# Patient Record
Sex: MALE
[De-identification: names, ages, dates, MRNs, and addresses within clinical notes are randomized per-mention and may not be internally consistent; named-entity substitution may affect disease eponyms.]

---

## 2020-07-16 ENCOUNTER — TELEPHONE (OUTPATIENT)
Dept: SCHEDULING | Age: 60
End: 2020-07-16

## 2020-07-20 ENCOUNTER — OFFICE VISIT (OUTPATIENT)
Dept: INTERNAL MEDICINE | Age: 60
End: 2020-07-20

## 2020-07-20 VITALS
DIASTOLIC BLOOD PRESSURE: 82 MMHG | OXYGEN SATURATION: 97 % | HEART RATE: 100 BPM | WEIGHT: 150 LBS | RESPIRATION RATE: 16 BRPM | SYSTOLIC BLOOD PRESSURE: 140 MMHG | TEMPERATURE: 98.7 F | HEIGHT: 66 IN | BODY MASS INDEX: 24.11 KG/M2

## 2020-07-20 DIAGNOSIS — Z79.4 TYPE 2 DIABETES MELLITUS WITH HYPERGLYCEMIA, WITH LONG-TERM CURRENT USE OF INSULIN (CMD): ICD-10-CM

## 2020-07-20 DIAGNOSIS — E11.65 TYPE 2 DIABETES MELLITUS WITH HYPERGLYCEMIA, WITH LONG-TERM CURRENT USE OF INSULIN (CMD): ICD-10-CM

## 2020-07-20 DIAGNOSIS — E78.5 HYPERLIPIDEMIA WITH TARGET LOW DENSITY LIPOPROTEIN (LDL) CHOLESTEROL LESS THAN 70 MG/DL: ICD-10-CM

## 2020-07-20 DIAGNOSIS — K21.9 GASTROESOPHAGEAL REFLUX DISEASE WITHOUT ESOPHAGITIS: ICD-10-CM

## 2020-07-20 DIAGNOSIS — R10.12 ABDOMINAL PAIN, CHRONIC, LEFT UPPER QUADRANT: Primary | ICD-10-CM

## 2020-07-20 DIAGNOSIS — I25.810 CORONARY ARTERY DISEASE INVOLVING AUTOLOGOUS VEIN CORONARY BYPASS GRAFT WITHOUT ANGINA PECTORIS: ICD-10-CM

## 2020-07-20 DIAGNOSIS — G89.29 ABDOMINAL PAIN, CHRONIC, LEFT UPPER QUADRANT: Primary | ICD-10-CM

## 2020-07-20 DIAGNOSIS — I10 ESSENTIAL HYPERTENSION: ICD-10-CM

## 2020-07-20 PROBLEM — I25.10 CORONARY ARTERY DISEASE INVOLVING NATIVE CORONARY ARTERY OF NATIVE HEART WITHOUT ANGINA PECTORIS: Status: ACTIVE | Noted: 2018-02-14

## 2020-07-20 PROBLEM — E78.00 HIGH BLOOD CHOLESTEROL: Status: RESOLVED | Noted: 2020-07-20 | Resolved: 2020-07-20

## 2020-07-20 PROBLEM — I25.10 CORONARY ARTERY DISEASE INVOLVING NATIVE CORONARY ARTERY OF NATIVE HEART WITHOUT ANGINA PECTORIS: Status: RESOLVED | Noted: 2018-02-14 | Resolved: 2020-07-20

## 2020-07-20 PROBLEM — E78.00 HIGH BLOOD CHOLESTEROL: Status: ACTIVE | Noted: 2020-07-20

## 2020-07-20 PROCEDURE — 99204 OFFICE O/P NEW MOD 45 MIN: CPT | Performed by: INTERNAL MEDICINE

## 2020-07-20 PROCEDURE — 3077F SYST BP >= 140 MM HG: CPT | Performed by: INTERNAL MEDICINE

## 2020-07-20 PROCEDURE — 3079F DIAST BP 80-89 MM HG: CPT | Performed by: INTERNAL MEDICINE

## 2020-07-20 RX ORDER — OMEPRAZOLE 20 MG/1
20 CAPSULE, DELAYED RELEASE ORAL
Qty: 30 CAPSULE | Refills: 0 | Status: SHIPPED | OUTPATIENT
Start: 2020-07-20

## 2020-07-20 RX ORDER — ATORVASTATIN CALCIUM 40 MG/1
TABLET, FILM COATED ORAL
COMMUNITY
Start: 2011-03-10

## 2020-07-20 RX ORDER — CLOPIDOGREL BISULFATE 75 MG/1
TABLET ORAL
COMMUNITY
Start: 2011-03-10

## 2020-07-20 RX ORDER — LANCETS
EACH MISCELLANEOUS
COMMUNITY
Start: 2016-09-08

## 2020-07-20 RX ORDER — EZETIMIBE 10 MG/1
TABLET ORAL
COMMUNITY
Start: 2011-03-21

## 2020-07-20 RX ORDER — INSULIN ASPART 100 [IU]/ML
15 INJECTION, SOLUTION INTRAVENOUS; SUBCUTANEOUS
COMMUNITY
Start: 2010-05-17

## 2020-07-20 RX ORDER — NITROGLYCERIN 0.4 MG/1
TABLET SUBLINGUAL
COMMUNITY
Start: 2010-01-28

## 2020-07-20 RX ORDER — MULTIVIT-MIN/IRON/FOLIC ACID/K 18-600-40
1000 CAPSULE ORAL DAILY
COMMUNITY

## 2020-07-20 RX ORDER — CARVEDILOL 12.5 MG/1
TABLET ORAL
COMMUNITY
Start: 2011-08-12

## 2020-07-20 RX ORDER — LOSARTAN POTASSIUM 100 MG/1
100 TABLET ORAL DAILY
COMMUNITY
Start: 2017-09-18

## 2020-07-20 RX ORDER — ASPIRIN 325 MG
TABLET ORAL
COMMUNITY
Start: 2011-01-07

## 2020-07-20 SDOH — HEALTH STABILITY: MENTAL HEALTH
STRESS IS WHEN SOMEONE FEELS TENSE, NERVOUS, ANXIOUS, OR CAN'T SLEEP AT NIGHT BECAUSE THEIR MIND IS TROUBLED. HOW STRESSED ARE YOU?: ONLY A LITTLE

## 2020-07-20 SDOH — HEALTH STABILITY: PHYSICAL HEALTH: ON AVERAGE, HOW MANY DAYS PER WEEK DO YOU ENGAGE IN MODERATE TO STRENUOUS EXERCISE (LIKE A BRISK WALK)?: 0 DAYS

## 2020-07-20 SDOH — HEALTH STABILITY: PHYSICAL HEALTH: ON AVERAGE, HOW MANY MINUTES DO YOU ENGAGE IN EXERCISE AT THIS LEVEL?: 0 MIN

## 2020-07-20 ASSESSMENT — PATIENT HEALTH QUESTIONNAIRE - PHQ9
CLINICAL INTERPRETATION OF PHQ9 SCORE: NO FURTHER SCREENING NEEDED
CLINICAL INTERPRETATION OF PHQ2 SCORE: NO FURTHER SCREENING NEEDED
2. FEELING DOWN, DEPRESSED OR HOPELESS: NOT AT ALL
1. LITTLE INTEREST OR PLEASURE IN DOING THINGS: NOT AT ALL
SUM OF ALL RESPONSES TO PHQ9 QUESTIONS 1 AND 2: 0
SUM OF ALL RESPONSES TO PHQ9 QUESTIONS 1 AND 2: 0

## 2020-07-22 ENCOUNTER — IMAGING SERVICES (OUTPATIENT)
Dept: ULTRASOUND IMAGING | Age: 60
End: 2020-07-22
Attending: INTERNAL MEDICINE

## 2020-07-22 DIAGNOSIS — G89.29 ABDOMINAL PAIN, CHRONIC, LEFT UPPER QUADRANT: ICD-10-CM

## 2020-07-22 DIAGNOSIS — R10.12 ABDOMINAL PAIN, CHRONIC, LEFT UPPER QUADRANT: ICD-10-CM

## 2020-07-22 PROCEDURE — 76705 ECHO EXAM OF ABDOMEN: CPT | Performed by: RADIOLOGY

## 2021-09-13 ENCOUNTER — TELEPHONE (OUTPATIENT)
Dept: FAMILY MEDICINE CLINIC | Facility: CLINIC | Age: 61
End: 2021-09-13

## 2021-09-13 ENCOUNTER — OFFICE VISIT (OUTPATIENT)
Dept: FAMILY MEDICINE CLINIC | Facility: CLINIC | Age: 61
End: 2021-09-13
Payer: COMMERCIAL

## 2021-09-13 VITALS
SYSTOLIC BLOOD PRESSURE: 122 MMHG | HEART RATE: 98 BPM | WEIGHT: 147 LBS | OXYGEN SATURATION: 99 % | BODY MASS INDEX: 24.2 KG/M2 | HEIGHT: 65.55 IN | DIASTOLIC BLOOD PRESSURE: 80 MMHG | TEMPERATURE: 98 F

## 2021-09-13 DIAGNOSIS — E11.65 DM TYPE 2, UNCONTROLLED, WITH RENAL COMPLICATIONS (HCC): ICD-10-CM

## 2021-09-13 DIAGNOSIS — I50.22 CHRONIC SYSTOLIC CONGESTIVE HEART FAILURE (HCC): ICD-10-CM

## 2021-09-13 DIAGNOSIS — E11.29 DM TYPE 2, UNCONTROLLED, WITH RENAL COMPLICATIONS (HCC): ICD-10-CM

## 2021-09-13 DIAGNOSIS — E78.5 HYPERLIPIDEMIA WITH TARGET LOW DENSITY LIPOPROTEIN (LDL) CHOLESTEROL LESS THAN 70 MG/DL: ICD-10-CM

## 2021-09-13 DIAGNOSIS — Z95.5 S/P CORONARY ARTERY STENT PLACEMENT: ICD-10-CM

## 2021-09-13 DIAGNOSIS — Z95.1 S/P CABG X 5: ICD-10-CM

## 2021-09-13 DIAGNOSIS — Z00.00 ROUTINE GENERAL MEDICAL EXAMINATION AT A HEALTH CARE FACILITY: Primary | ICD-10-CM

## 2021-09-13 DIAGNOSIS — I25.810 CORONARY ARTERY DISEASE INVOLVING AUTOLOGOUS VEIN CORONARY BYPASS GRAFT WITHOUT ANGINA PECTORIS: ICD-10-CM

## 2021-09-13 DIAGNOSIS — E11.319 DIABETIC RETINOPATHY OF BOTH EYES ASSOCIATED WITH TYPE 2 DIABETES MELLITUS, MACULAR EDEMA PRESENCE UNSPECIFIED, UNSPECIFIED RETINOPATHY SEVERITY (HCC): ICD-10-CM

## 2021-09-13 DIAGNOSIS — I12.9 HYPERTENSION, RENAL DISEASE: ICD-10-CM

## 2021-09-13 LAB
CARTRIDGE LOT#: 815 NUMERIC
HEMOGLOBIN A1C: 12.7 % (ref 4.3–5.6)

## 2021-09-13 PROCEDURE — 83036 HEMOGLOBIN GLYCOSYLATED A1C: CPT | Performed by: FAMILY MEDICINE

## 2021-09-13 PROCEDURE — 99203 OFFICE O/P NEW LOW 30 MIN: CPT | Performed by: FAMILY MEDICINE

## 2021-09-13 PROCEDURE — 99386 PREV VISIT NEW AGE 40-64: CPT | Performed by: FAMILY MEDICINE

## 2021-09-13 PROCEDURE — 3046F HEMOGLOBIN A1C LEVEL >9.0%: CPT | Performed by: FAMILY MEDICINE

## 2021-09-13 PROCEDURE — 3008F BODY MASS INDEX DOCD: CPT | Performed by: FAMILY MEDICINE

## 2021-09-13 PROCEDURE — 3074F SYST BP LT 130 MM HG: CPT | Performed by: FAMILY MEDICINE

## 2021-09-13 PROCEDURE — 3079F DIAST BP 80-89 MM HG: CPT | Performed by: FAMILY MEDICINE

## 2021-09-13 RX ORDER — INSULIN ASPART 100 [IU]/ML
INJECTION, SOLUTION INTRAVENOUS; SUBCUTANEOUS
Qty: 60 ML | Refills: 1 | Status: SHIPPED | OUTPATIENT
Start: 2021-09-13

## 2021-09-13 RX ORDER — CARVEDILOL 12.5 MG/1
12.5 TABLET ORAL DAILY
COMMUNITY
End: 2021-09-13

## 2021-09-13 RX ORDER — CLOPIDOGREL BISULFATE 75 MG/1
75 TABLET ORAL DAILY
Qty: 90 TABLET | Refills: 1 | Status: SHIPPED | OUTPATIENT
Start: 2021-09-13

## 2021-09-13 RX ORDER — LOSARTAN POTASSIUM 100 MG/1
100 TABLET ORAL DAILY
Qty: 90 TABLET | Refills: 1 | Status: SHIPPED | OUTPATIENT
Start: 2021-09-13

## 2021-09-13 RX ORDER — EZETIMIBE 10 MG/1
10 TABLET ORAL DAILY
Qty: 90 TABLET | Refills: 1 | Status: SHIPPED | OUTPATIENT
Start: 2021-09-13

## 2021-09-13 RX ORDER — CARVEDILOL 12.5 MG/1
12.5 TABLET ORAL DAILY
Qty: 180 TABLET | Refills: 1 | Status: SHIPPED | OUTPATIENT
Start: 2021-09-13

## 2021-09-13 RX ORDER — INSULIN DETEMIR 100 [IU]/ML
30 INJECTION, SOLUTION SUBCUTANEOUS NIGHTLY
Qty: 27 ML | Refills: 1 | Status: SHIPPED | OUTPATIENT
Start: 2021-09-13 | End: 2021-12-12

## 2021-09-13 RX ORDER — ATORVASTATIN CALCIUM 40 MG/1
40 TABLET, FILM COATED ORAL DAILY
Qty: 90 TABLET | Refills: 1 | Status: SHIPPED | OUTPATIENT
Start: 2021-09-13

## 2021-09-13 RX ORDER — INSULIN ASPART 100 [IU]/ML
INJECTION, SOLUTION INTRAVENOUS; SUBCUTANEOUS
COMMUNITY
End: 2021-09-13

## 2021-09-14 NOTE — PATIENT INSTRUCTIONS
Call to schedule with cardiologist, eye doctor, and diabetic clinic    Continue all meds as prescribed    Go to [facility] for fasting bloodwork and urine tests    Followup in 2 months, sooner if needed

## 2021-09-14 NOTE — TELEPHONE ENCOUNTER
Patient signed HIPAA medical records authorization form for the Facility identified below to disclose patient health information to [address] [place] [address] [address], [facility], [address]  Phone: [phone]  Fax# [phone]

## 2021-09-25 PROCEDURE — 3060F POS MICROALBUMINURIA REV: CPT | Performed by: FAMILY MEDICINE

## 2021-09-27 LAB
ABSOLUTE BASOPHILS: 28 CELLS/UL (ref 0–200)
ABSOLUTE EOSINOPHILS: 99 CELLS/UL (ref 15–500)
ABSOLUTE LYMPHOCYTES: 2415 CELLS/UL (ref 850–3900)
ABSOLUTE MONOCYTES: 616 CELLS/UL (ref 200–950)
ABSOLUTE NEUTROPHILS: 2343 CELLS/UL (ref 1500–7800)
ALBUMIN/GLOBULIN RATIO: 1.3 (CALC) (ref 1–2.5)
ALBUMIN: 3.5 G/DL (ref 3.6–5.1)
ALKALINE PHOSPHATASE: 96 U/L (ref 35–144)
ALT: 28 U/L (ref 9–46)
AST: 20 U/L (ref 10–35)
BASOPHILS: 0.5 %
BILIRUBIN, TOTAL: 0.6 MG/DL (ref 0.2–1.2)
BUN: 19 MG/DL (ref 7–25)
CALCIUM: 9.2 MG/DL (ref 8.6–10.3)
CARBON DIOXIDE: 28 MMOL/L (ref 20–32)
CHLORIDE: 105 MMOL/L (ref 98–110)
CHOL/HDLC RATIO: 2.5 (CALC)
CHOLESTEROL, TOTAL: 123 MG/DL
CREATININE, RANDOM URINE: 120 MG/DL (ref 20–320)
CREATININE: 1.27 MG/DL (ref 0.7–1.33)
EGFR IF AFRICN AM: 71 ML/MIN/1.73M2
EGFR IF NONAFRICN AM: 61 ML/MIN/1.73M2
EOSINOPHILS: 1.8 %
GLOBULIN: 2.6 G/DL (CALC) (ref 1.9–3.7)
GLUCOSE: 139 MG/DL (ref 65–99)
HDL CHOLESTEROL: 49 MG/DL
HEMATOCRIT: 40 % (ref 38.5–50)
HEMOGLOBIN: 13.4 G/DL (ref 13.2–17.1)
LDL-CHOLESTEROL: 59 MG/DL (CALC)
LYMPHOCYTES: 43.9 %
MCH: 27.8 PG (ref 27–33)
MCHC: 33.5 G/DL (ref 32–36)
MCV: 83 FL (ref 80–100)
MICROALBUMIN/CREATININE RATIO, RANDOM URINE: 1567 MCG/MG CREAT
MICROALBUMIN: 188 MG/DL
MONOCYTES: 11.2 %
MPV: 11.2 FL (ref 7.5–12.5)
NEUTROPHILS: 42.6 %
NON-HDL CHOLESTEROL: 74 MG/DL (CALC)
PLATELET COUNT: 265 THOUSAND/UL (ref 140–400)
POTASSIUM: 4.9 MMOL/L (ref 3.5–5.3)
PROTEIN, TOTAL: 6.1 G/DL (ref 6.1–8.1)
RDW: 13.8 % (ref 11–15)
RED BLOOD CELL COUNT: 4.82 MILLION/UL (ref 4.2–5.8)
SODIUM: 141 MMOL/L (ref 135–146)
T4, FREE: 1 NG/DL (ref 0.8–1.8)
TOTAL PSA: 1.4 NG/ML
TRIGLYCERIDES: 70 MG/DL
TSH: 2.17 MIU/L (ref 0.4–4.5)
WHITE BLOOD CELL COUNT: 5.5 THOUSAND/UL (ref 3.8–10.8)

## 2021-09-28 ENCOUNTER — TELEPHONE (OUTPATIENT)
Dept: FAMILY MEDICINE CLINIC | Facility: CLINIC | Age: 61
End: 2021-09-28

## 2021-09-28 NOTE — PROGRESS NOTES
Colten BaldwinThrockmorton is a 63year old male who is here for Patient presents with:  Wellness Visit  Medication Request      HPI:     1. Routine general medical examination at a health care facility  -due for wellness    2.  Coronary artery disease involving au Thyroid Disorder Daughter         Thyroid history - unable to elaborate   • Other (Other) Daughter         See thyroid tab   • Diabetes Brother         Type 2 DM on oral agents   • Eye Problems Neg        Social History    Socioeconomic History      Lindsay SYSTEMS:     See HPI for relevant ROS  GENERAL HEALTH: no other complaints  NEURO: no other complaints  VISION: no other complaints  RESPIRATORY: no other complaints  CARDIOVASCULAR: no other complaints  GI: no other complaints  : no other complaints  SK disease) In 2006 and 2010    Stents x 2 - once in 2006 after MI and prior to CABG and again in 2010.    • CAD (coronary artery disease), autologous vein bypass graft 5/13/2014   • DM type 2, uncontrolled, with renal complications ([facility] [address] 2/23/0509   • Hyperli reasons:     The patient has a prior MI or stroke diagnosis    ASSESSMENT AND PLAN:     Health Maintenance  -We discussed the following:  Healthy diet and exercise, immunizations, and cancer screening    -Fasting labs ordered    Stress Management: counseled & 12-14 units w/dinner + SSI (max 100 units/day)   • insulin detemir (LEVEMIR FLEXTOUCH) 100 UNIT/ML Subcutaneous Solution Pen-injector 27 mL 1     Sig: Inject 30 Units into the skin nightly.    • atorvastatin 40 MG Oral Tab 90 tablet 1     Sig: Take 1 tabl

## 2021-09-28 NOTE — TELEPHONE ENCOUNTER
Patient is due for Colonoscopy. Called patient- I was explaining to patient that we received records from the A and we did not receive a colonoscopy report. I went on to explain to patient that he is due for a Colonoscopy.  Patient very rudely interru

## 2021-09-30 ENCOUNTER — TELEPHONE (OUTPATIENT)
Dept: FAMILY MEDICINE CLINIC | Facility: CLINIC | Age: 61
End: 2021-09-30

## 2021-09-30 DIAGNOSIS — E11.65 DM TYPE 2, UNCONTROLLED, WITH RENAL COMPLICATIONS (HCC): Primary | ICD-10-CM

## 2021-09-30 DIAGNOSIS — E11.29 DM TYPE 2, UNCONTROLLED, WITH RENAL COMPLICATIONS (HCC): Primary | ICD-10-CM

## 2021-09-30 NOTE — TELEPHONE ENCOUNTER
I see a referral placed for diabetes education. Unsure if you want the clinic with [facility]? I pended this referral.  Please advise.

## 2021-09-30 NOTE — TELEPHONE ENCOUNTER
Elisabeth Daughter is calling regarding Flor Robertson, he is seeing specialist that Dr Varsha Roberts referred to him, he is a diabetic but there was no Endo Dr referred, and Flor Robertson would like to have a diabetic reader the one that is on his arm so he does not have to stick

## 2021-10-04 NOTE — PROGRESS NOTES
Marquise Pritchett is a 63year old male who presents today to establish for IDDM diabetes management. Primary care physician: Joy George MD  In the past 3m DM control has been poorly controlled.   Today's A1C 10.7% ( last A1C 12.7% )     Due to wife c 09/25/2021    TRIG 70 09/25/2021    HDL 49 09/25/2021    LDL 59 09/25/2021    MICROALBCREA 1,002.7 (H) 08/31/2016    CREATSERUM 1.27 09/25/2021    GFRNAA 61 09/25/2021    GFRAA 71 09/25/2021    AST 20 09/25/2021    ALT 28 09/25/2021       Component      La Diabetes Mother         Type 2 DM on oral agents   • Thyroid Disorder Mother         Thyroid history - not able to elaborate   • Other (Other) Mother         See thyroid tab   • Heart Disorder Maternal Grandmother         CAD   • Diabetes Paternal Grandmot polyphagia, polydipsia: no  Neurological: Paresthesias: yes, LE   HEENT: Blurred vision: no  Skin: no rash or wounds  Hematological: Hypoglycemia: yes       Review of Systems     LUNGS: denies shortness of breath   CARDIOVASCULAR: denies chest pain  GI: de look into coverage kelli FRANCIS       Reviewed w patient pathophysiology of diabetes, clinical significance of A1c, adverse effects of suboptimal glucose control, and goals of therapy   Reviewed the A1C test, what the value reflects and the goal for the eye exam: No data recorded Exam shows retinopathy?  No data recorded  Last diabetic foot exam: No data recorded- defer to next appt   Date of last PHQ-2 depression screen: PHQ-2 - Date of last depression screening: 10/5/2021  Dentist : recommended every 6m

## 2021-10-05 ENCOUNTER — OFFICE VISIT (OUTPATIENT)
Dept: ENDOCRINOLOGY CLINIC | Facility: CLINIC | Age: 61
End: 2021-10-05
Payer: COMMERCIAL

## 2021-10-05 ENCOUNTER — TELEPHONE (OUTPATIENT)
Dept: ENDOCRINOLOGY CLINIC | Facility: CLINIC | Age: 61
End: 2021-10-05

## 2021-10-05 VITALS
HEART RATE: 90 BPM | OXYGEN SATURATION: 98 % | WEIGHT: 150.81 LBS | SYSTOLIC BLOOD PRESSURE: 142 MMHG | BODY MASS INDEX: 25 KG/M2 | DIASTOLIC BLOOD PRESSURE: 86 MMHG

## 2021-10-05 DIAGNOSIS — E10.65 TYPE 1 DIABETES MELLITUS WITH HYPERGLYCEMIA (HCC): Primary | ICD-10-CM

## 2021-10-05 DIAGNOSIS — I10 PRIMARY HYPERTENSION: ICD-10-CM

## 2021-10-05 DIAGNOSIS — E78.5 HYPERLIPIDEMIA WITH TARGET LOW DENSITY LIPOPROTEIN (LDL) CHOLESTEROL LESS THAN 70 MG/DL: ICD-10-CM

## 2021-10-05 PROCEDURE — 3079F DIAST BP 80-89 MM HG: CPT | Performed by: NURSE PRACTITIONER

## 2021-10-05 PROCEDURE — 3046F HEMOGLOBIN A1C LEVEL >9.0%: CPT | Performed by: FAMILY MEDICINE

## 2021-10-05 PROCEDURE — 99215 OFFICE O/P EST HI 40 MIN: CPT | Performed by: NURSE PRACTITIONER

## 2021-10-05 PROCEDURE — 82947 ASSAY GLUCOSE BLOOD QUANT: CPT | Performed by: NURSE PRACTITIONER

## 2021-10-05 PROCEDURE — 3077F SYST BP >= 140 MM HG: CPT | Performed by: NURSE PRACTITIONER

## 2021-10-05 PROCEDURE — 83036 HEMOGLOBIN GLYCOSYLATED A1C: CPT | Performed by: NURSE PRACTITIONER

## 2021-10-05 RX ORDER — IBUPROFEN 600 MG/1
TABLET ORAL
Qty: 1 KIT | Refills: 0 | Status: SHIPPED | OUTPATIENT
Start: 2021-10-05 | End: 2021-10-05

## 2021-10-05 RX ORDER — IBUPROFEN 600 MG/1
TABLET ORAL
Qty: 1 KIT | Refills: 0 | Status: SHIPPED | OUTPATIENT
Start: 2021-10-05

## 2021-10-05 NOTE — PROGRESS NOTES
A continuous glucose sensor for 24 hour blood sugar monitoring was placed on patient today per APN order.    Serial NUMBER: [id number]  Exp date: (5/31/21)  - After cleansing skin with alcohol prep, Sensor (libre2)was inserted on  Right Posterior upper arm 96

## 2021-10-05 NOTE — ASSESSMENT & PLAN NOTE
Per ADA guidelines, in patients with type 2 diabetes and established ASCVD, incorporating agent proven to reduce major adverse CV events and CV mortality (which currently is  GLP agents: Victoza, Ozempic , Trulicity  and SGLT2, jardiance and [place])   How

## 2021-10-05 NOTE — ASSESSMENT & PLAN NOTE
A1C 10.7% (last A1C 12.7%)   Weight: 150 lb   Had lengthy discussion regarding poor glycemic control. Patient was reminded the negative impact these persistent high glucose trends have on his health.    Discussed importance of better glucose control to prev

## 2021-10-05 NOTE — TELEPHONE ENCOUNTER
Filled pt form.  Faxed pt form, progress notes and face sheet to. [phone]    CAB signed and faxed out

## 2021-10-05 NOTE — PATIENT INSTRUCTIONS
Mcmillan A1C: 10.7% (last A1C 12.7%)     Continuaremos trabajando juntos para mantener rosario niveles de azúcar en sakshi en un objetivo saludable. El [place] principal del tratamiento de la diabetes es evitar que mcmillan nivel de azúcar suba demasiado.  Medimos mcmillan so we can track your glucose trends  We can see if your insurance will cover this as well.  It may take a few weeks - we have to use [employer] ( a medical supplier)     The personal Cynthia continous glucose sensor will be ready after 60 minutes  Please scan A intersticial (sensor) y la sakshi capilar (punción digital) danny momentos de cambios rápidos en el azúcar en sakshi, macey después de comer, administrarse insulina o hacer ejercicio.  Puede oren prieto demora de aproximadamente 5 minutos para que el sensor alcohol. Actualmente, el Feliz 2 está siendo aprobado para el uso del sensor con prieto aplicación de teléfono inteligente, por lo que, por ahora, debe usar el dispositivo lector para rastrear rosario tendencias de azúcar en sakshi.  El día feliz 14 se integr alternate method to  measure your glucose levels and inform your treatment decisions. Remember to always have your next Sensor available before your current one ends so you can  keep getting your glucose readings.     If the sensor falls off before  th dosificación o problemas con las tendencias de azúcar en [address]. Prueba de azúcar en sakshi:  Recuerde llevar mcmillan medidor de glucosa o registro de azúcar en sakshi a cada leon en el centro de diabetes.   Es repita el tratamiento (paso 2). 4. Si falta más de prieto hora para mcmillan próxima comida, coma un bocadillo pequeño. 5. Si no está seguro de la causa de mcmillan nivel bajo de glucosa en sakshi, llame a mcmillan proveedor de [employer].   6. Siempre controle mcmillan glucos vomitar después de responder al glucagón. Aunque inicialmente el azúcar en sakshi puede subir a un nivel alto, es importante que el paciente ingiera algún alimento que contenga carbohidratos tan pronto macey pueda, ya que la glucosa inyectable desaparece. is for emergencies and should be used for such. What to expect after dosing: In about 5 to 15 minutes, the person should respond and begin yang ome out of hypoglycemia.  Sometimes a person may experience nausea or may vomit after responding to glucagon

## 2021-10-11 ENCOUNTER — NURSE ONLY (OUTPATIENT)
Dept: ENDOCRINOLOGY CLINIC | Facility: CLINIC | Age: 61
End: 2021-10-11
Payer: COMMERCIAL

## 2021-10-11 DIAGNOSIS — Z02.9 ADMINISTRATIVE ENCOUNTER: ICD-10-CM

## 2021-10-18 NOTE — TELEPHONE ENCOUNTER
Called pt LMTCB if had any question let pt know in message to give [phone] opt 8 to confirm his order.

## 2021-10-25 ENCOUNTER — TELEPHONE (OUTPATIENT)
Dept: FAMILY MEDICINE CLINIC | Facility: CLINIC | Age: 61
End: 2021-10-25

## 2021-10-25 NOTE — TELEPHONE ENCOUNTER
Pt's daughter called and said her dad is having the same symptoms he was having in July when he had pneumonia. She said she doesn't want to take him to the ER unless she really has to. She said he is having a hard time breathing.

## 2021-10-25 NOTE — TELEPHONE ENCOUNTER
Altagracia Hu is returning a call to the nurse regarding her dad, please call Altagracia Hu at [phone]

## 2021-10-25 NOTE — TELEPHONE ENCOUNTER
Spoke to Judd. States patient was admitted to the hospital in July with pneumonia. She thinks he is acting very similar. Has shortness of breath, bilat edema and a dry cough. Started 2 days ago. COVID test yesterday negative. O2 sat is 96%.   Has been

## 2021-10-25 NOTE — TELEPHONE ENCOUNTER
Dr. Elena Lima recommendation is for patient to go to ER for immediate evaluation      Spoke with Daughter Chico Guadalupe, she verbalized understanding of Dr. Rabia Lima instruction, she will take patient ER.

## 2021-11-05 NOTE — TELEPHONE ENCOUNTER
Contacted Chris Rodrigues with [facility] for status update. Pt was contacted 10/18 and instructed to call [facility].

## 2021-11-08 NOTE — TELEPHONE ENCOUNTER
Contacted daughter Kylee Dao to let her know they need to call Jhon Lucia at [phone] opt 8 to confirm order . Daughter agreed to do so today.

## 2021-11-26 ENCOUNTER — TELEPHONE (OUTPATIENT)
Dept: ENDOCRINOLOGY CLINIC | Facility: CLINIC | Age: 61
End: 2021-11-26

## 2021-11-26 NOTE — TELEPHONE ENCOUNTER
Pt daughter Zoya Alarcon called stating through [employer] they are paying 1,000 for 7 boxes of the [employer] sensors.  Zoya Alarcon states when she called the insurance to figure out how much it would be through there she was told to contacted the office for us to start a PA th

## 2021-11-26 NOTE — TELEPHONE ENCOUNTER
Was able to start Pa but pt daughter wanted to know if we were able to send Rx of feliz sensors to local pharmacy [facility] in [place] or if we could provided with samples for the meantime while pt gets approved hopefully?

## 2021-11-26 NOTE — TELEPHONE ENCOUNTER
Called becki to see how much it would coat and why. Pt has to pay 1,000 for 7 boxes due to pt not meeting their deductible.  Called pt insurance to start a PA for spoke with Priscilla Ty to get this PA was transferred to the PA department to start the authori

## 2021-11-29 RX ORDER — FLASH GLUCOSE SENSOR
KIT MISCELLANEOUS
Qty: 2 EACH | Refills: 5 | Status: SHIPPED | OUTPATIENT
Start: 2021-11-29

## 2021-11-29 RX ORDER — FLASH GLUCOSE SENSOR
1 KIT MISCELLANEOUS
Qty: 2 EACH | Refills: 5 | Status: SHIPPED | OUTPATIENT
Start: 2021-11-29 | End: 2021-11-29

## 2021-11-29 NOTE — TELEPHONE ENCOUNTER
Pt's daughter called on Friday 11/26/2021, left message requesting Rainey 2 sensors be sent to pharmacy.

## 2021-11-29 NOTE — TELEPHONE ENCOUNTER
Call with Judd who reports her dad uses the Freestyle 14 day [employer] sensors. Will send to pharmacy.

## 2021-11-30 NOTE — TELEPHONE ENCOUNTER
PA was not approved for Cynthia 14 day due to unmet criteria, let CAB know about the denial and CAB wrote a letter for a peer to peer review, sent CAB letter via fax    Fax: [phone]  Fax confirmed

## 2021-12-01 ENCOUNTER — TELEPHONE (OUTPATIENT)
Dept: ENDOCRINOLOGY CLINIC | Facility: CLINIC | Age: 61
End: 2021-12-01

## 2021-12-01 ENCOUNTER — NURSE ONLY (OUTPATIENT)
Dept: ENDOCRINOLOGY CLINIC | Facility: CLINIC | Age: 61
End: 2021-12-01
Payer: COMMERCIAL

## 2021-12-01 NOTE — TELEPHONE ENCOUNTER
Patient's daughter called in stating he uses the RENETTA [facility] 2 sensor. She told me 11/29/2021 that he was using Freestyle 14 day sensor.

## 2021-12-01 NOTE — TELEPHONE ENCOUNTER
Pt.'s wife answered phone;told her TATE Fernandez has referred her  for diabetes education. Provided her with return phone to schedule.

## 2021-12-01 NOTE — TELEPHONE ENCOUNTER
Pt daughter called stating she requested the wrong [place] she thought the pt was wearing the [place] 14 day but pt actually has the [place] 2, pt daughter already applied the [place] 14 day but the reader is not compatibly and pt is asking if we can give her a li

## 2021-12-15 ENCOUNTER — OFFICE VISIT (OUTPATIENT)
Dept: FAMILY MEDICINE CLINIC | Facility: CLINIC | Age: 61
End: 2021-12-15
Payer: COMMERCIAL

## 2021-12-15 VITALS
WEIGHT: 157 LBS | DIASTOLIC BLOOD PRESSURE: 70 MMHG | BODY MASS INDEX: 25.84 KG/M2 | HEART RATE: 92 BPM | OXYGEN SATURATION: 98 % | SYSTOLIC BLOOD PRESSURE: 132 MMHG | HEIGHT: 65.55 IN | TEMPERATURE: 97 F

## 2021-12-15 DIAGNOSIS — E11.29 DM TYPE 2, UNCONTROLLED, WITH RENAL COMPLICATIONS (HCC): ICD-10-CM

## 2021-12-15 DIAGNOSIS — Z23 NEED FOR VACCINATION: ICD-10-CM

## 2021-12-15 DIAGNOSIS — E11.319 DIABETIC RETINOPATHY OF BOTH EYES ASSOCIATED WITH TYPE 2 DIABETES MELLITUS, MACULAR EDEMA PRESENCE UNSPECIFIED, UNSPECIFIED RETINOPATHY SEVERITY (HCC): ICD-10-CM

## 2021-12-15 DIAGNOSIS — I50.22 CHRONIC SYSTOLIC CONGESTIVE HEART FAILURE (HCC): ICD-10-CM

## 2021-12-15 DIAGNOSIS — I25.810 CORONARY ARTERY DISEASE INVOLVING AUTOLOGOUS VEIN CORONARY BYPASS GRAFT WITHOUT ANGINA PECTORIS: Primary | ICD-10-CM

## 2021-12-15 DIAGNOSIS — Z95.1 S/P CABG X 5: ICD-10-CM

## 2021-12-15 DIAGNOSIS — E11.65 DM TYPE 2, UNCONTROLLED, WITH RENAL COMPLICATIONS (HCC): ICD-10-CM

## 2021-12-15 DIAGNOSIS — Z95.5 S/P CORONARY ARTERY STENT PLACEMENT: ICD-10-CM

## 2021-12-15 PROCEDURE — 3075F SYST BP GE 130 - 139MM HG: CPT | Performed by: FAMILY MEDICINE

## 2021-12-15 PROCEDURE — 90471 IMMUNIZATION ADMIN: CPT | Performed by: FAMILY MEDICINE

## 2021-12-15 PROCEDURE — 99214 OFFICE O/P EST MOD 30 MIN: CPT | Performed by: FAMILY MEDICINE

## 2021-12-15 PROCEDURE — 3078F DIAST BP <80 MM HG: CPT | Performed by: FAMILY MEDICINE

## 2021-12-15 PROCEDURE — 90686 IIV4 VACC NO PRSV 0.5 ML IM: CPT | Performed by: FAMILY MEDICINE

## 2021-12-15 PROCEDURE — 3008F BODY MASS INDEX DOCD: CPT | Performed by: FAMILY MEDICINE

## 2021-12-15 NOTE — PROGRESS NOTES
Rafaela Zuñiga is a 63year old male here for Patient presents with:  Diabetes: Sugars are ranging from   Hypertension: Follow up      HPI:       1. Coronary artery disease involving autologous vein coronary bypass graft without angina pectoris  2. Problem Relation Age of Onset   • Diabetes Mother         Type 2 DM on oral agents   • Thyroid Disorder Mother         Thyroid history - not able to elaborate   • Other (Other) Mother         See thyroid tab   • Heart Disorder Maternal Grandmother 90 tablet 1   • losartan 100 MG Oral Tab Take 1 tablet (100 mg total) by mouth daily. 90 tablet 1   • carvedilol 12.5 MG Oral Tab Take 1 tablet (12.5 mg total) by mouth daily.  180 tablet 1   • Cholecalciferol (VITAMIN D) 1000 UNITS Oral Tab Take 1 tablet b in this encounter       Imaging & Referrals:  FLULAVAL INFLUENZA VACCINE QUAD PRESERVATIVE FREE 0.5 Shawn Upton MD    I spent a total of 30 minutes, more than half of which was spent counseling/coordinating care regarding dm, cad

## 2021-12-15 NOTE — PATIENT INSTRUCTIONS
Continue all medication as prescribed    Followup with Diabetes clinic    Call to schedule appt with cardiologist and eye doctor    Followup in 3 months, sooner if needed

## 2021-12-28 NOTE — TELEPHONE ENCOUNTER
Contacted patient's daughter today after calling [facility] pharmacy solutions) for decision on appeal of denial on Rainey sensors.     They decided NOT to move forward with [employer] due to out of pocket cost.  They are purchasing directly from [facility]

## 2021-12-29 NOTE — TELEPHONE ENCOUNTER
Spent 40 minutes on hold yesterday before speaking with agent who showed no updates to this patient's chart. She assumed that meant, the appeal was never received.   Will refax to both the \"reconsideration\" and standard appeal.  Faxes confirmed to [phone]

## 2022-01-03 NOTE — TELEPHONE ENCOUNTER
Received notification from insurance ([facility]) that [facility] is not longer a member with [facility]. [facility] will not be able to process the appeal.  Thank you. \"    Will send to scan and send message to patient regarding insu

## 2022-03-01 RX ORDER — FLASH GLUCOSE SENSOR
KIT MISCELLANEOUS
Qty: 2 EACH | Refills: 5 | Status: SHIPPED | OUTPATIENT
Start: 2022-03-01 | End: 2022-03-29

## 2022-03-15 ENCOUNTER — OFFICE VISIT (OUTPATIENT)
Dept: FAMILY MEDICINE CLINIC | Facility: CLINIC | Age: 62
End: 2022-03-15
Payer: COMMERCIAL

## 2022-03-15 VITALS
BODY MASS INDEX: 24.85 KG/M2 | DIASTOLIC BLOOD PRESSURE: 70 MMHG | OXYGEN SATURATION: 98 % | WEIGHT: 151 LBS | HEART RATE: 92 BPM | SYSTOLIC BLOOD PRESSURE: 128 MMHG | HEIGHT: 65.5 IN | TEMPERATURE: 98 F

## 2022-03-15 DIAGNOSIS — IMO0002 DM TYPE 2, UNCONTROLLED, WITH RENAL COMPLICATIONS: Primary | ICD-10-CM

## 2022-03-15 DIAGNOSIS — R40.0 DAYTIME SOMNOLENCE: ICD-10-CM

## 2022-03-15 DIAGNOSIS — R06.83 SNORING: ICD-10-CM

## 2022-03-15 DIAGNOSIS — R06.81 APNEA: ICD-10-CM

## 2022-03-15 DIAGNOSIS — I12.9 HYPERTENSION, RENAL DISEASE: ICD-10-CM

## 2022-03-15 LAB
CARTRIDGE LOT#: 881 NUMERIC
HEMOGLOBIN A1C: 8.2 % (ref 4.3–5.6)

## 2022-03-15 PROCEDURE — 3052F HG A1C>EQUAL 8.0%<EQUAL 9.0%: CPT | Performed by: FAMILY MEDICINE

## 2022-03-15 PROCEDURE — 83036 HEMOGLOBIN GLYCOSYLATED A1C: CPT | Performed by: FAMILY MEDICINE

## 2022-03-15 PROCEDURE — 3074F SYST BP LT 130 MM HG: CPT | Performed by: FAMILY MEDICINE

## 2022-03-15 PROCEDURE — 99214 OFFICE O/P EST MOD 30 MIN: CPT | Performed by: FAMILY MEDICINE

## 2022-03-15 PROCEDURE — 3078F DIAST BP <80 MM HG: CPT | Performed by: FAMILY MEDICINE

## 2022-03-15 PROCEDURE — 3008F BODY MASS INDEX DOCD: CPT | Performed by: FAMILY MEDICINE

## 2022-03-15 NOTE — PROGRESS NOTES
Patient has appointment scheduled with the Ophthalmologist for diabetic eye exam next month. Patient had Podiatrist evaluation 2 weeks ago.   Podiatrist is Dr. Tom Bolton in [place]

## 2022-03-15 NOTE — PATIENT INSTRUCTIONS
Trata benadryl o unisom en la noche para ayudar mcmillan dormiendo    [place] para hacer prieto leon con sleep study    Si mcmillan machina para presion esta vieja, puede buscar por prieto neuva de [employer]    Sigue con especialista de ojos y cardiologo    Regresa en 3 meses para revisar todo

## 2022-03-30 RX ORDER — FLASH GLUCOSE SENSOR
KIT MISCELLANEOUS
Qty: 2 EACH | Refills: 2 | Status: SHIPPED | OUTPATIENT
Start: 2022-03-30 | End: 2022-04-25

## 2022-04-26 ENCOUNTER — TELEPHONE (OUTPATIENT)
Dept: ENDOCRINOLOGY CLINIC | Facility: CLINIC | Age: 62
End: 2022-04-26

## 2022-04-26 ENCOUNTER — OFFICE VISIT (OUTPATIENT)
Dept: ENDOCRINOLOGY CLINIC | Facility: CLINIC | Age: 62
End: 2022-04-26
Payer: COMMERCIAL

## 2022-04-26 VITALS
SYSTOLIC BLOOD PRESSURE: 166 MMHG | OXYGEN SATURATION: 100 % | WEIGHT: 158.81 LBS | DIASTOLIC BLOOD PRESSURE: 88 MMHG | RESPIRATION RATE: 16 BRPM | BODY MASS INDEX: 26 KG/M2

## 2022-04-26 DIAGNOSIS — Z79.4 TYPE 2 DIABETES MELLITUS WITH HYPERGLYCEMIA, WITH LONG-TERM CURRENT USE OF INSULIN (HCC): Primary | ICD-10-CM

## 2022-04-26 DIAGNOSIS — E78.5 HYPERLIPIDEMIA WITH TARGET LOW DENSITY LIPOPROTEIN (LDL) CHOLESTEROL LESS THAN 70 MG/DL: ICD-10-CM

## 2022-04-26 DIAGNOSIS — N28.9 NEPHROPATHY: ICD-10-CM

## 2022-04-26 DIAGNOSIS — E11.65 TYPE 2 DIABETES MELLITUS WITH HYPERGLYCEMIA, WITH LONG-TERM CURRENT USE OF INSULIN (HCC): Primary | ICD-10-CM

## 2022-04-26 DIAGNOSIS — I25.810 CORONARY ARTERY DISEASE INVOLVING AUTOLOGOUS VEIN CORONARY BYPASS GRAFT WITHOUT ANGINA PECTORIS: ICD-10-CM

## 2022-04-26 DIAGNOSIS — I10 PRIMARY HYPERTENSION: ICD-10-CM

## 2022-04-26 LAB
CARTRIDGE LOT#: 918 NUMERIC
HEMOGLOBIN A1C: 7.6 % (ref 4.3–5.6)

## 2022-04-26 PROCEDURE — 3077F SYST BP >= 140 MM HG: CPT | Performed by: NURSE PRACTITIONER

## 2022-04-26 PROCEDURE — 99215 OFFICE O/P EST HI 40 MIN: CPT | Performed by: NURSE PRACTITIONER

## 2022-04-26 PROCEDURE — 3051F HG A1C>EQUAL 7.0%<8.0%: CPT | Performed by: INTERNAL MEDICINE

## 2022-04-26 PROCEDURE — 3060F POS MICROALBUMINURIA REV: CPT | Performed by: INTERNAL MEDICINE

## 2022-04-26 PROCEDURE — 3079F DIAST BP 80-89 MM HG: CPT | Performed by: NURSE PRACTITIONER

## 2022-04-26 PROCEDURE — 95251 CONT GLUC MNTR ANALYSIS I&R: CPT | Performed by: NURSE PRACTITIONER

## 2022-04-26 PROCEDURE — 83036 HEMOGLOBIN GLYCOSYLATED A1C: CPT | Performed by: NURSE PRACTITIONER

## 2022-04-26 RX ORDER — FLASH GLUCOSE SENSOR
KIT MISCELLANEOUS
Qty: 2 EACH | Refills: 2 | Status: SHIPPED | OUTPATIENT
Start: 2022-04-26

## 2022-04-26 RX ORDER — FLASH GLUCOSE SENSOR
1 KIT MISCELLANEOUS
Qty: 1 EACH | Refills: 11 | Status: SHIPPED | OUTPATIENT
Start: 2022-04-26

## 2022-04-26 RX ORDER — AMLODIPINE BESYLATE 5 MG/1
5 TABLET ORAL DAILY
Qty: 90 TABLET | Refills: 1 | Status: SHIPPED | OUTPATIENT
Start: 2022-04-26

## 2022-04-26 RX ORDER — INSULIN LISPRO 100 [IU]/ML
INJECTION, SOLUTION INTRAVENOUS; SUBCUTANEOUS
Qty: 15 ML | Refills: 2 | Status: SHIPPED | OUTPATIENT
Start: 2022-04-26

## 2022-04-26 NOTE — TELEPHONE ENCOUNTER
Pt signed PT info sheet and faxing to [facility] with OV notes and face sheet [phone]     Sent to scan

## 2022-04-26 NOTE — TELEPHONE ENCOUNTER
Contacted insurance for PA on [facility] 2 sensors. We can use [employer]. com but prefer paper. Went to [address] for PA form. Completed and faxed to [phone] with today's chart note.

## 2022-04-26 NOTE — TELEPHONE ENCOUNTER
Pt states insurance told him that he needs prior auth for feliz coverage    [address]   Grp: [facility]   [phone]

## 2022-04-27 LAB
CREATININE, RANDOM URINE: 107 MG/DL (ref 20–320)
MICROALBUMIN/CREATININE RATIO, RANDOM URINE: 2624 MCG/MG CREAT
MICROALBUMIN: 280.8 MG/DL

## 2022-04-29 NOTE — TELEPHONE ENCOUNTER
Pt daughter called father did not understand what this was about thought it was a bill .  Daughters svetlana phone is [phone] Implemented All Fall Risk Interventions:  [place] to call system. Call bell, personal items and telephone within reach. Instruct patient to call for assistance. Room bathroom lighting operational. Non-slip footwear when patient is off stretcher. Physically safe environment: no spills, clutter or unnecessary equipment. Stretcher in lowest position, wheels locked, appropriate side rails in place. Provide visual cue, wrist band, yellow gown, etc. Monitor gait and stability. Monitor for mental status changes and reorient to person, place, and time. Review medications for side effects contributing to fall risk. Reinforce activity limits and safety measures with patient and family.

## 2022-05-05 ENCOUNTER — TELEPHONE (OUTPATIENT)
Dept: ENDOCRINOLOGY CLINIC | Facility: CLINIC | Age: 62
End: 2022-05-05

## 2022-05-05 NOTE — TELEPHONE ENCOUNTER
Sent order, chart notes and facesheet for Rainey 2 order to [facility].  Was orginally sent to pharmacy but needs to go to DME being Medicare Advantage Plan

## 2022-05-05 NOTE — TELEPHONE ENCOUNTER
YOUNG Roger 11 regarding denial [phone]. Patient has T1 diabetes, CVD, CKD, Retinopathy, Neuropathy and is on MDI. The response we received on 4/27/22 states the request was denied. Called to request reason for denial and was told we are not in network. Transferred 4 times and on hold for 45 minutes. Called back to request provider services for additional help with this issue. We did determine the denial was incorrect and [facility] as well as the providers are in network and they denial was incorrect. Waiting on hold for the  on this claim to help. [employer] needs to use [employer] for pharmacy benefits. [facility] is not in network for this plan. This is a Medicare [address] - all Medicare Advantage CGM need to go to DME and [facility] or [employer] are in network. Sending to [facility].  Called patient to let him know to expect the call from [facility]

## 2022-05-06 ENCOUNTER — PATIENT MESSAGE (OUTPATIENT)
Dept: ENDOCRINOLOGY CLINIC | Facility: CLINIC | Age: 62
End: 2022-05-06

## 2022-05-06 NOTE — TELEPHONE ENCOUNTER
From: Susana Salomon  To: RUSS Otero  Sent: 5/6/2022 8:38 AM CDT  Subject: Medications     Did the doctor state i could no longer take losartan due to the protein found in my urine. I apologize I forgot what the doctor recommended regarding medications.  I can havey daughter call as well

## 2022-05-06 NOTE — TELEPHONE ENCOUNTER
Left message for pt to contact office regarding mychart message and labs. Will respond to [employer] also.

## 2022-05-06 NOTE — TELEPHONE ENCOUNTER
LOV: 4/26/2022  Future Appointments   Date Time Provider Pk [place]   5/24/2022  9:30 AM Fe Rubio RN, CDE [facility] [address] [address] [facility]   6/15/2022  8:00 AM Romain Singleton MD [facility] [address]   8/9/2022 10:15 AM RUSS Cordero [facility] [address] [address]     A1C: 7.6%

## 2022-05-11 ENCOUNTER — PATIENT MESSAGE (OUTPATIENT)
Dept: ENDOCRINOLOGY CLINIC | Facility: CLINIC | Age: 62
End: 2022-05-11

## 2022-05-11 RX ORDER — FLASH GLUCOSE SENSOR
1 KIT MISCELLANEOUS
Qty: 2 EACH | Refills: 11 | Status: SHIPPED | OUTPATIENT
Start: 2022-05-11

## 2022-05-11 NOTE — TELEPHONE ENCOUNTER
From: Zack Solis  To: RUSS Oscar  Sent: 5/11/2022 12:19 PM CDT  Subject: Sensor    My sensor randomly fell off. I submitted a request for a refill.

## 2022-05-31 ENCOUNTER — OFFICE VISIT (OUTPATIENT)
Dept: NEPHROLOGY | Facility: CLINIC | Age: 62
End: 2022-05-31
Payer: COMMERCIAL

## 2022-05-31 VITALS — SYSTOLIC BLOOD PRESSURE: 128 MMHG | DIASTOLIC BLOOD PRESSURE: 60 MMHG | BODY MASS INDEX: 25 KG/M2 | WEIGHT: 152 LBS

## 2022-05-31 DIAGNOSIS — I10 PRIMARY HYPERTENSION: ICD-10-CM

## 2022-05-31 DIAGNOSIS — E11.21 DIABETIC NEPHROPATHY ASSOCIATED WITH TYPE 2 DIABETES MELLITUS (HCC): ICD-10-CM

## 2022-05-31 DIAGNOSIS — N18.30 STAGE 3 CHRONIC KIDNEY DISEASE, UNSPECIFIED WHETHER STAGE 3A OR 3B CKD (HCC): Primary | ICD-10-CM

## 2022-05-31 DIAGNOSIS — R80.9 PROTEINURIA, UNSPECIFIED TYPE: ICD-10-CM

## 2022-05-31 PROCEDURE — 3074F SYST BP LT 130 MM HG: CPT | Performed by: INTERNAL MEDICINE

## 2022-05-31 PROCEDURE — 99204 OFFICE O/P NEW MOD 45 MIN: CPT | Performed by: INTERNAL MEDICINE

## 2022-05-31 PROCEDURE — 3078F DIAST BP <80 MM HG: CPT | Performed by: INTERNAL MEDICINE

## 2022-05-31 NOTE — TELEPHONE ENCOUNTER
[facility] said they did not receive this order. Can you please resend directly to [email or web address] to get processed ASAP

## 2022-05-31 NOTE — TELEPHONE ENCOUNTER
Can't find copy of order in brown folder or media. Orders prepped, signed and emailed to [email or web address]

## 2022-06-07 RX ORDER — ATORVASTATIN CALCIUM 40 MG/1
TABLET, FILM COATED ORAL
Qty: 90 TABLET | Refills: 0 | Status: SHIPPED | OUTPATIENT
Start: 2022-06-07

## 2022-06-07 RX ORDER — EZETIMIBE 10 MG/1
TABLET ORAL
Qty: 90 TABLET | Refills: 0 | Status: SHIPPED | OUTPATIENT
Start: 2022-06-07

## 2022-06-07 RX ORDER — CLOPIDOGREL BISULFATE 75 MG/1
TABLET ORAL
Qty: 90 TABLET | Refills: 0 | Status: SHIPPED | OUTPATIENT
Start: 2022-06-07

## 2022-06-07 RX ORDER — LOSARTAN POTASSIUM 100 MG/1
TABLET ORAL
Qty: 90 TABLET | Refills: 0 | Status: SHIPPED | OUTPATIENT
Start: 2022-06-07

## 2022-06-29 ENCOUNTER — APPOINTMENT (OUTPATIENT)
Dept: GENERAL RADIOLOGY | Age: 62
End: 2022-06-29
Attending: PHYSICIAN ASSISTANT
Payer: COMMERCIAL

## 2022-06-29 ENCOUNTER — TELEPHONE (OUTPATIENT)
Dept: CASE MANAGEMENT | Age: 62
End: 2022-06-29

## 2022-06-29 ENCOUNTER — HOSPITAL ENCOUNTER (OUTPATIENT)
Age: 62
Discharge: HOME | End: 2022-06-29
Payer: COMMERCIAL

## 2022-06-29 VITALS
HEIGHT: 65 IN | BODY MASS INDEX: 26.16 KG/M2 | TEMPERATURE: 98 F | SYSTOLIC BLOOD PRESSURE: 131 MMHG | WEIGHT: 157 LBS | RESPIRATION RATE: 14 BRPM | DIASTOLIC BLOOD PRESSURE: 77 MMHG | HEART RATE: 81 BPM | OXYGEN SATURATION: 99 %

## 2022-06-29 DIAGNOSIS — U07.1 COVID-19: Primary | ICD-10-CM

## 2022-06-29 DIAGNOSIS — N18.9 ACUTE RENAL FAILURE SUPERIMPOSED ON CHRONIC KIDNEY DISEASE, UNSPECIFIED CKD STAGE, UNSPECIFIED ACUTE RENAL FAILURE TYPE (HCC): ICD-10-CM

## 2022-06-29 DIAGNOSIS — N17.9 ACUTE RENAL FAILURE SUPERIMPOSED ON CHRONIC KIDNEY DISEASE, UNSPECIFIED CKD STAGE, UNSPECIFIED ACUTE RENAL FAILURE TYPE (HCC): ICD-10-CM

## 2022-06-29 LAB
ATRIAL RATE: 82 BPM
BUN BLD-MCNC: 37 MG/DL (ref 7–18)
CHLORIDE BLD-SCNC: 106 MMOL/L (ref 98–112)
CO2 BLD-SCNC: 19 MMOL/L (ref 21–32)
CREAT BLD-MCNC: 1.6 MG/DL
GLUCOSE BLD-MCNC: 170 MG/DL (ref 70–99)
HCT VFR BLD CALC: 42 %
ISTAT IONIZED CALCIUM FOR CHEM 8: 0.91 MMOL/L (ref 1.12–1.32)
P AXIS: 53 DEGREES
P-R INTERVAL: 140 MS
POTASSIUM BLD-SCNC: 4.1 MMOL/L (ref 3.6–5.1)
Q-T INTERVAL: 376 MS
QRS DURATION: 94 MS
QTC CALCULATION (BEZET): 439 MS
R AXIS: 88 DEGREES
SARS-COV-2 RNA RESP QL NAA+PROBE: DETECTED
SODIUM BLD-SCNC: 135 MMOL/L (ref 136–145)
T AXIS: 221 DEGREES
TROPONIN I BLD-MCNC: 0.02 NG/ML
TROPONIN I BLD-MCNC: 0.02 NG/ML
VENTRICULAR RATE: 82 BPM

## 2022-06-29 PROCEDURE — 99215 OFFICE O/P EST HI 40 MIN: CPT

## 2022-06-29 PROCEDURE — 80047 BASIC METABLC PNL IONIZED CA: CPT

## 2022-06-29 PROCEDURE — 71046 X-RAY EXAM CHEST 2 VIEWS: CPT | Performed by: PHYSICIAN ASSISTANT

## 2022-06-29 PROCEDURE — 99204 OFFICE O/P NEW MOD 45 MIN: CPT

## 2022-06-29 PROCEDURE — 93005 ELECTROCARDIOGRAM TRACING: CPT

## 2022-06-29 PROCEDURE — 84484 ASSAY OF TROPONIN QUANT: CPT

## 2022-06-29 PROCEDURE — 93010 ELECTROCARDIOGRAM REPORT: CPT

## 2022-06-29 RX ORDER — BEBTELOVIMAB 87.5 MG/ML
175 INJECTION, SOLUTION INTRAVENOUS ONCE
Status: COMPLETED | OUTPATIENT
Start: 2022-06-29 | End: 2022-06-29

## 2022-06-29 NOTE — ED QUICK NOTES
Pt felt nauseated/dizzy after phlebotomy right forearm. ice packs to forehead and back of neck, vitals recheck.  Pt reclined to low fowlers

## 2022-06-29 NOTE — ED QUICK NOTES
Pt rechecked he is feeling better. Denies any nausea or dizziness. Warm blanket provided, bottled water and snacks also provided.

## 2022-06-29 NOTE — TELEPHONE ENCOUNTER
Pt received MAB infusion at [address] [place] [address] on 6/29/22 for COVID-19. Please follow-up with pt for post-infusion assessment and home monitoring if needed. Thank you.

## 2022-06-29 NOTE — ED INITIAL ASSESSMENT (HPI)
Cough - x 2 days. Wife had pneumonia 3-4 weeks ago. Sore throat- 2 days  Left armpit pain - described as burning x 5 days.  Denies any rash

## 2022-10-18 RX ORDER — DAPAGLIFLOZIN 10 MG/1
TABLET, FILM COATED ORAL
Qty: 30 TABLET | Refills: 1 | Status: SHIPPED | OUTPATIENT
Start: 2022-10-18 | End: 2022-10-26

## 2022-10-26 DIAGNOSIS — Z79.4 TYPE 2 DIABETES MELLITUS WITH HYPERGLYCEMIA, WITH LONG-TERM CURRENT USE OF INSULIN (HCC): ICD-10-CM

## 2022-10-26 DIAGNOSIS — E11.65 TYPE 2 DIABETES MELLITUS WITH HYPERGLYCEMIA, WITH LONG-TERM CURRENT USE OF INSULIN (HCC): ICD-10-CM

## 2022-10-27 RX ORDER — CLOPIDOGREL BISULFATE 75 MG/1
75 TABLET ORAL DAILY
Qty: 90 TABLET | Refills: 0 | OUTPATIENT
Start: 2022-10-27

## 2022-10-27 RX ORDER — LOSARTAN POTASSIUM 100 MG/1
100 TABLET ORAL DAILY
Qty: 90 TABLET | Refills: 0 | OUTPATIENT
Start: 2022-10-27

## 2022-10-27 RX ORDER — EZETIMIBE 10 MG/1
10 TABLET ORAL DAILY
Qty: 90 TABLET | Refills: 0 | OUTPATIENT
Start: 2022-10-27

## 2022-10-27 RX ORDER — ATORVASTATIN CALCIUM 40 MG/1
40 TABLET, FILM COATED ORAL DAILY
Qty: 90 TABLET | Refills: 0 | OUTPATIENT
Start: 2022-10-27

## 2022-10-27 RX ORDER — CARVEDILOL 12.5 MG/1
12.5 TABLET ORAL DAILY
Qty: 180 TABLET | Refills: 1 | OUTPATIENT
Start: 2022-10-27

## 2022-10-27 RX ORDER — FLASH GLUCOSE SENSOR
1 KIT MISCELLANEOUS
Qty: 2 EACH | Refills: 11 | OUTPATIENT
Start: 2022-10-27

## 2022-10-28 DIAGNOSIS — Z79.4 TYPE 2 DIABETES MELLITUS WITH HYPERGLYCEMIA, WITH LONG-TERM CURRENT USE OF INSULIN (HCC): ICD-10-CM

## 2022-10-28 DIAGNOSIS — E11.65 TYPE 2 DIABETES MELLITUS WITH HYPERGLYCEMIA, WITH LONG-TERM CURRENT USE OF INSULIN (HCC): ICD-10-CM

## 2022-10-28 RX ORDER — LOSARTAN POTASSIUM 100 MG/1
TABLET ORAL
Qty: 90 TABLET | Refills: 0 | OUTPATIENT
Start: 2022-10-28

## 2022-10-28 RX ORDER — EZETIMIBE 10 MG/1
TABLET ORAL
Qty: 90 TABLET | Refills: 0 | OUTPATIENT
Start: 2022-10-28

## 2022-10-28 RX ORDER — CLOPIDOGREL BISULFATE 75 MG/1
TABLET ORAL
Qty: 90 TABLET | Refills: 0 | OUTPATIENT
Start: 2022-10-28

## 2022-10-28 RX ORDER — CARVEDILOL 12.5 MG/1
TABLET ORAL
Qty: 90 TABLET | Refills: 0 | OUTPATIENT
Start: 2022-10-28

## 2022-10-28 RX ORDER — INSULIN GLARGINE 100 [IU]/ML
INJECTION, SOLUTION SUBCUTANEOUS
Qty: 36 ML | Refills: 0 | OUTPATIENT
Start: 2022-10-28

## 2022-10-28 RX ORDER — ATORVASTATIN CALCIUM 40 MG/1
TABLET, FILM COATED ORAL
Qty: 90 TABLET | Refills: 0 | OUTPATIENT
Start: 2022-10-28

## 2022-10-30 ENCOUNTER — PATIENT MESSAGE (OUTPATIENT)
Dept: FAMILY MEDICINE CLINIC | Facility: CLINIC | Age: 62
End: 2022-10-30

## 2022-10-30 DIAGNOSIS — Z79.4 TYPE 2 DIABETES MELLITUS WITH HYPERGLYCEMIA, WITH LONG-TERM CURRENT USE OF INSULIN (HCC): ICD-10-CM

## 2022-10-30 DIAGNOSIS — E11.65 TYPE 2 DIABETES MELLITUS WITH HYPERGLYCEMIA, WITH LONG-TERM CURRENT USE OF INSULIN (HCC): ICD-10-CM

## 2022-10-31 RX ORDER — CARVEDILOL 12.5 MG/1
12.5 TABLET ORAL DAILY
Qty: 180 TABLET | Refills: 1 | Status: SHIPPED | OUTPATIENT
Start: 2022-10-31

## 2022-10-31 RX ORDER — EZETIMIBE 10 MG/1
10 TABLET ORAL DAILY
Qty: 90 TABLET | Refills: 0 | Status: SHIPPED | OUTPATIENT
Start: 2022-10-31

## 2022-10-31 RX ORDER — LOSARTAN POTASSIUM 100 MG/1
100 TABLET ORAL DAILY
Qty: 90 TABLET | Refills: 0 | Status: SHIPPED | OUTPATIENT
Start: 2022-10-31

## 2022-10-31 RX ORDER — ATORVASTATIN CALCIUM 40 MG/1
40 TABLET, FILM COATED ORAL DAILY
Qty: 90 TABLET | Refills: 0 | Status: SHIPPED | OUTPATIENT
Start: 2022-10-31

## 2022-10-31 RX ORDER — CLOPIDOGREL BISULFATE 75 MG/1
75 TABLET ORAL DAILY
Qty: 90 TABLET | Refills: 0 | Status: SHIPPED | OUTPATIENT
Start: 2022-10-31

## 2022-10-31 RX ORDER — INSULIN LISPRO 100 [IU]/ML
INJECTION, SOLUTION INTRAVENOUS; SUBCUTANEOUS
Qty: 15 ML | Refills: 2 | Status: SHIPPED | OUTPATIENT
Start: 2022-10-31

## 2022-10-31 NOTE — TELEPHONE ENCOUNTER
From: Tk Weiss  To: Carlton Cardoso MD  Sent: 10/30/2022 2:57 PM CDT  Subject: Medications    My daughter spoke with someone at the office and advised her in order to get a refill I would need to schedule an appointment with the doctor. I need a refill for my sensor to track my sugar. As well as a refill of my medication. Can you please refill my medications.  I have set up an appointment

## 2022-11-10 RX ORDER — INSULIN ASPART 100 [IU]/ML
INJECTION, SOLUTION INTRAVENOUS; SUBCUTANEOUS
Qty: 15 ML | Refills: 0 | Status: SHIPPED | OUTPATIENT
Start: 2022-11-10

## 2022-11-11 NOTE — TELEPHONE ENCOUNTER
Spoke with pt wife and she states that pt needs to see his PCP before seeing CAB. She stated they will schedule a follow up after the new year.

## 2022-11-15 ENCOUNTER — OFFICE VISIT (OUTPATIENT)
Dept: FAMILY MEDICINE CLINIC | Facility: CLINIC | Age: 62
End: 2022-11-15
Payer: COMMERCIAL

## 2022-11-15 VITALS
HEIGHT: 65 IN | SYSTOLIC BLOOD PRESSURE: 158 MMHG | TEMPERATURE: 97 F | HEART RATE: 102 BPM | WEIGHT: 162 LBS | BODY MASS INDEX: 26.99 KG/M2 | DIASTOLIC BLOOD PRESSURE: 90 MMHG

## 2022-11-15 DIAGNOSIS — E11.319 DIABETIC RETINOPATHY OF BOTH EYES ASSOCIATED WITH TYPE 2 DIABETES MELLITUS, MACULAR EDEMA PRESENCE UNSPECIFIED, UNSPECIFIED RETINOPATHY SEVERITY (HCC): ICD-10-CM

## 2022-11-15 DIAGNOSIS — Z95.5 S/P CORONARY ARTERY STENT PLACEMENT: ICD-10-CM

## 2022-11-15 DIAGNOSIS — Z95.1 S/P CABG X 5: ICD-10-CM

## 2022-11-15 DIAGNOSIS — Z00.00 ROUTINE GENERAL MEDICAL EXAMINATION AT A HEALTH CARE FACILITY: Primary | ICD-10-CM

## 2022-11-15 DIAGNOSIS — I50.22 CHRONIC SYSTOLIC CONGESTIVE HEART FAILURE (HCC): ICD-10-CM

## 2022-11-15 DIAGNOSIS — Z12.5 ENCOUNTER FOR SCREENING FOR MALIGNANT NEOPLASM OF PROSTATE: ICD-10-CM

## 2022-11-15 DIAGNOSIS — Z79.4 TYPE 2 DIABETES MELLITUS WITH HYPERGLYCEMIA, WITH LONG-TERM CURRENT USE OF INSULIN (HCC): ICD-10-CM

## 2022-11-15 DIAGNOSIS — Z12.11 ENCOUNTER FOR SCREENING FOR MALIGNANT NEOPLASM OF COLON: ICD-10-CM

## 2022-11-15 DIAGNOSIS — I25.810 CORONARY ARTERY DISEASE INVOLVING AUTOLOGOUS VEIN CORONARY BYPASS GRAFT WITHOUT ANGINA PECTORIS: ICD-10-CM

## 2022-11-15 DIAGNOSIS — E78.5 HYPERLIPIDEMIA WITH TARGET LOW DENSITY LIPOPROTEIN (LDL) CHOLESTEROL LESS THAN 70 MG/DL: ICD-10-CM

## 2022-11-15 DIAGNOSIS — I10 PRIMARY HYPERTENSION: ICD-10-CM

## 2022-11-15 DIAGNOSIS — I12.9 HYPERTENSION, RENAL DISEASE: ICD-10-CM

## 2022-11-15 DIAGNOSIS — E11.65 TYPE 2 DIABETES MELLITUS WITH HYPERGLYCEMIA, WITH LONG-TERM CURRENT USE OF INSULIN (HCC): ICD-10-CM

## 2022-11-15 LAB
CARTRIDGE LOT#: 507 NUMERIC
HEMOGLOBIN A1C: 9.5 % (ref 4.3–5.6)

## 2022-11-15 PROCEDURE — 3008F BODY MASS INDEX DOCD: CPT | Performed by: FAMILY MEDICINE

## 2022-11-15 PROCEDURE — 3046F HEMOGLOBIN A1C LEVEL >9.0%: CPT | Performed by: FAMILY MEDICINE

## 2022-11-15 PROCEDURE — 3080F DIAST BP >= 90 MM HG: CPT | Performed by: FAMILY MEDICINE

## 2022-11-15 PROCEDURE — 3077F SYST BP >= 140 MM HG: CPT | Performed by: FAMILY MEDICINE

## 2022-11-15 PROCEDURE — 83036 HEMOGLOBIN GLYCOSYLATED A1C: CPT | Performed by: FAMILY MEDICINE

## 2022-11-15 PROCEDURE — 99214 OFFICE O/P EST MOD 30 MIN: CPT | Performed by: FAMILY MEDICINE

## 2022-11-15 PROCEDURE — 99396 PREV VISIT EST AGE 40-64: CPT | Performed by: FAMILY MEDICINE

## 2022-11-15 RX ORDER — FLASH GLUCOSE SENSOR
1 KIT MISCELLANEOUS
Qty: 2 EACH | Refills: 11 | Status: SHIPPED | OUTPATIENT
Start: 2022-11-15

## 2022-11-15 RX ORDER — FLASH GLUCOSE SENSOR
KIT MISCELLANEOUS
Qty: 2 EACH | Refills: 2 | Status: CANCELLED | OUTPATIENT
Start: 2022-11-15

## 2022-11-15 RX ORDER — PEN NEEDLE, DIABETIC 32GX 5/32"
1 NEEDLE, DISPOSABLE MISCELLANEOUS
Qty: 200 EACH | Refills: 1 | Status: SHIPPED | OUTPATIENT
Start: 2022-11-15 | End: 2023-11-15

## 2022-11-15 NOTE — PATIENT INSTRUCTIONS
Increase lantus to 28 units in AM and 24 units in PM    Continue to track sugars    Call to schedule with [address] all other medication    Complete stool cards at home    Go to [employer] for fasting bloodwork in 5 weeks    See me in 6 weeks

## 2022-11-17 ENCOUNTER — TELEPHONE (OUTPATIENT)
Dept: FAMILY MEDICINE CLINIC | Facility: CLINIC | Age: 62
End: 2022-11-17

## 2022-11-17 RX ORDER — INSULIN GLARGINE 100 [IU]/ML
INJECTION, SOLUTION SUBCUTANEOUS
Qty: 48 ML | Refills: 0 | Status: SHIPPED | OUTPATIENT
Start: 2022-11-17

## 2022-12-13 NOTE — TELEPHONE ENCOUNTER
Pt does not have a follow up in Diabetes  Per PCP note 11- he is supposed to follow up here   Last A1c value was 9.5% done 11/15/2022.     Will not approve any refills for DM

## 2023-01-30 ENCOUNTER — PATIENT OUTREACH (OUTPATIENT)
Dept: FAMILY MEDICINE CLINIC | Facility: CLINIC | Age: 63
End: 2023-01-30

## 2023-01-30 NOTE — PROGRESS NOTES
Patient was asked to return in 6 weeks for follow up and to go to [facility] before appt.  LVm reminding patient to schedule appt and to get labs done before appt-

## 2023-02-13 PROBLEM — R10.12 ABDOMINAL PAIN, CHRONIC, LEFT UPPER QUADRANT: Status: ACTIVE | Noted: 2020-07-20

## 2023-02-13 PROBLEM — G89.29 ABDOMINAL PAIN, CHRONIC, LEFT UPPER QUADRANT: Status: ACTIVE | Noted: 2020-07-20

## 2023-02-13 PROCEDURE — 3046F HEMOGLOBIN A1C LEVEL >9.0%: CPT | Performed by: FAMILY MEDICINE

## 2023-02-13 PROCEDURE — 3060F POS MICROALBUMINURIA REV: CPT | Performed by: FAMILY MEDICINE

## 2023-03-09 ENCOUNTER — PATIENT OUTREACH (OUTPATIENT)
Dept: FAMILY MEDICINE CLINIC | Facility: CLINIC | Age: 63
End: 2023-03-09

## 2023-04-23 ENCOUNTER — APPOINTMENT (OUTPATIENT)
Dept: GENERAL RADIOLOGY | Age: 63
End: 2023-04-23
Attending: PHYSICIAN ASSISTANT
Payer: COMMERCIAL

## 2023-04-23 ENCOUNTER — HOSPITAL ENCOUNTER (OUTPATIENT)
Age: 63
Discharge: HOME | End: 2023-04-23
Payer: COMMERCIAL

## 2023-04-23 VITALS
HEART RATE: 96 BPM | SYSTOLIC BLOOD PRESSURE: 172 MMHG | BODY MASS INDEX: 28.85 KG/M2 | TEMPERATURE: 100 F | OXYGEN SATURATION: 97 % | DIASTOLIC BLOOD PRESSURE: 89 MMHG | WEIGHT: 169 LBS | RESPIRATION RATE: 20 BRPM | HEIGHT: 64 IN

## 2023-04-23 DIAGNOSIS — J18.9 PNEUMONIA OF LEFT LOWER LOBE DUE TO INFECTIOUS ORGANISM: Primary | ICD-10-CM

## 2023-04-23 LAB
POCT INFLUENZA A: NEGATIVE
POCT INFLUENZA B: NEGATIVE
SARS-COV-2 RNA RESP QL NAA+PROBE: NOT DETECTED

## 2023-04-23 PROCEDURE — 99213 OFFICE O/P EST LOW 20 MIN: CPT

## 2023-04-23 PROCEDURE — 87502 INFLUENZA DNA AMP PROBE: CPT | Performed by: PHYSICIAN ASSISTANT

## 2023-04-23 PROCEDURE — 71046 X-RAY EXAM CHEST 2 VIEWS: CPT | Performed by: PHYSICIAN ASSISTANT

## 2023-04-23 PROCEDURE — 99214 OFFICE O/P EST MOD 30 MIN: CPT

## 2023-04-23 RX ORDER — AMOXICILLIN 875 MG/1
875 TABLET, COATED ORAL 2 TIMES DAILY
Qty: 20 TABLET | Refills: 0 | Status: SHIPPED | OUTPATIENT
Start: 2023-04-23 | End: 2023-05-03

## 2023-04-23 RX ORDER — AZITHROMYCIN 250 MG/1
TABLET, FILM COATED ORAL
Qty: 6 TABLET | Refills: 0 | Status: SHIPPED | OUTPATIENT
Start: 2023-04-23 | End: 2023-04-28 | Stop reason: ALTCHOICE

## 2023-04-23 RX ORDER — ACETAMINOPHEN 500 MG
1000 TABLET ORAL ONCE
Status: COMPLETED | OUTPATIENT
Start: 2023-04-23 | End: 2023-04-23

## 2023-04-23 NOTE — DISCHARGE INSTRUCTIONS
Take both antibiotics as written. For any deterioration including chest pain, shortness of breath, weakness, report to the ER.   Recommend recheck by primary care physician in 1 week for repeat chest x-ray

## 2023-04-28 ENCOUNTER — OFFICE VISIT (OUTPATIENT)
Dept: INTERNAL MEDICINE CLINIC | Facility: CLINIC | Age: 63
End: 2023-04-28
Payer: COMMERCIAL

## 2023-04-28 VITALS
TEMPERATURE: 98 F | BODY MASS INDEX: 26.29 KG/M2 | WEIGHT: 154 LBS | OXYGEN SATURATION: 98 % | HEIGHT: 64 IN | SYSTOLIC BLOOD PRESSURE: 160 MMHG | HEART RATE: 80 BPM | DIASTOLIC BLOOD PRESSURE: 92 MMHG

## 2023-04-28 DIAGNOSIS — I10 PRIMARY HYPERTENSION: ICD-10-CM

## 2023-04-28 DIAGNOSIS — E11.65 TYPE 2 DIABETES MELLITUS WITH HYPERGLYCEMIA, WITH LONG-TERM CURRENT USE OF INSULIN (HCC): ICD-10-CM

## 2023-04-28 DIAGNOSIS — Z95.1 S/P CABG X 5: ICD-10-CM

## 2023-04-28 DIAGNOSIS — N18.30 CKD STAGE 3 SECONDARY TO DIABETES (HCC): ICD-10-CM

## 2023-04-28 DIAGNOSIS — Z79.4 TYPE 2 DIABETES MELLITUS WITH HYPERGLYCEMIA, WITH LONG-TERM CURRENT USE OF INSULIN (HCC): ICD-10-CM

## 2023-04-28 DIAGNOSIS — I25.810 CORONARY ARTERY DISEASE INVOLVING AUTOLOGOUS VEIN CORONARY BYPASS GRAFT WITHOUT ANGINA PECTORIS: Primary | ICD-10-CM

## 2023-04-28 DIAGNOSIS — J18.9 COMMUNITY ACQUIRED PNEUMONIA, UNSPECIFIED LATERALITY: ICD-10-CM

## 2023-04-28 DIAGNOSIS — R80.9 ALBUMINURIA: ICD-10-CM

## 2023-04-28 DIAGNOSIS — E11.22 CKD STAGE 3 SECONDARY TO DIABETES (HCC): ICD-10-CM

## 2023-04-28 PROCEDURE — 3077F SYST BP >= 140 MM HG: CPT | Performed by: FAMILY MEDICINE

## 2023-04-28 PROCEDURE — 3008F BODY MASS INDEX DOCD: CPT | Performed by: FAMILY MEDICINE

## 2023-04-28 PROCEDURE — 3080F DIAST BP >= 90 MM HG: CPT | Performed by: FAMILY MEDICINE

## 2023-04-28 PROCEDURE — 99214 OFFICE O/P EST MOD 30 MIN: CPT | Performed by: FAMILY MEDICINE

## 2023-04-28 RX ORDER — AMLODIPINE BESYLATE 5 MG/1
5 TABLET ORAL DAILY
Qty: 90 TABLET | Refills: 0 | Status: SHIPPED | OUTPATIENT
Start: 2023-04-28 | End: 2023-07-27

## 2023-07-31 ENCOUNTER — OFFICE VISIT (OUTPATIENT)
Dept: ENDOCRINOLOGY CLINIC | Facility: CLINIC | Age: 63
End: 2023-07-31
Payer: COMMERCIAL

## 2023-07-31 VITALS
OXYGEN SATURATION: 98 % | HEART RATE: 74 BPM | WEIGHT: 158.38 LBS | DIASTOLIC BLOOD PRESSURE: 68 MMHG | BODY MASS INDEX: 27 KG/M2 | RESPIRATION RATE: 16 BRPM | SYSTOLIC BLOOD PRESSURE: 140 MMHG

## 2023-07-31 DIAGNOSIS — Z79.4 TYPE 2 DIABETES MELLITUS WITH HYPERGLYCEMIA, WITH LONG-TERM CURRENT USE OF INSULIN (HCC): Primary | ICD-10-CM

## 2023-07-31 DIAGNOSIS — E11.65 TYPE 2 DIABETES MELLITUS WITH HYPERGLYCEMIA, WITH LONG-TERM CURRENT USE OF INSULIN (HCC): Primary | ICD-10-CM

## 2023-07-31 DIAGNOSIS — E78.5 HYPERLIPIDEMIA WITH TARGET LOW DENSITY LIPOPROTEIN (LDL) CHOLESTEROL LESS THAN 70 MG/DL: ICD-10-CM

## 2023-07-31 DIAGNOSIS — N28.9 NEPHROPATHY: ICD-10-CM

## 2023-07-31 DIAGNOSIS — I10 PRIMARY HYPERTENSION: ICD-10-CM

## 2023-07-31 DIAGNOSIS — I25.810 CORONARY ARTERY DISEASE INVOLVING AUTOLOGOUS VEIN CORONARY BYPASS GRAFT WITHOUT ANGINA PECTORIS: ICD-10-CM

## 2023-07-31 LAB
CARTRIDGE LOT#: 603 NUMERIC
HEMOGLOBIN A1C: 8.7 % (ref 4.3–5.6)

## 2023-07-31 PROCEDURE — 83036 HEMOGLOBIN GLYCOSYLATED A1C: CPT | Performed by: NURSE PRACTITIONER

## 2023-07-31 PROCEDURE — 3077F SYST BP >= 140 MM HG: CPT | Performed by: NURSE PRACTITIONER

## 2023-07-31 PROCEDURE — 99215 OFFICE O/P EST HI 40 MIN: CPT | Performed by: NURSE PRACTITIONER

## 2023-07-31 PROCEDURE — 3078F DIAST BP <80 MM HG: CPT | Performed by: NURSE PRACTITIONER

## 2023-07-31 PROCEDURE — 95251 CONT GLUC MNTR ANALYSIS I&R: CPT | Performed by: NURSE PRACTITIONER

## 2023-07-31 RX ORDER — INSULIN LISPRO 100 [IU]/ML
INJECTION, SOLUTION INTRAVENOUS; SUBCUTANEOUS
Qty: 15 ML | Refills: 0 | Status: SHIPPED | OUTPATIENT
Start: 2023-07-31

## 2023-10-07 ENCOUNTER — PATIENT MESSAGE (OUTPATIENT)
Dept: INTERNAL MEDICINE CLINIC | Facility: CLINIC | Age: 63
End: 2023-10-07

## 2023-10-07 DIAGNOSIS — E11.65 TYPE 2 DIABETES MELLITUS WITH HYPERGLYCEMIA, WITH LONG-TERM CURRENT USE OF INSULIN (HCC): ICD-10-CM

## 2023-10-07 DIAGNOSIS — Z79.4 TYPE 2 DIABETES MELLITUS WITH HYPERGLYCEMIA, WITH LONG-TERM CURRENT USE OF INSULIN (HCC): ICD-10-CM

## 2023-10-09 NOTE — TELEPHONE ENCOUNTER
SGLT2 recommended per Primo Rodriguez, but cost barrier was noted at last visit. Will message patient. Script was sent by PCP in April, again on 10/03/2023 by PCP to [facility]. LOV: 07/31/2023  Future Appointments   Date Time Provider Pk Jackson   11/14/2023  9:00 AM RUSS Pedroza [facility] [address] [address]     Last A1c value was 8.7% done 7/31/2023.

## 2023-10-09 NOTE — TELEPHONE ENCOUNTER
Medication(s) to Refill:   Requested Prescriptions     Pending Prescriptions Disp Refills    Continuous Blood Gluc Sensor (FREESTYLE CHARLEE 2 SENSOR) Does not apply Misc 2 each 11     Si each every 14 (fourteen) days.      Last Time Medication was Filled:  11/15/22    Recent Visits  Date Type Provider Dept   23 Office Visit Merna Can MD [facility] [address] [facility] Appointments  No visits were found

## 2023-10-09 NOTE — TELEPHONE ENCOUNTER
From: Susana Salomon  To: Sal Patiño  Sent: 10/7/2023 2:53 PM CDT  Subject: Medication    [facility] no longer refills dapagliflozin is there a way you can transfer this to jozef alfonso?      Jozef Escobar ''R''  [phone]

## 2023-11-14 ENCOUNTER — HOSPITAL ENCOUNTER (OUTPATIENT)
Age: 63
Discharge: HOME | End: 2023-11-14
Payer: COMMERCIAL

## 2023-11-14 ENCOUNTER — APPOINTMENT (OUTPATIENT)
Dept: GENERAL RADIOLOGY | Age: 63
End: 2023-11-14
Attending: NURSE PRACTITIONER
Payer: COMMERCIAL

## 2023-11-14 VITALS
HEART RATE: 90 BPM | TEMPERATURE: 98 F | DIASTOLIC BLOOD PRESSURE: 83 MMHG | RESPIRATION RATE: 16 BRPM | OXYGEN SATURATION: 99 % | SYSTOLIC BLOOD PRESSURE: 174 MMHG

## 2023-11-14 DIAGNOSIS — H65.91 RIGHT NON-SUPPURATIVE OTITIS MEDIA: ICD-10-CM

## 2023-11-14 DIAGNOSIS — J18.9 COMMUNITY ACQUIRED PNEUMONIA OF RIGHT LOWER LOBE OF LUNG: Primary | ICD-10-CM

## 2023-11-14 LAB — SARS-COV-2 RNA RESP QL NAA+PROBE: NOT DETECTED

## 2023-11-14 PROCEDURE — 99214 OFFICE O/P EST MOD 30 MIN: CPT

## 2023-11-14 PROCEDURE — 99213 OFFICE O/P EST LOW 20 MIN: CPT

## 2023-11-14 PROCEDURE — 71046 X-RAY EXAM CHEST 2 VIEWS: CPT | Performed by: NURSE PRACTITIONER

## 2023-11-14 RX ORDER — FLUTICASONE PROPIONATE 50 MCG
2 SPRAY, SUSPENSION (ML) NASAL DAILY
Qty: 16 G | Refills: 0 | Status: SHIPPED | OUTPATIENT
Start: 2023-11-14 | End: 2023-12-14

## 2023-11-14 RX ORDER — AZITHROMYCIN 250 MG/1
TABLET, FILM COATED ORAL
Qty: 6 TABLET | Refills: 0 | Status: SHIPPED | OUTPATIENT
Start: 2023-11-14

## 2023-11-14 RX ORDER — AMOXICILLIN AND CLAVULANATE POTASSIUM 875; 125 MG/1; MG/1
1 TABLET, FILM COATED ORAL 2 TIMES DAILY
Qty: 14 TABLET | Refills: 0 | Status: SHIPPED | OUTPATIENT
Start: 2023-11-14 | End: 2023-11-21

## 2023-11-14 RX ORDER — BENZONATATE 100 MG/1
100 CAPSULE ORAL 3 TIMES DAILY PRN
Qty: 30 CAPSULE | Refills: 0 | Status: SHIPPED | OUTPATIENT
Start: 2023-11-14 | End: 2023-12-14

## 2023-11-14 NOTE — ED INITIAL ASSESSMENT (HPI)
Patient here with complaints of productive cough with green mucus, chills, body aches, bilateral ear pain with right ear being worse x 2 weeks. States sore throat at night time. Denies any fevers or other symptoms. States he has been taking nyquil and mucinex PRN.

## 2023-11-14 NOTE — DISCHARGE INSTRUCTIONS
COVID test is negative today. Pneumonia    Antibiotics as directed. antibiotic is finished. Benzonatate cough pills as directed if needed. Drink plenty of fluids  Rest  Acetaminophen as directed on packaging if needed. Follow up with your primary doctor. You may need a repeat CXR in the future to assure resolution of pneumonia  Prescription cough medicine may cause drowsiness. Do not drive or operate machinery while taking this medicine. Seek immediate medical attention if you develop fever, increased coughing, shortness of breath, coughing up phlegm, chest pain, weakness. For your itchy eyes:  Flonase nasal spray as directed. You may take an over-the-counter antihistamine such as Claritin, Allegra or Zyrtec    Advise using a coolmist humidifier. Vicks vapor rub to your chest.  You may take over-the-counter Coricidin HBP as directed on packaging if needed. You may take over-the-counter plain Mucinex/guaifenesin to help thin your respiratory secretions. Salt water gargles for your sore throat; 1/2 to 1 teaspoon of table salt in 8 ounces of water. Gargle and spit throughout the day. You may also suck on cough drops or throat lozenges or use over-the-counter throat sprays for your sore throat.     Seek immediate medical attention if you develop chest pain, shortness of breath, oxygen levels below 90% or feeling weak or dehydrated

## 2023-12-04 ENCOUNTER — TELEPHONE (OUTPATIENT)
Dept: ENDOCRINOLOGY CLINIC | Facility: CLINIC | Age: 63
End: 2023-12-04

## 2023-12-04 NOTE — TELEPHONE ENCOUNTER
Pt is due for Diabetic exam. Lvm to schedule with Chani Draper. On either my chart or call the office.

## 2023-12-08 DIAGNOSIS — I10 PRIMARY HYPERTENSION: ICD-10-CM

## 2023-12-08 RX ORDER — AMLODIPINE BESYLATE 5 MG/1
5 TABLET ORAL DAILY
Qty: 30 TABLET | Refills: 0 | Status: SHIPPED | OUTPATIENT
Start: 2023-12-08 | End: 2024-01-17

## 2023-12-08 NOTE — TELEPHONE ENCOUNTER
Last VISIT:2023 MD SAMARA    Last CPE:11/15/2022 MD SAMARA    Last REFILL: 2023   Medication Quantity Refills Start End   amLODIPine 5 MG Oral Tab () 90 tablet 0         Last LABS:2023    No future appointments.      Per PROTOCOL? Non Protocol    Please Approve or Deny.

## 2023-12-19 ENCOUNTER — HOSPITAL ENCOUNTER (OUTPATIENT)
Dept: GENERAL RADIOLOGY | Age: 63
Discharge: HOME | End: 2023-12-19
Attending: FAMILY MEDICINE
Payer: COMMERCIAL

## 2023-12-19 ENCOUNTER — LABORATORY ENCOUNTER (OUTPATIENT)
Dept: LAB | Age: 63
End: 2023-12-19
Attending: NURSE PRACTITIONER
Payer: COMMERCIAL

## 2023-12-19 DIAGNOSIS — J18.9 COMMUNITY ACQUIRED PNEUMONIA, UNSPECIFIED LATERALITY: ICD-10-CM

## 2023-12-19 PROCEDURE — 83036 HEMOGLOBIN GLYCOSYLATED A1C: CPT | Performed by: NURSE PRACTITIONER

## 2023-12-19 PROCEDURE — 36415 COLL VENOUS BLD VENIPUNCTURE: CPT | Performed by: NURSE PRACTITIONER

## 2023-12-19 PROCEDURE — 71046 X-RAY EXAM CHEST 2 VIEWS: CPT | Performed by: FAMILY MEDICINE

## 2023-12-20 LAB
EST. AVERAGE GLUCOSE BLD GHB EST-MCNC: 186 MG/DL (ref 68–126)
HBA1C MFR BLD: 8.1 % (ref ?–5.7)

## 2023-12-26 ENCOUNTER — TELEPHONE (OUTPATIENT)
Dept: ENDOCRINOLOGY CLINIC | Facility: CLINIC | Age: 63
End: 2023-12-26

## 2023-12-27 NOTE — TELEPHONE ENCOUNTER
Printed letter twice sending through regular mail and certified letter as well     Certified letter:  [phone]

## 2024-01-02 ENCOUNTER — TELEPHONE (OUTPATIENT)
Dept: INTERNAL MEDICINE CLINIC | Facility: CLINIC | Age: 64
End: 2024-01-02

## 2024-01-02 DIAGNOSIS — E11.65 TYPE 2 DIABETES MELLITUS WITH HYPERGLYCEMIA, WITH LONG-TERM CURRENT USE OF INSULIN (HCC): ICD-10-CM

## 2024-01-02 DIAGNOSIS — Z00.00 ROUTINE GENERAL MEDICAL EXAMINATION AT A HEALTH CARE FACILITY: Primary | ICD-10-CM

## 2024-01-02 DIAGNOSIS — Z13.220 SCREENING FOR LIPID DISORDERS: ICD-10-CM

## 2024-01-02 DIAGNOSIS — Z12.5 SCREENING FOR MALIGNANT NEOPLASM OF PROSTATE: ICD-10-CM

## 2024-01-02 DIAGNOSIS — Z13.29 SCREENING FOR THYROID DISORDER: ICD-10-CM

## 2024-01-02 DIAGNOSIS — Z13.0 SCREENING FOR DISORDER OF BLOOD AND BLOOD-FORMING ORGANS: ICD-10-CM

## 2024-01-02 DIAGNOSIS — Z13.228 SCREENING FOR METABOLIC DISORDER: ICD-10-CM

## 2024-01-02 DIAGNOSIS — Z79.4 TYPE 2 DIABETES MELLITUS WITH HYPERGLYCEMIA, WITH LONG-TERM CURRENT USE OF INSULIN (HCC): ICD-10-CM

## 2024-01-16 ENCOUNTER — OFFICE VISIT (OUTPATIENT)
Dept: INTERNAL MEDICINE CLINIC | Facility: CLINIC | Age: 64
End: 2024-01-16
Payer: COMMERCIAL

## 2024-01-16 VITALS
HEART RATE: 93 BPM | DIASTOLIC BLOOD PRESSURE: 90 MMHG | OXYGEN SATURATION: 99 % | HEIGHT: 64 IN | WEIGHT: 159 LBS | BODY MASS INDEX: 27.14 KG/M2 | SYSTOLIC BLOOD PRESSURE: 142 MMHG | TEMPERATURE: 97 F

## 2024-01-16 DIAGNOSIS — Z12.11 SCREENING FOR COLON CANCER: ICD-10-CM

## 2024-01-16 DIAGNOSIS — E11.65 TYPE 2 DIABETES MELLITUS WITH HYPERGLYCEMIA, WITH LONG-TERM CURRENT USE OF INSULIN (HCC): Primary | ICD-10-CM

## 2024-01-16 DIAGNOSIS — I25.810 CORONARY ARTERY DISEASE INVOLVING AUTOLOGOUS VEIN CORONARY BYPASS GRAFT WITHOUT ANGINA PECTORIS: ICD-10-CM

## 2024-01-16 DIAGNOSIS — Z87.01 HISTORY OF PNEUMONIA: ICD-10-CM

## 2024-01-16 DIAGNOSIS — Z79.4 TYPE 2 DIABETES MELLITUS WITH HYPERGLYCEMIA, WITH LONG-TERM CURRENT USE OF INSULIN (HCC): Primary | ICD-10-CM

## 2024-01-16 DIAGNOSIS — E08.311 DIABETIC RETINOPATHY OF BOTH EYES WITH MACULAR EDEMA ASSOCIATED WITH DIABETES MELLITUS DUE TO UNDERLYING CONDITION, UNSPECIFIED RETINOPATHY SEVERITY (HCC): ICD-10-CM

## 2024-01-16 DIAGNOSIS — I10 PRIMARY HYPERTENSION: ICD-10-CM

## 2024-01-16 DIAGNOSIS — E11.22 CKD STAGE 3 SECONDARY TO DIABETES (HCC): ICD-10-CM

## 2024-01-16 DIAGNOSIS — Z95.1 S/P CABG X 5: ICD-10-CM

## 2024-01-16 DIAGNOSIS — N18.30 CKD STAGE 3 SECONDARY TO DIABETES (HCC): ICD-10-CM

## 2024-01-16 PROCEDURE — 90471 IMMUNIZATION ADMIN: CPT | Performed by: FAMILY MEDICINE

## 2024-01-16 PROCEDURE — 3077F SYST BP >= 140 MM HG: CPT | Performed by: FAMILY MEDICINE

## 2024-01-16 PROCEDURE — 3008F BODY MASS INDEX DOCD: CPT | Performed by: FAMILY MEDICINE

## 2024-01-16 PROCEDURE — 90677 PCV20 VACCINE IM: CPT | Performed by: FAMILY MEDICINE

## 2024-01-16 PROCEDURE — 99214 OFFICE O/P EST MOD 30 MIN: CPT | Performed by: FAMILY MEDICINE

## 2024-01-16 PROCEDURE — 3080F DIAST BP >= 90 MM HG: CPT | Performed by: FAMILY MEDICINE

## 2024-01-17 ENCOUNTER — TELEPHONE (OUTPATIENT)
Dept: INTERNAL MEDICINE CLINIC | Facility: CLINIC | Age: 64
End: 2024-01-17

## 2024-01-17 ENCOUNTER — TELEPHONE (OUTPATIENT)
Dept: ENDOCRINOLOGY CLINIC | Facility: CLINIC | Age: 64
End: 2024-01-17

## 2024-01-17 DIAGNOSIS — Z12.11 SCREENING FOR COLON CANCER: Primary | ICD-10-CM

## 2024-01-17 RX ORDER — AMLODIPINE BESYLATE 5 MG/1
5 TABLET ORAL DAILY
Qty: 90 TABLET | Refills: 0 | Status: SHIPPED | OUTPATIENT
Start: 2024-01-17

## 2024-01-17 RX ORDER — CARVEDILOL 25 MG/1
25 TABLET ORAL 2 TIMES DAILY
Qty: 180 TABLET | Refills: 0 | Status: SHIPPED | OUTPATIENT
Start: 2024-01-17

## 2024-01-17 RX ORDER — CLOPIDOGREL BISULFATE 75 MG/1
75 TABLET ORAL DAILY
Qty: 90 TABLET | Refills: 0 | Status: SHIPPED | OUTPATIENT
Start: 2024-01-17

## 2024-01-17 RX ORDER — ATORVASTATIN CALCIUM 40 MG/1
40 TABLET, FILM COATED ORAL DAILY
Qty: 90 TABLET | Refills: 0 | Status: SHIPPED | OUTPATIENT
Start: 2024-01-17

## 2024-01-17 RX ORDER — INSULIN LISPRO 100 [IU]/ML
INJECTION, SOLUTION INTRAVENOUS; SUBCUTANEOUS
Qty: 15 ML | Refills: 0 | Status: SHIPPED | OUTPATIENT
Start: 2024-01-17

## 2024-01-17 RX ORDER — EZETIMIBE 10 MG/1
10 TABLET ORAL DAILY
Qty: 90 TABLET | Refills: 0 | Status: SHIPPED | OUTPATIENT
Start: 2024-01-17

## 2024-01-17 RX ORDER — INSULIN GLARGINE 100 [IU]/ML
INJECTION, SOLUTION SUBCUTANEOUS
Qty: 18 EACH | Refills: 0 | Status: SHIPPED | OUTPATIENT
Start: 2024-01-17

## 2024-01-17 RX ORDER — LOSARTAN POTASSIUM 100 MG/1
100 TABLET ORAL DAILY
Qty: 90 TABLET | Refills: 0 | Status: SHIPPED | OUTPATIENT
Start: 2024-01-17

## 2024-01-17 RX ORDER — ASPIRIN 325 MG
TABLET ORAL
Qty: 90 TABLET | Refills: 0 | Status: SHIPPED | OUTPATIENT
Start: 2024-01-17

## 2024-01-17 NOTE — PROGRESS NOTES
Roldan Mast  6/11/1960    Chief Complaint   Patient presents with    Physical       HPI:   Roldan Mast is a 63 year old male who presents for follow-up. His recent A1c shows improvement from prior in Feb of last year. He has had inconsistent follow-up with [facility]. He has been consistent with his treatment but was unable obtain Farxiga due to cost. He has not followed up with cardiology yet. He completed his antibiotic course for his PNA and his follow-up XR shows resolution of his consolidation with persistent bilateral effusions. Cough and SOB has resolved.     Current Outpatient Medications   Medication Sig Dispense Refill    empagliflozin (JARDIANCE) 10 MG Oral Tab Take 1 tablet (10 mg total) by mouth daily. 90 tablet 0    amLODIPine 5 MG Oral Tab Take 1 tablet by mouth once daily 30 tablet 0    azithromycin (ZITHROMAX Z-KENAN) 250 MG Oral Tab 500 mg once followed by 250 mg daily x 4 days 6 tablet 0    Continuous Blood Gluc Sensor (FREESTYLE CHARLEE 2 SENSOR) Does not apply Misc 1 each every 14 (fourteen) days. 2 each 11    Insulin Lispro, 1 Unit Dial, (HUMALOG KWIKPEN) 100 UNIT/ML Subcutaneous Solution Pen-injector 10- 15 units  in divided doses twice daily 15 mL 0    Alcohol Swabs (BD SWAB SINGLE USE REGULAR) Does not apply Pads       carvedilol 25 MG Oral Tab Take 1 tablet (25 mg total) by mouth in the morning and 1 tablet (25 mg total) before bedtime. 180 tablet 0    insulin glargine (BASAGLAR KWIKPEN) 100 UNIT/ML Subcutaneous Solution Pen-injector 28 units BID 18 each 0    atorvastatin 40 MG Oral Tab Take 1 tablet (40 mg total) by mouth daily. 90 tablet 0    clopidogrel 75 MG Oral Tab Take 1 tablet (75 mg total) by mouth daily. 90 tablet 0    ezetimibe 10 MG Oral Tab Take 1 tablet (10 mg total) by mouth daily. 90 tablet 0    losartan 100 MG Oral Tab Take 1 tablet (100 mg total) by mouth daily. 90 tablet 0    Cholecalciferol (VITAMIN D) 1000 UNITS Oral Tab Take 1 tablet by mouth 2 (two) times daily. 180  tablet 2    aspirin 325 MG Oral Tab Take 1 tablet daily. 90 tabs 3 refills 90 tablet 3      Allergies   Allergen Reactions    Adenosine     Spironolactone       Past Medical History:   Diagnosis Date    CAD (coronary artery disease) In 2006 and 2010    Stents x 2 - once in 2006 after MI and prior to CABG and again in 2010.    CAD (coronary artery disease), autologous vein bypass graft 05/13/2014    DM type 2, uncontrolled, with renal complications 05/13/2014    Essential hypertension     Hyperlipidemia     Hyperlipidemia LDL goal < 70     Hypertension     Hypertension, renal disease 05/13/2014    Hypertensive heart/renal disease with failure 11/17/2014    S/P CABG x 5 in 2006    CABG x 5 in 2006    Systolic CHF (formerly [facility]) 11/17/2014    Type 1 diabetes mellitus (formerly [facility])       Patient Active Problem List   Diagnosis    Vitamin D deficiency    S/P coronary artery stent placement    Hyperlipidemia with target low density lipoprotein (LDL) cholesterol less than 70 mg/dL    S/P CABG x 5    CAD (coronary artery disease), autologous vein bypass graft    Diabetic retinopathy of both eyes (formerly [facility])    Systolic CHF (formerly [facility])    Type 2 diabetes mellitus with hyperglycemia, with long-term current use of insulin (formerly [facility])    Primary hypertension    Esophageal reflux    Abdominal pain, chronic, left upper quadrant    CKD stage 3 secondary to diabetes (formerly [facility])      Past Surgical History:   Procedure Laterality Date    ANGIOPLASTY (CORONARY)  2006    stent x2    ANGIOPLASTY (CORONARY)  2010    stent x1    CABG  2006    CABG x 5    OTHER SURGICAL HISTORY  2007    Bilateral lens replacement    OTHER SURGICAL HISTORY  2009    Lasik bilaterally    TONSILLECTOMY  At age 11      Family History   Problem Relation Age of Onset    Diabetes Mother         Type 2 DM on oral agents    Thyroid Disorder Mother         Thyroid history - not able to elaborate    Other (Other) Mother         See thyroid tab    Heart Disorder Maternal Grandmother         CAD    Diabetes  Paternal Grandmother         Type 2 DM    Diabetes Brother         Type 2 DM on oral agents    Diabetes Daughter         Type 2 DM on oral agents    Diabetes Daughter         Type 2 DM on oral agents    Thyroid Disorder Daughter         Thyroid history - unable to elaborate    Other (Other) Daughter         See thyroid tab    Diabetes Brother         Type 2 DM on oral agents    Eye Problems Neg       Social History     Socioeconomic History    Marital status:     Number of children: 4   Occupational History    Occupation: Auto repair   Tobacco Use    Smoking status: Never    Smokeless tobacco: Never   Vaping Use    Vaping Use: Never used   Substance and Sexual Activity    Alcohol use: Not Currently     Comment: occ    Drug use: No    Sexual activity: Not Currently   Other Topics Concern    Caffeine Concern Yes     Comment: 2-3 cups of coffee weekly    Exercise No    Seat Belt Yes         REVIEW OF SYSTEMS:   GENERAL: no recent illness, no new SOB or anginal symptoms.     EXAM:   /90 (BP Location: Right arm, Patient Position: Sitting, Cuff Size: adult)   Pulse 93   Temp 97.1 °F (36.2 °C) (Temporal)   Ht 5' 4\" (1.626 m)   Wt 159 lb (72.1 kg)   SpO2 99%   BMI 27.29 kg/m²   GENERAL: Well developed, well nourished,in no apparent distress  SKIN: No rash  EYES: PERRLA, EOMI, conjunctiva are clear  HEENT: atraumatic, normocephalic  NECK: supple  LUNGS: clear to auscultation  CARDIO: RRR without murmur    ASSESSMENT AND PLAN:   Roldan Mast is a 63 year old male who presents for follow-up    Uncontrolled IDDM  Diabetic retinopathy  CKD stage III  Encouraged follow-up with [facility] and will continue current insulin regimen for now. Will try to obtain coverage for Jardiance. Prevnar given today. Schedule with ophthalmologist and complete labs prior to our follow-up visit in 3 months.   - Prevnar 20 (PCV20) [14449]  - empagliflozin (JARDIANCE) 10 MG Oral Tab; Take 1 tablet (10 mg total) by mouth daily.   Dispense: 90 tablet; Refill: 0    Primary hypertension  Borderline control. Will continue current treatment for now and follow-up at next appt.   - amLODIPine 5 MG Oral Tab; Take 1 tablet (5 mg total) by mouth daily.  Dispense: 90 tablet; Refill: 0    CAD S/P CABG x 5  Encouraged follow-up with cardiology. Referral information provided.     History of pneumonia  Completed antibiotic course and     Screening for colon cancer  [facility] not in network for patient's insurance. Referral placed for colonoscopy with Dr. Rinaldi.     Thank you,  Say Ravi MD

## 2024-01-17 NOTE — TELEPHONE ENCOUNTER
Out of Network Provider  Received: Yesterday  Mansi Mariely  [facility] Clinical Staff  Good Morning,    The Rendering Provider, Dr Kulwant Lara, is out of network with the patient's [facility] EXCHANGE insurance plan. I am unable to process this request at this time. Please update referral with an in network Rendering Provider and I will be happy to reprocess this referral request.  Please advise the patient to contact [facility] to obtain the name of an In Network Provider.    Thank you,  Malu   Referral  Referral # [id number]            Referral Information    Referral # Creation Date Referral Status Status Update    [id number] 01/16/2024 Denied 01/16/2024: Status History        Status Reason Referral Type Referral Reasons Referral Class   Provider Not In Network OFFICE VISIT none Internal        To Specialty To Provider To Location/Place of Service To Department   GASTROENTEROLOGY Kulwant Lara MD none none        To Vendor Referred By By Location/Place of Service By Department   none Say Ravi MD [address]        Priority Start Date Expiration Date Referral Entered By   Routine 01/16/2024 01/15/2025 Say Ravi MD        Visits Requested Visits Authorized Visits Completed Visits Scheduled   1 0           Procedure Information    Service Details  Procedure Modifiers Revenue Code Provider Requested Approved   [id number] - GASTRO - INTERNAL none none Kulwant Lara MD 1 0       Diagnosis Information    Diagnosis   Z12.11 (ICD-10-CM) - V76.51 (ICD-9-CM) - Screening for colon cancer

## 2024-01-17 NOTE — TELEPHONE ENCOUNTER
MK,  do you want to refer elsewhere or have the patient call his insurance for a covered provider?

## 2024-01-18 NOTE — TELEPHONE ENCOUNTER
Pharmacy staff  Gretchen wanted to know how many he should be taking and how often. She stated the the instructions were not clear.          Medication Quantity Refills Start End   Insulin Lispro, 1 Unit Dial, (HUMALOG KWIKPEN) 100 UNIT/ML Subcutaneous Solution Pen-injector 15 mL 0 1/17/2024 --   Sig:   10- 15 units  in divided doses twice daily     Route:   (none)

## 2024-01-19 RX ORDER — INSULIN LISPRO 100 [IU]/ML
INJECTION, SOLUTION INTRAVENOUS; SUBCUTANEOUS
Qty: 15 ML | Refills: 0 | OUTPATIENT
Start: 2024-01-19

## 2024-02-27 ENCOUNTER — TELEPHONE (OUTPATIENT)
Dept: INTERNAL MEDICINE CLINIC | Facility: CLINIC | Age: 64
End: 2024-02-27

## 2024-02-27 NOTE — TELEPHONE ENCOUNTER
FYI to MK.    Not holding any labs or imaging for the appt.     LM for pt to call back to schedule.

## 2024-03-08 ENCOUNTER — TELEPHONE (OUTPATIENT)
Dept: INTERNAL MEDICINE CLINIC | Facility: CLINIC | Age: 64
End: 2024-03-08

## 2024-03-08 NOTE — TELEPHONE ENCOUNTER
Received fax from [facility] requesting medical clearance for tooth extractions today 3/8/24. Tried to get in contact with patient to see if he went through with it, no answer. Placed in MK bin and discussed with him. Per pcp, please try to get in contact with pt to schedule a pre-op visit.

## 2024-03-09 NOTE — TELEPHONE ENCOUNTER
Future Appointments   Date Time Provider Department Center   3/19/2024  9:00 AM Say Ravi MD [facility]

## 2024-03-19 ENCOUNTER — OFFICE VISIT (OUTPATIENT)
Dept: INTERNAL MEDICINE CLINIC | Facility: CLINIC | Age: 64
End: 2024-03-19
Payer: COMMERCIAL

## 2024-03-19 VITALS
OXYGEN SATURATION: 99 % | TEMPERATURE: 98 F | SYSTOLIC BLOOD PRESSURE: 160 MMHG | BODY MASS INDEX: 27 KG/M2 | DIASTOLIC BLOOD PRESSURE: 90 MMHG | HEART RATE: 72 BPM | WEIGHT: 154.81 LBS

## 2024-03-19 DIAGNOSIS — Z95.1 S/P CABG X 5: ICD-10-CM

## 2024-03-19 DIAGNOSIS — I50.22 CHRONIC SYSTOLIC CONGESTIVE HEART FAILURE (HCC): ICD-10-CM

## 2024-03-19 DIAGNOSIS — K08.9 DENTAL DISEASE: ICD-10-CM

## 2024-03-19 DIAGNOSIS — E11.22 CKD STAGE 3 SECONDARY TO DIABETES (HCC): ICD-10-CM

## 2024-03-19 DIAGNOSIS — Z95.5 S/P CORONARY ARTERY STENT PLACEMENT: ICD-10-CM

## 2024-03-19 DIAGNOSIS — Z01.818 PREOP EXAMINATION: Primary | ICD-10-CM

## 2024-03-19 DIAGNOSIS — E10.22 TYPE 1 DM WITH CKD STAGE 3 AND HYPERTENSION (HCC): ICD-10-CM

## 2024-03-19 DIAGNOSIS — N18.30 TYPE 1 DM WITH CKD STAGE 3 AND HYPERTENSION (HCC): ICD-10-CM

## 2024-03-19 DIAGNOSIS — N18.30 CKD STAGE 3 SECONDARY TO DIABETES (HCC): ICD-10-CM

## 2024-03-19 DIAGNOSIS — I12.9 TYPE 1 DM WITH CKD STAGE 3 AND HYPERTENSION (HCC): ICD-10-CM

## 2024-03-19 DIAGNOSIS — I25.810 CORONARY ARTERY DISEASE INVOLVING AUTOLOGOUS VEIN CORONARY BYPASS GRAFT WITHOUT ANGINA PECTORIS: ICD-10-CM

## 2024-03-19 PROCEDURE — 99214 OFFICE O/P EST MOD 30 MIN: CPT | Performed by: FAMILY MEDICINE

## 2024-03-20 NOTE — PROGRESS NOTES
Roldan Mast  6/11/1960    Chief Complaint   Patient presents with    Pre-Op Exam       HPI:   Roldan Mast is a 63 year old male who presents for preoperative examination prior to a dental surgery.  He has planned extraction and possible implant.  He has not followed up with cardiology since her last visit.  Has not completed labs.    Current Outpatient Medications   Medication Sig Dispense Refill    amLODIPine 5 MG Oral Tab Take 1 tablet (5 mg total) by mouth daily. 90 tablet 0    aspirin 325 MG Oral Tab Take 1 tablet daily. 90 tabs 3 refills 90 tablet 0    atorvastatin 40 MG Oral Tab Take 1 tablet (40 mg total) by mouth daily. 90 tablet 0    carvedilol 25 MG Oral Tab Take 1 tablet (25 mg total) by mouth in the morning and 1 tablet (25 mg total) before bedtime. 180 tablet 0    clopidogrel 75 MG Oral Tab Take 1 tablet (75 mg total) by mouth daily. 90 tablet 0    ezetimibe 10 MG Oral Tab Take 1 tablet (10 mg total) by mouth daily. 90 tablet 0    losartan 100 MG Oral Tab Take 1 tablet (100 mg total) by mouth daily. 90 tablet 0    Insulin Lispro, 1 Unit Dial, (HUMALOG KWIKPEN) 100 UNIT/ML Subcutaneous Solution Pen-injector 10- 15 units  in divided doses twice daily 15 mL 0    empagliflozin (JARDIANCE) 10 MG Oral Tab Take 1 tablet (10 mg total) by mouth daily. 90 tablet 0    azithromycin (ZITHROMAX Z-KENAN) 250 MG Oral Tab 500 mg once followed by 250 mg daily x 4 days 6 tablet 0    Continuous Blood Gluc Sensor (FREESTYLE CHARLEE 2 SENSOR) Does not apply Misc 1 each every 14 (fourteen) days. 2 each 11    Alcohol Swabs (BD SWAB SINGLE USE REGULAR) Does not apply Pads       Cholecalciferol (VITAMIN D) 1000 UNITS Oral Tab Take 1 tablet by mouth 2 (two) times daily. 180 tablet 2    insulin glargine (BASAGLAR KWIKPEN) 100 UNIT/ML Subcutaneous Solution Pen-injector 28 units BID (Patient not taking: Reported on 3/19/2024) 18 each 0      Allergies   Allergen Reactions    Adenosine     Spironolactone       Past Medical  History:   Diagnosis Date    CAD (coronary artery disease) In 2006 and 2010    Stents x 2 - once in 2006 after MI and prior to CABG and again in 2010.    CAD (coronary artery disease), autologous vein bypass graft 05/13/2014    DM type 2, uncontrolled, with renal complications 05/13/2014    Essential hypertension     Hyperlipidemia     Hyperlipidemia LDL goal < 70     Hypertension     Hypertension, renal disease 05/13/2014    Hypertensive heart/renal disease with failure 11/17/2014    S/P CABG x 5 in 2006    CABG x 5 in 2006    Systolic CHF ([facility]) 11/17/2014    Type 1 diabetes mellitus ([facility])       Patient Active Problem List   Diagnosis    Vitamin D deficiency    S/P coronary artery stent placement    Hyperlipidemia with target low density lipoprotein (LDL) cholesterol less than 70 mg/dL    S/P CABG x 5    CAD (coronary artery disease), autologous vein bypass graft    Diabetic retinopathy of both eyes ([facility])    Systolic CHF ([facility])    Type 2 diabetes mellitus with hyperglycemia, with long-term current use of insulin ([facility])    Primary hypertension    Esophageal reflux    Abdominal pain, chronic, left upper quadrant    CKD stage 3 secondary to diabetes ([facility])      Past Surgical History:   Procedure Laterality Date    ANGIOPLASTY (CORONARY)  2006    stent x2    ANGIOPLASTY (CORONARY)  2010    stent x1    CABG  2006    CABG x 5    OTHER SURGICAL HISTORY  2007    Bilateral lens replacement    OTHER SURGICAL HISTORY  2009    Lasik bilaterally    TONSILLECTOMY  At age 11      Family History   Problem Relation Age of Onset    Diabetes Mother         Type 2 DM on oral agents    Thyroid Disorder Mother         Thyroid history - not able to elaborate    Other (Other) Mother         See thyroid tab    Heart Disorder Maternal Grandmother         CAD    Diabetes Paternal Grandmother         Type 2 DM    Diabetes Brother         Type 2 DM on oral agents    Diabetes Daughter         Type 2 DM on oral agents    Diabetes Daughter          Type 2 DM on oral agents    Thyroid Disorder Daughter         Thyroid history - unable to elaborate    Other (Other) Daughter         See thyroid tab    Diabetes Brother         Type 2 DM on oral agents    Eye Problems Neg       Social History     Socioeconomic History    Marital status:     Number of children: 4   Occupational History    Occupation: Auto repair   Tobacco Use    Smoking status: Never    Smokeless tobacco: Never   Vaping Use    Vaping Use: Never used   Substance and Sexual Activity    Alcohol use: Not Currently     Comment: occ    Drug use: No    Sexual activity: Not Currently   Other Topics Concern    Caffeine Concern Yes     Comment: 2-3 cups of coffee weekly    Exercise No    Seat Belt Yes         REVIEW OF SYSTEMS:   GENERAL: no fever or chills  SKIN: no rash  EYES: no vision changes  LUNGS: intermittent cough since his previous pneumonia  CARDIOVASCULAR: no angina  GI: no new abdominal pain    EXAM:   /90 (BP Location: Left arm, Patient Position: Sitting, Cuff Size: adult)   Pulse 72   Temp 97.7 °F (36.5 °C) (Temporal)   Wt 154 lb 12.8 oz (70.2 kg)   SpO2 99%   BMI 26.57 kg/m²   GENERAL: Well developed, well nourished,in no apparent distress  SKIN: No rash  EYES: PERRLA, EOMI, conjunctiva are clear  HEENT: atraumatic, normocephalic  LUNGS: clear to auscultation  CARDIO: RRR without murmur    ASSESSMENT AND PLAN:   Roldan Mast is a 63 year old male who presents for preoperative exam prior to planned dental work.  Overall, his upcoming procedure is low risk.  However, given his significant cardiac history and lack of cardiology follow-up, I recommend delaying the surgery until he is able to have follow-up cardiology evaluation in addition to recommendations regarding his perioperative antiplatelet therapy.  He will follow-up after he reestablishes with his cardiologist for preoperative evaluation    Preoperative Exam  Dental disease  Type 1 DM with CKD stage 3 and  hypertension (HCC)  CAD S/P CABG x 5, S/P coronary artery stent placement  Chronic systolic congestive heart failure (HCC)  - Cardio Referral - Internal      All questions were answered and the patient agrees with the plan.     Thank you,  Say Ravi MD

## 2024-05-17 DIAGNOSIS — Z79.4 TYPE 2 DIABETES MELLITUS WITH HYPERGLYCEMIA, WITH LONG-TERM CURRENT USE OF INSULIN (HCC): ICD-10-CM

## 2024-05-17 DIAGNOSIS — E11.65 TYPE 2 DIABETES MELLITUS WITH HYPERGLYCEMIA, WITH LONG-TERM CURRENT USE OF INSULIN (HCC): ICD-10-CM

## 2024-05-17 DIAGNOSIS — I10 PRIMARY HYPERTENSION: ICD-10-CM

## 2024-05-21 RX ORDER — EZETIMIBE 10 MG/1
10 TABLET ORAL DAILY
Qty: 90 TABLET | Refills: 0 | Status: SHIPPED | OUTPATIENT
Start: 2024-05-21

## 2024-05-21 RX ORDER — LOSARTAN POTASSIUM 100 MG/1
100 TABLET ORAL DAILY
Qty: 90 TABLET | Refills: 0 | Status: SHIPPED | OUTPATIENT
Start: 2024-05-21

## 2024-05-21 RX ORDER — CLOPIDOGREL BISULFATE 75 MG/1
75 TABLET ORAL DAILY
Qty: 90 TABLET | Refills: 0 | Status: SHIPPED | OUTPATIENT
Start: 2024-05-21

## 2024-05-21 RX ORDER — ATORVASTATIN CALCIUM 40 MG/1
40 TABLET, FILM COATED ORAL DAILY
Qty: 90 TABLET | Refills: 0 | Status: SHIPPED | OUTPATIENT
Start: 2024-05-21

## 2024-05-21 RX ORDER — AMLODIPINE BESYLATE 5 MG/1
5 TABLET ORAL DAILY
Qty: 90 TABLET | Refills: 0 | Status: SHIPPED | OUTPATIENT
Start: 2024-05-21

## 2024-05-21 NOTE — TELEPHONE ENCOUNTER
Called Patient to schedule his follow-up appt per Dr. Garcia. Patient stated that he will call back once he is ready.

## 2024-05-21 NOTE — TELEPHONE ENCOUNTER
Please review. Protocol Failed; No Protocol    Requested Prescriptions   Pending Prescriptions Disp Refills    empagliflozin (JARDIANCE) 10 MG Oral Tab 90 tablet 0     Sig: Take 1 tablet (10 mg total) by mouth daily.       Diabetes Medication Protocol Failed - 5/17/2024  6:19 PM        Failed - Last A1C < 7.5 and within past 6 months     Lab Results   Component Value Date    A1C 8.1 (H) 12/19/2023             Failed - EGFRCR or GFRNAA > 50     GFR Evaluation            Failed - GFR in the past 12 months        Passed - In person appointment or virtual visit in the past 6 mos or appointment in next 3 mos     Recent Outpatient Visits              2 months ago Preop examination    [facility] [address] [place], [place] [address]Alma Rosa Michael, MD    Office Visit    4 months ago Type 2 diabetes mellitus with hyperglycemia, with long-term current use of insulin ([facility])    [facility] [address] [place], [place] [address]Alma Rosa Michael, MD    Office Visit    9 months ago Type 2 diabetes mellitus with hyperglycemia, with long-term current use of insulin ([facility])    [facility] [address] [place], [place] [address]Alma Rosa Cheryl Ann, APRN    Office Visit    1 year ago Coronary artery disease involving autologous vein coronary bypass graft without angina pectoris    [facility] [address] [place], [place] [address]Alma Rosa Michael, MD    Office Visit    1 year ago Primary hypertension    [facility] Fatmata Chatman MD    Office Visit                      Passed - Microalbumin procedure in past 12 months or taking ACE/ARB          amLODIPine 5 MG Oral Tab 90 tablet 0     Sig: Take 1 tablet (5 mg total) by mouth daily.       Hypertension Medications Protocol Failed - 5/17/2024  6:19 PM        Failed - CMP or BMP in past 12 months        Failed - Last BP reading less than 140/90     BP Readings from Last 1 Encounters:   03/19/24 160/90               Failed - EGFRCR or GFRNAA > 50     GFR Evaluation             Passed - In person appointment or virtual visit in the past 12 mos or appointment in next 3 mos     Recent Outpatient Visits              2 months ago Preop examination    [facility] [address] [place], [place] [address]Alma Rosa Michael, MD    Office Visit    4 months ago Type 2 diabetes mellitus with hyperglycemia, with long-term current use of insulin ([facility])    [facility] [address] [place], [place] [address]Alma Rosa Michael, MD    Office Visit    9 months ago Type 2 diabetes mellitus with hyperglycemia, with long-term current use of insulin ([facility])    [facility] [address] [place], [place] [address]Alma Rosa Cheryl Ann, APRN    Office Visit    1 year ago Coronary artery disease involving autologous vein coronary bypass graft without angina pectoris    [facility] [address] [place], [place] [address]Alma Rosa Michael, MD    Office Visit    1 year ago Primary hypertension    [facility] Fatmata Chatman MD    Office Visit                        atorvastatin 40 MG Oral Tab 90 tablet 0     Sig: Take 1 tablet (40 mg total) by mouth daily.       Cholesterol Medication Protocol Failed - 5/17/2024  6:19 PM        Failed - ALT < 80     Lab Results   Component Value Date    ALT 23 02/13/2023             Failed - ALT resulted within past year        Failed - Lipid panel within past 12 months     Lab Results   Component Value Date    CHOLEST 216 (H) 02/13/2023    TRIG 128 02/13/2023    HDL 69 02/13/2023     (H) 02/13/2023    VLDL 20 08/06/2014    TCHDLRATIO 3.1 02/13/2023    NONHDLC 147 (H) 02/13/2023             Passed - In person appointment or virtual visit in the past 12 mos or appointment in next 3 mos     Recent Outpatient Visits              2 months ago Preop examination    [facility] [address] [place], [place] [address]Alma Rosa Michael, MD    Office Visit    4 months ago Type 2 diabetes mellitus with hyperglycemia, with long-term current use of insulin ([facility])    [facility]  [address] MD    Office Visit    9 months ago Type 2 diabetes mellitus with hyperglycemia, with long-term current use of insulin ([facility])    [address] Libertad Guillen APRN    Office Visit    1 year ago Coronary artery disease involving autologous vein coronary bypass graft without angina pectoris    [address]    Office Visit    1 year ago Primary hypertension    [facility] Fatmata Chatman MD    Office Visit                        clopidogrel 75 MG Oral Tab 90 tablet 0     Sig: Take 1 tablet (75 mg total) by mouth daily.       There is no refill protocol information for this order       ezetimibe 10 MG Oral Tab 90 tablet 0     Sig: Take 1 tablet (10 mg total) by mouth daily.       Cholesterol Medication Protocol Failed - 5/17/2024  6:19 PM        Failed - ALT < 80     Lab Results   Component Value Date    ALT 23 02/13/2023             Failed - ALT resulted within past year        Failed - Lipid panel within past 12 months     Lab Results   Component Value Date    CHOLEST 216 (H) 02/13/2023    TRIG 128 02/13/2023    HDL 69 02/13/2023     (H) 02/13/2023    VLDL 20 08/06/2014    TCHDLRATIO 3.1 02/13/2023    NONHDLC 147 (H) 02/13/2023             Passed - In person appointment or virtual visit in the past 12 mos or appointment in next 3 mos     Recent Outpatient Visits              2 months ago Preop examination    [address]    Office Visit    4 months ago Type 2 diabetes mellitus with hyperglycemia, with long-term current use of insulin ([facility])    [address] Say Nayak MD    Office Visit    9 months ago Type 2 diabetes mellitus with hyperglycemia, with long-term current use of insulin ([facility])    [address]  Libert[address] Alfonso APRN    Office Visit    1 year ago Coronary artery disease involving autologous vein coronary bypass graft without angina pectoris    [facility] [address] [place], [place] [address]Alma Rosa Michael, MD    Office Visit    1 year ago Primary hypertension    [facility] Fatmata Chatman MD    Office Visit                        losartan 100 MG Oral Tab 90 tablet 0     Sig: Take 1 tablet (100 mg total) by mouth daily.       Hypertension Medications Protocol Failed - 5/17/2024  6:19 PM        Failed - CMP or BMP in past 12 months        Failed - Last BP reading less than 140/90     BP Readings from Last 1 Encounters:   03/19/24 160/90               Failed - EGFRCR or GFRNAA > 50     GFR Evaluation            Passed - In person appointment or virtual visit in the past 12 mos or appointment in next 3 mos     Recent Outpatient Visits              2 months ago Preop examination    [facility] [address] [place], [place] [address]Alma Rosa Michael, MD    Office Visit    4 months ago Type 2 diabetes mellitus with hyperglycemia, with long-term current use of insulin ([facility])    [facility] Alma Rosa Sy Michael, MD    Office Visit    9 months ago Type 2 diabetes mellitus with hyperglycemia, with long-term current use of insulin ([facility])    [facility] [address] [place], [place] [address]Alma Rosa Cheryl Ann, APRN    Office Visit    1 year ago Coronary artery disease involving autologous vein coronary bypass graft without angina pectoris    [facility] [address] [place], [place] [address]Alma Rosa Michael, MD    Office Visit    1 year ago Primary hypertension    [facility] Fatmata Chatman MD    Office Visit                               Recent Outpatient Visits              2 months ago Preop examination    [facility] [address] [place], [place] [address]Alma Rosa Michael, MD    Office Visit    4 months ago Type 2 diabetes mellitus with  hyperglycemia, with long-term current use of insulin ([facility])    [facility], [address] [place], [place] [address]Alma Rosa Michael, MD    Office Visit    9 months ago Type 2 diabetes mellitus with hyperglycemia, with long-term current use of insulin ([facility])    [facility], [address] [place], [place] [address]Alma Rosa Cheryl Ann, APRN    Office Visit    1 year ago Coronary artery disease involving autologous vein coronary bypass graft without angina pectoris    [facility], [address] [place], [place] [address]Alma Rosa Michael, MD    Office Visit    1 year ago Primary hypertension    [facility] Fatmata Chatman MD    Office Visit

## 2024-05-23 ENCOUNTER — PATIENT MESSAGE (OUTPATIENT)
Dept: INTERNAL MEDICINE CLINIC | Facility: CLINIC | Age: 64
End: 2024-05-23

## 2024-05-23 DIAGNOSIS — N18.30 STAGE 3 CHRONIC KIDNEY DISEASE DUE TO DIABETES MELLITUS (HCC): Primary | ICD-10-CM

## 2024-05-23 DIAGNOSIS — E11.22 STAGE 3 CHRONIC KIDNEY DISEASE DUE TO DIABETES MELLITUS (HCC): Primary | ICD-10-CM

## 2024-05-28 NOTE — TELEPHONE ENCOUNTER
From: Roldan Mast  To: Say Ravi  Sent: 5/23/2024 6:09 PM CDT  Subject: Kidney doctor    Can you pleaserefer me to a kidney doctor. The cardiologist requested this based on my lab results. I believe 2 years ago I saw Dr Sarah (male)

## 2024-06-06 ENCOUNTER — PATIENT MESSAGE (OUTPATIENT)
Dept: INTERNAL MEDICINE CLINIC | Facility: CLINIC | Age: 64
End: 2024-06-06

## 2024-06-07 NOTE — TELEPHONE ENCOUNTER
He may proceed with his tooth extraction, but his cardiologist needs to okay holding his asa/plavix prior. If unable to obtain Jardaince or Farxiga due to cost then we will need adjust his insulin regimen to obtain better control and needs to complete labs and come in for follow-up. Entresto was prescribed by his cardiologist.

## 2024-06-07 NOTE — TELEPHONE ENCOUNTER
Dr Ravi - Patient had follow up with cardiology.  Does he need to follow up again prior to dental procedure?    Patient unable to get coverage for Jardiance, Farxiga or Entresto.  Any other recommendations?

## 2024-06-07 NOTE — TELEPHONE ENCOUNTER
From: Roldan Mast  To: Say Ravi  Sent: 6/6/2024 12:56 PM CDT  Subject: Follow up dentist approval    I have two concerns  1.The medications are not affordable even with a savings card. Is there anything else I can do?  Jardiance  Farxiga  Entresto   2. I've done a follow up with the cardiologist and I have two pending followup with the kidney doctor and again the cardiologist august 5 and 6th. When can I get the ok from dr ravi to have my tooth pulled.it is causing pain and issues.

## 2024-06-11 NOTE — TELEPHONE ENCOUNTER
No form was received. Spoke with the patient to get the phone number of the dentist, so we can directly get the form from them. Patient stated that he is currently driving and will call back with the information.

## 2024-06-27 ENCOUNTER — OFFICE VISIT (OUTPATIENT)
Dept: NEPHROLOGY | Facility: CLINIC | Age: 64
End: 2024-06-27

## 2024-06-27 VITALS — SYSTOLIC BLOOD PRESSURE: 160 MMHG | WEIGHT: 153.5 LBS | BODY MASS INDEX: 26 KG/M2 | DIASTOLIC BLOOD PRESSURE: 98 MMHG

## 2024-06-27 DIAGNOSIS — E11.21 DIABETIC NEPHROPATHY ASSOCIATED WITH TYPE 2 DIABETES MELLITUS (HCC): ICD-10-CM

## 2024-06-27 DIAGNOSIS — I50.20 HFREF (HEART FAILURE WITH REDUCED EJECTION FRACTION) (HCC): ICD-10-CM

## 2024-06-27 DIAGNOSIS — N18.30 STAGE 3 CHRONIC KIDNEY DISEASE, UNSPECIFIED WHETHER STAGE 3A OR 3B CKD (HCC): Primary | ICD-10-CM

## 2024-06-27 PROCEDURE — 99215 OFFICE O/P EST HI 40 MIN: CPT | Performed by: INTERNAL MEDICINE

## 2024-06-27 NOTE — PROGRESS NOTES
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 No other acute insults noted; meds are otherwise benign without NSAIDs or PPI.  Denies obstructive symptoms.  No evidence of a systemic process such as vasculitis autoimmune disease.  Lab findings do not suggest a plasma cell dyscrasia. PLAN- d/w pt / wife / daughter at length. To focus on DM / BP / HF mgmt    2) DM 2 > 25 yrs with triopathy- A1C historically > 8     3) CAD s/p CABG 2006     4) Longstanding HTN- BP's 160/100. Continue amlodipine 5 mg daily + coreg 25 mg bid; resume losartan 100 mg daily + loop diuretic. May need clonidine vs TRUJILLO    5) Ischemic CM EF 45 -> 35% with recent onset edema / SHAFER / orthopnea; stress test without ischemia. PLAN- coreg / losartan / loop diuretic. Previously on SGLT2-I -> dc'ed due to cost    To check labs in 1-2 weeks. D/W family x 40 min.        HPI:   Roldan Mast is a 64 year old male who presents for follow-up of   Chief Complaint   Patient presents with    Chronic Kidney Disease     Ref'd back by DR. Ravi     Hypertension       Very pleasant 64-year-old male who I last saw in 2022 presents for follow-up of chronic kidney disease; please see above for further details.    HISTORY:  Past Medical History:    CAD (coronary artery disease)    Stents x 2 - once in 2006 after MI and prior to CABG and again in 2010.    CAD (coronary artery disease), autologous vein bypass graft    DM type 2, uncontrolled, with renal complications    Essential hypertension    Hyperlipidemia    Hyperlipidemia LDL goal < 70    Hypertension    Hypertension, renal disease    Hypertensive heart/renal disease with failure    S/P CABG x 5    CABG x 5 in 2006    Systolic CHF (HCC)    Type 1 diabetes mellitus (HCC)      Past Surgical History:   Procedure Laterality Date    Angioplasty (coronary)  2006     stent x2    Angioplasty (coronary)  2010    stent x1    Cabg  2006    CABG x 5    Other surgical history  2007    Bilateral lens replacement    Other surgical history  2009    Lasik bilaterally    Tonsillectomy  At age 11      Family History   Problem Relation Age of Onset    Diabetes Mother         Type 2 DM on oral agents    Thyroid Disorder Mother         Thyroid history - not able to elaborate    Other (Other) Mother         See thyroid tab    Heart Disorder Maternal Grandmother         CAD    Diabetes Paternal Grandmother         Type 2 DM    Diabetes Brother         Type 2 DM on oral agents    Diabetes Daughter         Type 2 DM on oral agents    Diabetes Daughter         Type 2 DM on oral agents    Thyroid Disorder Daughter         Thyroid history - unable to elaborate    Other (Other) Daughter         See thyroid tab    Diabetes Brother         Type 2 DM on oral agents    Eye Problems Neg       Social History:   Social History     Socioeconomic History    Marital status:     Number of children: 4   Occupational History    Occupation: Auto repair   Tobacco Use    Smoking status: Never    Smokeless tobacco: Never   Vaping Use    Vaping status: Never Used   Substance and Sexual Activity    Alcohol use: Not Currently     Comment: occ    Drug use: No    Sexual activity: Not Currently   Other Topics Concern    Caffeine Concern Yes     Comment: 2-3 cups of coffee weekly    Exercise No    Seat Belt Yes     Social Determinants of Health     Physical Activity: Inactive (7/20/2020)    [facility] [employer]    Exercise Vital Sign     Days of Exercise per Week: 0 days     Minutes of Exercise per Session: 0 min    [facility]        Medications (Active prior to today's visit):  Current Outpatient Medications   Medication Sig Dispense Refill    empagliflozin (JARDIANCE) 10 MG Oral Tab Take 1 tablet (10 mg total) by mouth daily. 90 tablet 0     amLODIPine 5 MG Oral Tab Take 1 tablet (5 mg total) by mouth daily. 90 tablet 0    atorvastatin 40 MG Oral Tab Take 1 tablet (40 mg total) by mouth daily. 90 tablet 0    clopidogrel 75 MG Oral Tab Take 1 tablet (75 mg total) by mouth daily. 90 tablet 0    ezetimibe 10 MG Oral Tab Take 1 tablet (10 mg total) by mouth daily. 90 tablet 0    losartan 100 MG Oral Tab Take 1 tablet (100 mg total) by mouth daily. 90 tablet 0    aspirin 325 MG Oral Tab Take 1 tablet daily. 90 tabs 3 refills 90 tablet 0    carvedilol 25 MG Oral Tab Take 1 tablet (25 mg total) by mouth in the morning and 1 tablet (25 mg total) before bedtime. 180 tablet 0    insulin glargine (BASAGLAR KWIKPEN) 100 UNIT/ML Subcutaneous Solution Pen-injector 28 units BID (Patient not taking: Reported on 3/19/2024) 18 each 0    Insulin Lispro, 1 Unit Dial, (HUMALOG KWIKPEN) 100 UNIT/ML Subcutaneous Solution Pen-injector 10- 15 units  in divided doses twice daily 15 mL 0    azithromycin (ZITHROMAX Z-KENAN) 250 MG Oral Tab 500 mg once followed by 250 mg daily x 4 days 6 tablet 0    Continuous Blood Gluc Sensor (FREESTYLE CHARLEE 2 SENSOR) Does not apply Misc 1 each every 14 (fourteen) days. 2 each 11    Alcohol Swabs (BD SWAB SINGLE USE REGULAR) Does not apply Pads       Cholecalciferol (VITAMIN D) 1000 UNITS Oral Tab Take 1 tablet by mouth 2 (two) times daily. 180 tablet 2       Allergies:  Allergies   Allergen Reactions    Adenosine     Spironolactone        ROS:     Denies fever/chills  Denies wt loss/gain  Denies HA or visual changes  Denies CP or palpitations  Denies SOB/cough/hemoptysis  Denies abd or flank pain  Denies N/V/D  Denies change in urinary habits or gross hematuria  Denies LE edema  Denies skin rashes/myalgias/arthralgias      PHYSICAL EXAM:   Wt 153 lb 8 oz (69.6 kg)   BMI 26.35 kg/m²   Wt Readings from Last 3 Encounters:   06/27/24 153 lb 8 oz (69.6 kg)   03/19/24 154 lb 12.8 oz (70.2 kg)   01/16/24 159 lb (72.1 kg)     General: Alert and oriented  in no apparent distress.  HEENT: No scleral icterus, MMM  Neck: Supple, no EBONY or thyromegaly  Cardiac: Regular rate and rhythm, S1, S2 normal, no murmur or rub  Lungs: Clear without wheezes, rales, rhonchi.    Abdomen: Soft, non-tender. + bowel sounds, no palpable organomegaly  Extremities: Without clubbing, cyanosis or edema.  Neurologic: Alert and oriented, normal affect, cranial nerves grossly intact, moving all extremities  Skin: Warm and dry, no rashes      Luiza Sarah MD  6/27/2024  1:36 PM

## 2024-08-26 ENCOUNTER — TELEPHONE (OUTPATIENT)
Dept: INTERNAL MEDICINE CLINIC | Facility: CLINIC | Age: 64
End: 2024-08-26

## 2024-08-26 NOTE — TELEPHONE ENCOUNTER
Dr. Vieyra called requesting last OV notes be faxed to them at [phone]   Goal Outcome Evaluation:  Plan of Care Reviewed With: patient        Progress: no change  Outcome Evaluation: Still c/o pain to right knee.  Purewick in use-refuses to turn at times.  Jaundice.  Pt asking why only her mother is allowed to visit.  i do not see any notes entered, but that is the info I have been given in report.  Bed alarm on for safety.

## 2024-08-28 NOTE — TELEPHONE ENCOUNTER
Well-controlled, continue current medications          LM and sent [facility] to get scheduled

## 2024-10-02 RX ORDER — ATORVASTATIN CALCIUM 40 MG/1
40 TABLET, FILM COATED ORAL DAILY
Qty: 30 TABLET | Refills: 0 | Status: SHIPPED | OUTPATIENT
Start: 2024-10-02

## 2024-10-02 RX ORDER — CARVEDILOL 25 MG/1
25 TABLET ORAL 2 TIMES DAILY
Qty: 60 TABLET | Refills: 0 | Status: ON HOLD | OUTPATIENT
Start: 2024-10-02 | End: 2024-10-05

## 2024-10-02 RX ORDER — LOSARTAN POTASSIUM 100 MG/1
100 TABLET ORAL DAILY
Qty: 30 TABLET | Refills: 0 | Status: SHIPPED | OUTPATIENT
Start: 2024-10-02 | End: 2024-10-09

## 2024-10-02 RX ORDER — EZETIMIBE 10 MG/1
10 TABLET ORAL DAILY
Qty: 30 TABLET | Refills: 0 | Status: SHIPPED | OUTPATIENT
Start: 2024-10-02

## 2024-10-02 RX ORDER — CLOPIDOGREL BISULFATE 75 MG/1
75 TABLET ORAL DAILY
Qty: 30 TABLET | Refills: 0 | Status: SHIPPED | OUTPATIENT
Start: 2024-10-02

## 2024-10-02 NOTE — TELEPHONE ENCOUNTER
Please review; protocol failed/ has no protocol    Patient completed labs at [facility] on 05/20/2024 for Cmp and Lipid panel     Requested Prescriptions   Pending Prescriptions Disp Refills    CARVEDILOL 25 MG Oral Tab [Pharmacy Med Name: Carvedilol 25 MG Oral Tablet] 180 tablet 0     Sig: TAKE 1 TABLET BY MOUTH IN THE MORNING AND  1  TABLET  BEFORE  BEDTIME.       Hypertension Medications Protocol Failed - 10/2/2024  4:04 PM        Failed - CMP or BMP in past 12 months        Failed - Last BP reading less than 140/90     BP Readings from Last 1 Encounters:   06/27/24 (!) 160/98               Failed - EGFRCR or GFRNAA > 50     GFR Evaluation            Passed - In person appointment or virtual visit in the past 12 mos or appointment in next 3 mos     Recent Outpatient Visits              3 months ago Stage 3 chronic kidney disease, unspecified whether stage 3a or 3b CKD ([facility])    [facility] Luiza Sarah MD    Office Visit    6 months ago Preop examination    [address] Say Nayak MD    Office Visit    8 months ago Type 2 diabetes mellitus with hyperglycemia, with long-term current use of insulin ([facility])    [address] Say Nayak MD    Office Visit    1 year ago Type 2 diabetes mellitus with hyperglycemia, with long-term current use of insulin ([facility])    [address] Libertad Guillen APRN    Office Visit    1 year ago Coronary artery disease involving autologous vein coronary bypass graft without angina pectoris    [address] Say Nayak MD    Office Visit                        EZETIMIBE 10 MG Oral Tab [Pharmacy Med Name: Ezetimibe 10 MG Oral Tablet] 90 tablet 0     Sig: Take 1 tablet by mouth once daily       Cholesterol Medication Protocol Failed - 10/2/2024  4:04 PM        Failed - ALT < 80     Lab  Results   Component Value Date    ALT 23 02/13/2023             Failed - ALT resulted within past year        Failed - Lipid panel within past 12 months     Lab Results   Component Value Date    CHOLEST 216 (H) 02/13/2023    TRIG 128 02/13/2023    HDL 69 02/13/2023     (H) 02/13/2023    VLDL 20 08/06/2014    TCHDLRATIO 3.1 02/13/2023    NONHDLC 147 (H) 02/13/2023             Passed - In person appointment or virtual visit in the past 12 mos or appointment in next 3 mos     Recent Outpatient Visits              3 months ago Stage 3 chronic kidney disease, unspecified whether stage 3a or 3b CKD ([facility])    [facility] Luiza Sarah MD    Office Visit    6 months ago Preop examination    [address] MD    Office Visit    8 months ago Type 2 diabetes mellitus with hyperglycemia, with long-term current use of insulin ([facility])    [address] MD    Office Visit    1 year ago Type 2 diabetes mellitus with hyperglycemia, with long-term current use of insulin ([facility])    [address] Cher[address] Ann, APRN    Office Visit    1 year ago Coronary artery disease involving autologous vein coronary bypass graft without angina pectoris    [address], MD    Office Visit                        LOSARTAN 100 MG Oral Tab [Pharmacy Med Name: Losartan Potassium 100 MG Oral Tablet] 90 tablet 0     Sig: Take 1 tablet by mouth once daily       Hypertension Medications Protocol Failed - 9/30/2024 11:04 AM        Failed - CMP or BMP in past 12 months        Failed - Last BP reading less than 140/90     BP Readings from Last 1 Encounters:   06/27/24 (!) 160/98               Failed - EGFRCR or GFRNAA > 50     GFR Evaluation            Passed - In person appointment or virtual visit in the past 12 mos or  appointment in next 3 mos     Recent Outpatient Visits              3 months ago Stage 3 chronic kidney disease, unspecified whether stage 3a or 3b CKD ([facility])    [facility] Luiza Sarah MD    Office Visit    6 months ago Preop examination    [address] Say Nayak MD    Office Visit    8 months ago Type 2 diabetes mellitus with hyperglycemia, with long-term current use of insulin ([facility])    [address] Say Nayak MD    Office Visit    1 year ago Type 2 diabetes mellitus with hyperglycemia, with long-term current use of insulin ([facility])    [address]    Office Visit    1 year ago Coronary artery disease involving autologous vein coronary bypass graft without angina pectoris    [address] Say Nayak MD    Office Visit                        CLOPIDOGREL 75 MG Oral Tab [Pharmacy Med Name: Clopidogrel Bisulfate 75 MG Oral Tablet] 90 tablet 0     Sig: Take 1 tablet by mouth once daily       There is no refill protocol information for this order       ATORVASTATIN 40 MG Oral Tab [Pharmacy Med Name: Atorvastatin Calcium 40 MG Oral Tablet] 90 tablet 0     Sig: Take 1 tablet by mouth once daily       Cholesterol Medication Protocol Failed - 10/2/2024  4:04 PM        Failed - ALT < 80     Lab Results   Component Value Date    ALT 23 02/13/2023             Failed - ALT resulted within past year        Failed - Lipid panel within past 12 months     Lab Results   Component Value Date    CHOLEST 216 (H) 02/13/2023    TRIG 128 02/13/2023    HDL 69 02/13/2023     (H) 02/13/2023    VLDL 20 08/06/2014    TCHDLRATIO 3.1 02/13/2023    NONHDLC 147 (H) 02/13/2023             Passed - In person appointment or virtual visit in the past 12 mos or appointment in next 3 mos     Recent Outpatient Visits               3 months ago Stage 3 chronic kidney disease, unspecified whether stage 3a or 3b CKD ([facility])    [facility] Nephrology Luiza Sarah MD    Office Visit    6 months ago Preop examination    [address] Say Nayak MD    Office Visit    8 months ago Type 2 diabetes mellitus with hyperglycemia, with long-term current use of insulin ([facility])    [address] Say Nayak MD    Office Visit    1 year ago Type 2 diabetes mellitus with hyperglycemia, with long-term current use of insulin ([facility])    [address] Mad[address]ha, APRN    Office Visit    1 year ago Coronary artery disease involving autologous vein coronary bypass graft without angina pectoris    [address] MD    Office Visit                         Recent Outpatient Visits              3 months ago Stage 3 chronic kidney disease, unspecified whether stage 3a or 3b CKD ([facility])    [facility] Luiza Sarah MD    Office Visit    6 months ago Preop examination    [address] Say Nayak MD    Office Visit    8 months ago Type 2 diabetes mellitus with hyperglycemia, with long-term current use of insulin ([facility])    [address] Say Nayak MD    Office Visit    1 year ago Type 2 diabetes mellitus with hyperglycemia, with long-term current use of insulin ([facility])    [address], APRN    Office Visit    1 year ago Coronary artery disease involving autologous vein coronary bypass graft without angina pectoris    [address] Say Nayak MD    Office Visit

## 2024-10-03 ENCOUNTER — PATIENT MESSAGE (OUTPATIENT)
Dept: INTERNAL MEDICINE CLINIC | Facility: CLINIC | Age: 64
End: 2024-10-03

## 2024-10-03 DIAGNOSIS — N18.4 CKD (CHRONIC KIDNEY DISEASE) STAGE 4, GFR 15-29 ML/MIN (HCC): Primary | ICD-10-CM

## 2024-10-03 DIAGNOSIS — D63.1 ANEMIA DUE TO STAGE 4 CHRONIC KIDNEY DISEASE (HCC): ICD-10-CM

## 2024-10-03 DIAGNOSIS — N18.4 ANEMIA DUE TO STAGE 4 CHRONIC KIDNEY DISEASE (HCC): ICD-10-CM

## 2024-10-05 ENCOUNTER — APPOINTMENT (OUTPATIENT)
Dept: CT IMAGING | Facility: HOSPITAL | Age: 64
End: 2024-10-05
Attending: EMERGENCY MEDICINE
Payer: MEDICAID

## 2024-10-05 ENCOUNTER — HOSPITAL ENCOUNTER (INPATIENT)
Facility: HOSPITAL | Age: 64
LOS: 4 days | Discharge: HOME | End: 2024-10-09
Attending: EMERGENCY MEDICINE | Admitting: INTERNAL MEDICINE
Payer: MEDICAID

## 2024-10-05 ENCOUNTER — HOSPITAL ENCOUNTER (INPATIENT)
Facility: HOSPITAL | Age: 64
End: 2024-10-05
Attending: EMERGENCY MEDICINE | Admitting: INTERNAL MEDICINE
Payer: MEDICAID

## 2024-10-05 ENCOUNTER — APPOINTMENT (OUTPATIENT)
Dept: GENERAL RADIOLOGY | Facility: HOSPITAL | Age: 64
End: 2024-10-05
Attending: EMERGENCY MEDICINE
Payer: MEDICAID

## 2024-10-05 ENCOUNTER — APPOINTMENT (OUTPATIENT)
Dept: NUCLEAR MEDICINE | Facility: HOSPITAL | Age: 64
End: 2024-10-05
Attending: EMERGENCY MEDICINE
Payer: MEDICAID

## 2024-10-05 ENCOUNTER — HOSPITAL ENCOUNTER (OUTPATIENT)
Age: 64
Discharge: ANOTHER HOSPITAL | End: 2024-10-05
Payer: MEDICAID

## 2024-10-05 VITALS
TEMPERATURE: 98 F | HEIGHT: 65 IN | DIASTOLIC BLOOD PRESSURE: 99 MMHG | BODY MASS INDEX: 26.66 KG/M2 | HEART RATE: 84 BPM | RESPIRATION RATE: 16 BRPM | WEIGHT: 160 LBS | SYSTOLIC BLOOD PRESSURE: 176 MMHG | OXYGEN SATURATION: 95 %

## 2024-10-05 DIAGNOSIS — N17.9 AKI (ACUTE KIDNEY INJURY) (HCC): ICD-10-CM

## 2024-10-05 DIAGNOSIS — R06.00 DYSPNEA, UNSPECIFIED TYPE: ICD-10-CM

## 2024-10-05 DIAGNOSIS — R07.9 LEFT-SIDED CHEST PAIN: Primary | ICD-10-CM

## 2024-10-05 DIAGNOSIS — R07.89 CHEST PRESSURE: ICD-10-CM

## 2024-10-05 DIAGNOSIS — I16.0 HYPERTENSIVE URGENCY: ICD-10-CM

## 2024-10-05 DIAGNOSIS — J90 BILATERAL PLEURAL EFFUSION: ICD-10-CM

## 2024-10-05 DIAGNOSIS — I50.9 ACUTE ON CHRONIC CONGESTIVE HEART FAILURE, UNSPECIFIED HEART FAILURE TYPE (HCC): Primary | ICD-10-CM

## 2024-10-05 PROBLEM — N18.9 ACUTE KIDNEY INJURY SUPERIMPOSED ON CKD (HCC): Status: ACTIVE | Noted: 2024-10-05

## 2024-10-05 PROBLEM — N18.9 ACUTE KIDNEY INJURY SUPERIMPOSED ON CKD: Status: ACTIVE | Noted: 2024-10-05

## 2024-10-05 PROBLEM — E87.70 FLUID OVERLOAD: Status: ACTIVE | Noted: 2024-10-05

## 2024-10-05 LAB
ALBUMIN SERPL-MCNC: 4.1 G/DL (ref 3.2–4.8)
ALBUMIN/GLOB SERPL: 1.3 {RATIO} (ref 1–2)
ALP LIVER SERPL-CCNC: 154 U/L
ALT SERPL-CCNC: 22 U/L
ANION GAP SERPL CALC-SCNC: 6 MMOL/L (ref 0–18)
AST SERPL-CCNC: 15 U/L (ref ?–34)
BASOPHILS # BLD AUTO: 0.05 X10(3) UL (ref 0–0.2)
BASOPHILS NFR BLD AUTO: 0.5 %
BILIRUB SERPL-MCNC: 0.4 MG/DL (ref 0.2–1.1)
BILIRUB UR QL STRIP.AUTO: NEGATIVE
BUN BLD-MCNC: 52 MG/DL (ref 9–23)
CALCIUM BLD-MCNC: 9.2 MG/DL (ref 8.7–10.4)
CHLORIDE SERPL-SCNC: 104 MMOL/L (ref 98–112)
CLARITY UR REFRACT.AUTO: CLEAR
CO2 SERPL-SCNC: 24 MMOL/L (ref 21–32)
COLOR UR AUTO: COLORLESS
CREAT BLD-MCNC: 3.33 MG/DL
D DIMER PPP FEU-MCNC: 1.54 UG/ML FEU (ref ?–0.62)
EGFRCR SERPLBLD CKD-EPI 2021: 20 ML/MIN/1.73M2 (ref 60–?)
EOSINOPHIL # BLD AUTO: 0.16 X10(3) UL (ref 0–0.7)
EOSINOPHIL NFR BLD AUTO: 1.6 %
ERYTHROCYTE [DISTWIDTH] IN BLOOD BY AUTOMATED COUNT: 12.7 %
EST. AVERAGE GLUCOSE BLD GHB EST-MCNC: 166 MG/DL (ref 68–126)
FLUAV + FLUBV RNA SPEC NAA+PROBE: NEGATIVE
FLUAV + FLUBV RNA SPEC NAA+PROBE: NEGATIVE
GLOBULIN PLAS-MCNC: 3.1 G/DL (ref 2–3.5)
GLUCOSE BLD-MCNC: 134 MG/DL (ref 70–99)
GLUCOSE BLD-MCNC: 172 MG/DL (ref 70–99)
GLUCOSE BLD-MCNC: 218 MG/DL (ref 70–99)
GLUCOSE UR STRIP.AUTO-MCNC: NORMAL MG/DL
HBA1C MFR BLD: 7.4 % (ref ?–5.7)
HCT VFR BLD AUTO: 33.6 %
HGB BLD-MCNC: 11.7 G/DL
IMM GRANULOCYTES # BLD AUTO: 0.03 X10(3) UL (ref 0–1)
IMM GRANULOCYTES NFR BLD: 0.3 %
KETONES UR STRIP.AUTO-MCNC: NEGATIVE MG/DL
LEUKOCYTE ESTERASE UR QL STRIP.AUTO: NEGATIVE
LYMPHOCYTES # BLD AUTO: 2.14 X10(3) UL (ref 1–4)
LYMPHOCYTES NFR BLD AUTO: 21.6 %
MCH RBC QN AUTO: 28.7 PG (ref 26–34)
MCHC RBC AUTO-ENTMCNC: 34.8 G/DL (ref 31–37)
MCV RBC AUTO: 82.4 FL
MONOCYTES # BLD AUTO: 0.94 X10(3) UL (ref 0.1–1)
MONOCYTES NFR BLD AUTO: 9.5 %
NEUTROPHILS # BLD AUTO: 6.59 X10 (3) UL (ref 1.5–7.7)
NEUTROPHILS # BLD AUTO: 6.59 X10(3) UL (ref 1.5–7.7)
NEUTROPHILS NFR BLD AUTO: 66.5 %
NITRITE UR QL STRIP.AUTO: NEGATIVE
NT-PROBNP SERPL-MCNC: 8393 PG/ML (ref ?–125)
OSMOLALITY SERPL CALC.SUM OF ELEC: 299 MOSM/KG (ref 275–295)
PH UR STRIP.AUTO: 6 [PH] (ref 5–8)
PLATELET # BLD AUTO: 237 10(3)UL (ref 150–450)
POTASSIUM SERPL-SCNC: 5.1 MMOL/L (ref 3.5–5.1)
PROT SERPL-MCNC: 7.2 G/DL (ref 5.7–8.2)
PROT UR STRIP.AUTO-MCNC: 50 MG/DL
RBC # BLD AUTO: 4.08 X10(6)UL
RSV RNA SPEC NAA+PROBE: NEGATIVE
SARS-COV-2 RNA RESP QL NAA+PROBE: NOT DETECTED
SODIUM SERPL-SCNC: 134 MMOL/L (ref 136–145)
SP GR UR STRIP.AUTO: 1.01 (ref 1–1.03)
TROPONIN I SERPL HS-MCNC: 14 NG/L
TROPONIN I SERPL HS-MCNC: 14 NG/L
URATE SERPL-MCNC: 6.4 MG/DL
UROBILINOGEN UR STRIP.AUTO-MCNC: NORMAL MG/DL
WBC # BLD AUTO: 9.9 X10(3) UL (ref 4–11)

## 2024-10-05 PROCEDURE — 71250 CT THORAX DX C-: CPT | Performed by: EMERGENCY MEDICINE

## 2024-10-05 PROCEDURE — 93005 ELECTROCARDIOGRAM TRACING: CPT

## 2024-10-05 PROCEDURE — 99214 OFFICE O/P EST MOD 30 MIN: CPT

## 2024-10-05 PROCEDURE — 78582 LUNG VENTILAT&PERFUS IMAGING: CPT | Performed by: EMERGENCY MEDICINE

## 2024-10-05 PROCEDURE — 93010 ELECTROCARDIOGRAM REPORT: CPT

## 2024-10-05 PROCEDURE — 99223 1ST HOSP IP/OBS HIGH 75: CPT | Performed by: INTERNAL MEDICINE

## 2024-10-05 PROCEDURE — 71045 X-RAY EXAM CHEST 1 VIEW: CPT | Performed by: EMERGENCY MEDICINE

## 2024-10-05 RX ORDER — CLOPIDOGREL BISULFATE 75 MG/1
75 TABLET ORAL NIGHTLY
Status: DISCONTINUED | OUTPATIENT
Start: 2024-10-05 | End: 2024-10-09

## 2024-10-05 RX ORDER — AMLODIPINE BESYLATE 5 MG/1
5 TABLET ORAL DAILY
Status: DISCONTINUED | OUTPATIENT
Start: 2024-10-06 | End: 2024-10-08

## 2024-10-05 RX ORDER — FUROSEMIDE 10 MG/ML
40 INJECTION INTRAMUSCULAR; INTRAVENOUS ONCE
Status: DISCONTINUED | OUTPATIENT
Start: 2024-10-05 | End: 2024-10-05

## 2024-10-05 RX ORDER — HEPARIN SODIUM 5000 [USP'U]/ML
5000 INJECTION, SOLUTION INTRAVENOUS; SUBCUTANEOUS EVERY 12 HOURS SCHEDULED
Status: DISCONTINUED | OUTPATIENT
Start: 2024-10-05 | End: 2024-10-09

## 2024-10-05 RX ORDER — MELATONIN
3 NIGHTLY PRN
Status: DISCONTINUED | OUTPATIENT
Start: 2024-10-05 | End: 2024-10-09

## 2024-10-05 RX ORDER — AMLODIPINE BESYLATE 5 MG/1
5 TABLET ORAL ONCE
Status: COMPLETED | OUTPATIENT
Start: 2024-10-05 | End: 2024-10-05

## 2024-10-05 RX ORDER — ATORVASTATIN CALCIUM 40 MG/1
40 TABLET, FILM COATED ORAL NIGHTLY
Status: DISCONTINUED | OUTPATIENT
Start: 2024-10-05 | End: 2024-10-09

## 2024-10-05 RX ORDER — HYDRALAZINE HYDROCHLORIDE 20 MG/ML
5 INJECTION INTRAMUSCULAR; INTRAVENOUS EVERY 4 HOURS PRN
Status: DISCONTINUED | OUTPATIENT
Start: 2024-10-05 | End: 2024-10-09

## 2024-10-05 RX ORDER — DEXTROSE MONOHYDRATE 25 G/50ML
50 INJECTION, SOLUTION INTRAVENOUS
Status: DISCONTINUED | OUTPATIENT
Start: 2024-10-05 | End: 2024-10-09

## 2024-10-05 RX ORDER — CARVEDILOL 12.5 MG/1
25 TABLET ORAL NIGHTLY
Status: DISCONTINUED | OUTPATIENT
Start: 2024-10-05 | End: 2024-10-06

## 2024-10-05 RX ORDER — LOSARTAN POTASSIUM 100 MG/1
100 TABLET ORAL DAILY
Status: DISCONTINUED | OUTPATIENT
Start: 2024-10-05 | End: 2024-10-05

## 2024-10-05 RX ORDER — ASPIRIN 325 MG
325 TABLET ORAL NIGHTLY
COMMUNITY

## 2024-10-05 RX ORDER — ASPIRIN 81 MG/1
324 TABLET, CHEWABLE ORAL ONCE
Status: COMPLETED | OUTPATIENT
Start: 2024-10-05 | End: 2024-10-05

## 2024-10-05 RX ORDER — CARVEDILOL 25 MG/1
25 TABLET ORAL NIGHTLY
COMMUNITY
End: 2024-10-09

## 2024-10-05 RX ORDER — EZETIMIBE 10 MG/1
10 TABLET ORAL NIGHTLY
Status: DISCONTINUED | OUTPATIENT
Start: 2024-10-05 | End: 2024-10-09

## 2024-10-05 RX ORDER — ASPIRIN 325 MG
325 TABLET ORAL NIGHTLY
Status: DISCONTINUED | OUTPATIENT
Start: 2024-10-05 | End: 2024-10-09

## 2024-10-05 RX ORDER — LABETALOL HYDROCHLORIDE 5 MG/ML
5 INJECTION, SOLUTION INTRAVENOUS ONCE
Status: COMPLETED | OUTPATIENT
Start: 2024-10-05 | End: 2024-10-05

## 2024-10-05 RX ORDER — LOSARTAN POTASSIUM 100 MG/1
100 TABLET ORAL NIGHTLY
Status: DISCONTINUED | OUTPATIENT
Start: 2024-10-05 | End: 2024-10-06

## 2024-10-05 RX ORDER — FUROSEMIDE 10 MG/ML
20 INJECTION INTRAMUSCULAR; INTRAVENOUS ONCE
Status: COMPLETED | OUTPATIENT
Start: 2024-10-05 | End: 2024-10-05

## 2024-10-05 RX ORDER — AMLODIPINE BESYLATE 5 MG/1
5 TABLET ORAL DAILY
Status: DISCONTINUED | OUTPATIENT
Start: 2024-10-05 | End: 2024-10-05

## 2024-10-05 RX ORDER — ONDANSETRON 2 MG/ML
4 INJECTION INTRAMUSCULAR; INTRAVENOUS EVERY 6 HOURS PRN
Status: DISCONTINUED | OUTPATIENT
Start: 2024-10-05 | End: 2024-10-09

## 2024-10-05 RX ORDER — ACETAMINOPHEN 500 MG
500 TABLET ORAL EVERY 4 HOURS PRN
Status: DISCONTINUED | OUTPATIENT
Start: 2024-10-05 | End: 2024-10-09

## 2024-10-05 RX ORDER — NICOTINE POLACRILEX 4 MG
30 LOZENGE BUCCAL
Status: DISCONTINUED | OUTPATIENT
Start: 2024-10-05 | End: 2024-10-09

## 2024-10-05 RX ORDER — NICOTINE POLACRILEX 4 MG
15 LOZENGE BUCCAL
Status: DISCONTINUED | OUTPATIENT
Start: 2024-10-05 | End: 2024-10-09

## 2024-10-05 RX ORDER — INSULIN DEGLUDEC 100 U/ML
28 INJECTION, SOLUTION SUBCUTANEOUS NIGHTLY
Status: DISCONTINUED | OUTPATIENT
Start: 2024-10-05 | End: 2024-10-06

## 2024-10-05 NOTE — PROGRESS NOTES
10/05/24 1705 10/05/24 1707 10/05/24 1709   Vital Signs   BP (!) 173/94 (!) 171/95 153/81   MAP (mmHg) (!) 119 (!) 117 (!) 104   BP Location Left arm Left arm Left arm   BP Method Automatic Automatic Automatic   Patient Position Lying Sitting Standing     Orthostatics upon admission 10/5

## 2024-10-05 NOTE — ED INITIAL ASSESSMENT (HPI)
Patient c/o sob for several days now, worse when laying down, pain on bilateral shoulder and mid back. States he has some chest tightness and coughing up transparent mucus, has slight headache. Denies fevers.

## 2024-10-05 NOTE — ED PROVIDER NOTES
Patient Seen in: [facility]      History     Chief Complaint   Patient presents with    Shortness Of Breath    Cough/URI    Chest Congestion     Stated Complaint: NOT FEELING WELL    Subjective:   HPI    64-year-old male here with complaint of left-sided chest pain , weakness and shortness of breath all days is gotten progressively worse.  She has an extensive cardiac history.  Patient has bilateral shoulder pain and the pain is going to the mid back region.  Patient cannot recollect any injury trauma however there is bruising and swelling to the chest wall.  Abdominal pain, vomiting or diarrhea.  Afebrile      Objective:     Past Medical History:    CAD (coronary artery disease)    Stents x 2 - once in 2006 after MI and prior to CABG and again in 2010.    CAD (coronary artery disease), autologous vein bypass graft    DM type 2, uncontrolled, with renal complications    Essential hypertension    Hyperlipidemia    Hyperlipidemia LDL goal < 70    Hypertension    Hypertension, renal disease    Hypertensive heart/renal disease with failure    S/P CABG x 5    CABG x 5 in 2006    Systolic CHF (HCC)    Type 1 diabetes mellitus (HCC)            The patient's medication list, past medical history and social history elements  as listed in today's nurse's notes are reviewed and agree.   The patient's family history is reviewed and is noncontributory to the presenting problem, except as indicated as above.     Past Surgical History:   Procedure Laterality Date    Angioplasty (coronary)  2006    stent x2    Angioplasty (coronary)  2010    stent x1    Cabg  2006    CABG x 5    Other surgical history  2007    Bilateral lens replacement    Other surgical history  2009    Lasik bilaterally    Tonsillectomy  At age 11                Social History     Socioeconomic History    Marital status:     Number of children: 4   Occupational History    Occupation: Auto repair   Tobacco Use    Smoking status: Never     Smokeless tobacco: Never   Vaping Use    Vaping status: Never Used   Substance and Sexual Activity    Alcohol use: Not Currently     Comment: occ    Drug use: No    Sexual activity: Not Currently   Other Topics Concern    Caffeine Concern Yes     Comment: 2-3 cups of coffee weekly    Exercise No    Seat Belt Yes     Social Determinants of Health     Physical Activity: Inactive (7/20/2020)    Received from [employer] [employer]    Exercise Vital Sign     Days of Exercise per Week: 0 days     Minutes of Exercise per Session: 0 min    [facility] [employer]              Review of Systems    Positive for stated complaint: NOT FEELING WELL  Other systems are as noted in HPI.  Constitutional and vital signs reviewed.      All other systems reviewed and negative except as noted above.    Physical Exam     ED Triage Vitals [10/05/24 1027]   BP (!) 176/99   Pulse 84   Resp 16   Temp 97.7 °F (36.5 °C)   Temp src    SpO2 95 %   O2 Device None (Room air)       Current Vitals:   Vital Signs  BP: (!) 176/99  Pulse: 84  Resp: 16  Temp: 97.7 °F (36.5 °C)    Oxygen Therapy  SpO2: 95 %  O2 Device: None (Room air)        Physical Exam  Vitals and nursing note reviewed.   Constitutional:       Appearance: He is well-developed.   HENT:      Head: Normocephalic.      Right Ear: External ear normal.      Left Ear: External ear normal.      Nose: Nose normal.      Mouth/Throat:      Mouth: Mucous membranes are moist.   Eyes:      Conjunctiva/sclera: Conjunctivae normal.      Pupils: Pupils are equal, round, and reactive to light.   Cardiovascular:      Rate and Rhythm: Normal rate and regular rhythm.      Heart sounds: Normal heart sounds.   Pulmonary:      Effort: Pulmonary effort is normal.      Breath sounds: Decreased breath sounds present.   Chest:      Chest wall: Swelling and tenderness present.          Comments: Chest wall: ecchymosis/tender/swelling  Musculoskeletal:       Cervical back: Normal range of motion and neck supple.   Skin:     General: Skin is warm.   Neurological:      Mental Status: He is alert and oriented to person, place, and time.   Psychiatric:         Behavior: Behavior normal.         Thought Content: Thought content normal.         Judgment: Judgment normal.           ED Course     Labs Reviewed - No data to display    EKG    Rate, intervals and axes as noted on EKG Report.  Rate: 83 BPM  Rhythm: Normal sinus rhythm  Reading: T wave abnormality: lateral ischemia: Prolonged QT: Abnormal EKG            NOTE: 911 was called  NOTE: patient nor wife have any recollection of chest injury      Heart Score:    HEART Score      Title      Criteria Score   Age: 45-64 Age Score: 1   History: Highly Suspicious Hx Score: 2     EKG: Signif ST Deviation EKG Score: 2   HTN: Yes   Hypercholesterolemia: Yes   Atherosclerosis/PVD: Yes     DM: Yes   BMI>30kg/m2: No   Smoking: No   Family History: Yes         Other Risk Factor Score: 5                     HEART Score: 7        Risk of adverse cardiac event is 50-65%                MDM     Clinical Impression: chest pain L sided/SOB  Course of Treatment:     Patient is going via 911.    This case was discussed with the attending physician please see the attestation.             Disposition and Plan     Clinical Impression:  1. Left-sided chest pain    2. Dyspnea, unspecified type         Disposition:  Ic to ed  10/5/2024 10:55 am    Follow-up:  Say Ravi MD  [address] [address]  [phone]                Medications Prescribed:  Discharge Medication List as of 10/5/2024 10:56 AM              Supplementary Documentation:

## 2024-10-05 NOTE — ED INITIAL ASSESSMENT (HPI)
Pt arrives via ems from . Pt presents with c/o of left-sided chest pain/pressure , weakness and shortness of breath for about 3 days and is gotten progressively worse.  Pt has an extensive cardiac history.  Patient also reports right sided back pain. Pain was 8/10, given nitroglycerin by medics and pain went down to 3/10. A/Ox4.

## 2024-10-05 NOTE — H&P
[facility]                                                               [facility] & [facility]         Roldan Mast Patient Status:  Inpatient    1960 MRN [id number]   Location [facility] 2NE-A Attending Michaelle Raines MD   Hosp Day # 0 PCP Say Ravi MD     Chief Complaint: Shortness of breath    History of Present Illness:  Roldan Mast is a 64 year old male admitted with shortness of breath.  Started few days back according to patient.  Worse when lying down.  Complains of chest pressure radiating to back and shoulder.  Denies any fever or chills.  Denies any cold or cough symptoms.  Patient seen with patient spouse and patient's daughters at bedside.  Patient went to the immediate care in [place] today for the symptoms and was sent to the emergency room.  Denies any abdominal pain.  Denies any nausea vomiting or diarrhea.  Patient has history of previous CAD with previous CABG and has had previous stent.      History:  Past Medical History:    CAD (coronary artery disease)    Stents x 2 - once in  after MI and prior to CABG and again in .    CAD (coronary artery disease), autologous vein bypass graft    Diabetes (HCC)    DM type 2, uncontrolled, with renal complications    Essential hypertension    Hyperlipidemia    Hyperlipidemia LDL goal < 70    Hypertension    Hypertension, renal disease    Hypertensive heart/renal disease with failure    S/P CABG x 5    CABG x 5 in     Systolic CHF (HCC)       Past Surgical History:   Procedure Laterality Date    Angioplasty (coronary)  2006    stent x2    Angioplasty (coronary)  2010    stent x1    Cabg  2006    CABG x 5    Other surgical history  2007    Bilateral lens replacement    Other surgical history  2009    Lasik bilaterally    Tonsillectomy  At age 11       Family history:  Family History   Problem Relation Age of Onset    Diabetes Mother         Type 2 DM on oral agents    Thyroid Disorder Mother         Thyroid history  - not able to elaborate    Other (Other) Mother         See thyroid tab    Heart Disorder Maternal Grandmother         CAD    Diabetes Paternal Grandmother         Type 2 DM    Diabetes Brother         Type 2 DM on oral agents    Diabetes Daughter         Type 2 DM on oral agents    Diabetes Daughter         Type 2 DM on oral agents    Thyroid Disorder Daughter         Thyroid history - unable to elaborate    Other (Other) Daughter         See thyroid tab    Diabetes Brother         Type 2 DM on oral agents    Eye Problems Neg       Reviewed    Social history:   reports that he has never smoked. He has never used smokeless tobacco. He reports that he does not currently use alcohol. He reports that he does not use drugs.    Allergies:  Allergies   Allergen Reactions    Adenosine     Spironolactone        Home Medications:  Medications Prior to Admission   Medication Sig Dispense Refill Last Dose    carvedilol 25 MG Oral Tab Take 1 tablet (25 mg total) by mouth 2 (two) times daily. 60 tablet 0     ezetimibe 10 MG Oral Tab Take 1 tablet (10 mg total) by mouth daily. 30 tablet 0     losartan 100 MG Oral Tab Take 1 tablet (100 mg total) by mouth daily. 30 tablet 0     clopidogrel 75 MG Oral Tab Take 1 tablet (75 mg total) by mouth daily. 30 tablet 0     atorvastatin 40 MG Oral Tab Take 1 tablet (40 mg total) by mouth daily. 30 tablet 0     empagliflozin (JARDIANCE) 10 MG Oral Tab Take 1 tablet (10 mg total) by mouth daily. 90 tablet 0     amLODIPine 5 MG Oral Tab Take 1 tablet (5 mg total) by mouth daily. 90 tablet 0     aspirin 325 MG Oral Tab Take 1 tablet daily. 90 tabs 3 refills 90 tablet 0     insulin glargine (BASAGLAR KWIKPEN) 100 UNIT/ML Subcutaneous Solution Pen-injector 28 units BID 18 each 0     Insulin Lispro, 1 Unit Dial, (HUMALOG KWIKPEN) 100 UNIT/ML Subcutaneous Solution Pen-injector 10- 15 units  in divided doses twice daily 15 mL 0     azithromycin (ZITHROMAX Z-KENAN) 250 MG Oral Tab 500 mg once followed  by 250 mg daily x 4 days (Patient not taking: Reported on 6/27/2024) 6 tablet 0     Continuous Blood Gluc Sensor (FREESTYLE CHARLEE 2 SENSOR) Does not apply Misc 1 each every 14 (fourteen) days. 2 each 11     Alcohol Swabs (BD SWAB SINGLE USE REGULAR) Does not apply Pads        Cholecalciferol (VITAMIN D) 1000 UNITS Oral Tab Take 1 tablet by mouth 2 (two) times daily. 180 tablet 2        Review of Systems:  A comprehensive 14 point review of systems was completed.  Pertinent positives and negatives noted in the the HPI.    Physical Exam:     Vital signs: Blood pressure (!) 181/103, pulse 75, temperature 98.6 °F (37 °C), temperature source Oral, resp. rate 21, height 5' 5\" (1.651 m), weight 164 lb (74.4 kg), SpO2 99%.  General: No acute distress.   HEENT: Moist mucous membranes.   Respiratory: Clear to auscultation bilaterally.  No wheezes. No rhonchi.  Cardiovascular: S1, S2.  Regular rate and rhythm.   Abdomen: Soft, nontender, nondistended.  Positive bowel sounds.  Neurologic: No focal neurological deficits.  Musculoskeletal: Full range of motion of all extremities.  Bilateral pitting pedal edema present.  No calf tenderness  Integument: Keloid present over healed sternal scar from previous CABG  Psychiatric: Appropriate mood and affect.      Diagnostic Data:      Laboratory Data:   Lab Results   Component Value Date    WBC 9.9 10/05/2024    HGB 11.7 10/05/2024    HCT 33.6 10/05/2024    .0 10/05/2024    CREATSERUM 3.33 10/05/2024    BUN 52 10/05/2024     10/05/2024    K 5.1 10/05/2024     10/05/2024    CO2 24.0 10/05/2024     10/05/2024    CA 9.2 10/05/2024    ALB 4.1 10/05/2024    ALKPHO 154 10/05/2024    BILT 0.4 10/05/2024    TP 7.2 10/05/2024    AST 15 10/05/2024    ALT 22 10/05/2024    DDIMER 1.54 10/05/2024       Imaging:  Imaging data reviewed in Epic.  XR CHEST AP PORTABLE done on 10/5/2024 shows      Impression   CONCLUSION:    1. Mild interstitial opacities which may represent  mild edema.  2. Bilateral pleural effusions with bilateral basilar atelectasis/infiltrates.        VQ scan done on 10/5/2024 with low probability of PE.    CT chest without contrast done on 10/5/2024 showed moderate to large bilateral pleural effusion with adjacent areas of passive atelectasis    ASSESSMENT / PLAN:     #Acute on chronic CHF exacerbation, with exertional shortness of breath and chest discomfort and bilateral pleural effusion  #Bilateral pleural effusion with atelectasis  CHF protocol  IV Lasix given from the emergency room, repeat BMP in a.m. before adding more diuretics as kidney function test already elevated to 3.33  Daily weight  I/O chart  proBNP elevated on admission  Troponin negative x 2  VQ scan with low probability of PE  CT chest without contrast showed moderate bilateral pleural effusion with adjacent areas of passive atelectasis  Incentive spirometry  Check 2D echo  Cardiology consult  Bilateral large pleural effusion-pulmonary consult in case needs thoracentesis for the large pulmonary effusion if unresponsive to diuresis.  Diuresis limited by patient's elevated kidney function test.  #Acute kidney injury on chronic kidney disease stage III  On chart review patient had a creatinine of 1.52 on GFR of 52 on 2/13/2023.  Creatinine of 3.33 with a GFR of 28 today on admission  Follow BMP in a.m.  Nephrology consult  #Anemia of chronic kidney disease  Check iron studies  Follow CBC in a.m.  #Diabetes type 2 with hyperglycemia and CKD stage III  Hyperglycemia protocol  Follow Accu-Cheks  Tresiba long-acting insulin at a lower dose than at home as kidney function test elevated  NovoLog carb counting and correction factor  #Hypertension with hypertensive urgency on admission  Continue home Norvasc, Coreg, losartan  Blood pressure not controlled at home either according to patient and patient's family at bedside  Patient received IV labetalol from the emergency room along with IV Lasix  Will give  IV hydralazine as needed  Will adjust blood pressure medications in hospital  #Hyperlipidemia  Continue home statin and Zetia  #CAD with previous CABG and stents  Monitor on telemetry  Aspirin, statin, Coreg, Plavix  Cardiology consult    Quality:  DVT Prophylaxis: DVT Mechanical Prophylaxis:   SCDs,    DVT Pharmacologic Prophylaxis   Medication    heparin (Porcine) 5000 UNIT/ML injection 5,000 Units              CODE status:   Code Status: Not on file  John: No urinary catheter in place      Plan of care discussed with patient, patient's family including patient's wife and patient's daughters at bedside      Discussed with ER Physician.        Michaelle Raines MD  10/5/2024  4:55 PM

## 2024-10-05 NOTE — ED QUICK NOTES
Orders for admission, patient is aware of plan and ready to go upstairs. Any questions, please call ED RN aidee at extension [phone].     Patient Covid vaccination status: Fully vaccinated     COVID Test Ordered in ED: SARS-CoV-2/Flu A and B/RSV by PCR ([facility]pert)    COVID Suspicion at Admission: N/A    Running Infusions:  None    Mental Status/LOC at time of transport: A&Ox4    Other pertinent information:   CIWA score: N/A   NIH score:  N/A

## 2024-10-05 NOTE — PLAN OF CARE
Pt admitted from ED at 1700. AxOx4. Room air. Denies pain/SOB. Vitals stable. NSR on tele.  See flowsheets for additional assessments.   Pt updated on POC. Admission navigator complete. Oriented to room. Call light within reach. All needs met at this time.     Plan:  -Cards & renal eval  -Echo  -DW // I&O    Problem: Patient/Family Goals  Goal: Patient/Family Long Term Goal  Description: Patient's Long Term Goal: go home    Interventions:  - echo, IV lasix  - See additional Care Plan goals for specific interventions  Outcome: Progressing  Goal: Patient/Family Short Term Goal  Description: Patient's Short Term Goal: feel better    Interventions:   - echo, IV lasix  - See additional Care Plan goals for specific interventions  Outcome: Progressing

## 2024-10-06 ENCOUNTER — APPOINTMENT (OUTPATIENT)
Dept: CV DIAGNOSTICS | Facility: HOSPITAL | Age: 64
End: 2024-10-06
Attending: INTERNAL MEDICINE
Payer: MEDICAID

## 2024-10-06 PROBLEM — D63.8 ANEMIA, CHRONIC DISEASE: Status: ACTIVE | Noted: 2024-10-06

## 2024-10-06 PROBLEM — R09.02 HYPOXIA: Status: ACTIVE | Noted: 2024-10-06

## 2024-10-06 PROBLEM — N17.9 AKI (ACUTE KIDNEY INJURY) (HCC): Status: ACTIVE | Noted: 2024-10-06

## 2024-10-06 PROBLEM — J90 PLEURAL EFFUSION: Status: ACTIVE | Noted: 2024-10-06

## 2024-10-06 PROBLEM — N18.4 CKD (CHRONIC KIDNEY DISEASE) STAGE 4, GFR 15-29 ML/MIN (HCC): Status: ACTIVE | Noted: 2024-10-06

## 2024-10-06 PROBLEM — N17.9 AKI (ACUTE KIDNEY INJURY): Status: ACTIVE | Noted: 2024-10-06

## 2024-10-06 LAB
ANION GAP SERPL CALC-SCNC: 7 MMOL/L (ref 0–18)
BASOPHILS # BLD AUTO: 0.03 X10(3) UL (ref 0–0.2)
BASOPHILS NFR BLD AUTO: 0.4 %
BUN BLD-MCNC: 47 MG/DL (ref 9–23)
CALCIUM BLD-MCNC: 9.2 MG/DL (ref 8.7–10.4)
CHLORIDE SERPL-SCNC: 105 MMOL/L (ref 98–112)
CO2 SERPL-SCNC: 22 MMOL/L (ref 21–32)
CREAT BLD-MCNC: 3.3 MG/DL
CREAT UR-SCNC: 19.6 MG/DL
EGFRCR SERPLBLD CKD-EPI 2021: 20 ML/MIN/1.73M2 (ref 60–?)
EOSINOPHIL # BLD AUTO: 0.16 X10(3) UL (ref 0–0.7)
EOSINOPHIL NFR BLD AUTO: 2.3 %
ERYTHROCYTE [DISTWIDTH] IN BLOOD BY AUTOMATED COUNT: 12.6 %
GLUCOSE BLD-MCNC: 106 MG/DL (ref 70–99)
GLUCOSE BLD-MCNC: 112 MG/DL (ref 70–99)
GLUCOSE BLD-MCNC: 126 MG/DL (ref 70–99)
GLUCOSE BLD-MCNC: 137 MG/DL (ref 70–99)
GLUCOSE BLD-MCNC: 157 MG/DL (ref 70–99)
GLUCOSE BLD-MCNC: 69 MG/DL (ref 70–99)
GLUCOSE BLD-MCNC: 81 MG/DL (ref 70–99)
HCT VFR BLD AUTO: 33 %
HGB BLD-MCNC: 11.3 G/DL
IMM GRANULOCYTES # BLD AUTO: 0.01 X10(3) UL (ref 0–1)
IMM GRANULOCYTES NFR BLD: 0.1 %
LYMPHOCYTES # BLD AUTO: 1.77 X10(3) UL (ref 1–4)
LYMPHOCYTES NFR BLD AUTO: 26 %
MAGNESIUM SERPL-MCNC: 2 MG/DL (ref 1.6–2.6)
MCH RBC QN AUTO: 28.8 PG (ref 26–34)
MCHC RBC AUTO-ENTMCNC: 34.2 G/DL (ref 31–37)
MCV RBC AUTO: 84 FL
MONOCYTES # BLD AUTO: 0.66 X10(3) UL (ref 0.1–1)
MONOCYTES NFR BLD AUTO: 9.7 %
NEUTROPHILS # BLD AUTO: 4.19 X10 (3) UL (ref 1.5–7.7)
NEUTROPHILS # BLD AUTO: 4.19 X10(3) UL (ref 1.5–7.7)
NEUTROPHILS NFR BLD AUTO: 61.5 %
OSMOLALITY SERPL CALC.SUM OF ELEC: 291 MOSM/KG (ref 275–295)
PLATELET # BLD AUTO: 214 10(3)UL (ref 150–450)
POTASSIUM SERPL-SCNC: 5 MMOL/L (ref 3.5–5.1)
PROT UR-MCNC: 81.1 MG/DL (ref ?–14)
PROT/CREAT UR-RTO: 4.14
RBC # BLD AUTO: 3.93 X10(6)UL
SODIUM SERPL-SCNC: 108 MMOL/L
SODIUM SERPL-SCNC: 134 MMOL/L (ref 136–145)
TROPONIN I SERPL HS-MCNC: 11 NG/L
WBC # BLD AUTO: 6.8 X10(3) UL (ref 4–11)

## 2024-10-06 PROCEDURE — 93306 TTE W/DOPPLER COMPLETE: CPT | Performed by: INTERNAL MEDICINE

## 2024-10-06 PROCEDURE — 99232 SBSQ HOSP IP/OBS MODERATE 35: CPT | Performed by: INTERNAL MEDICINE

## 2024-10-06 PROCEDURE — 99291 CRITICAL CARE FIRST HOUR: CPT | Performed by: INTERNAL MEDICINE

## 2024-10-06 PROCEDURE — 99223 1ST HOSP IP/OBS HIGH 75: CPT | Performed by: INTERNAL MEDICINE

## 2024-10-06 RX ORDER — FUROSEMIDE 10 MG/ML
40 INJECTION INTRAMUSCULAR; INTRAVENOUS DAILY
Status: DISCONTINUED | OUTPATIENT
Start: 2024-10-06 | End: 2024-10-08

## 2024-10-06 RX ORDER — CARVEDILOL 12.5 MG/1
25 TABLET ORAL 2 TIMES DAILY WITH MEALS
Status: DISCONTINUED | OUTPATIENT
Start: 2024-10-06 | End: 2024-10-06

## 2024-10-06 RX ORDER — INSULIN DEGLUDEC 100 U/ML
14 INJECTION, SOLUTION SUBCUTANEOUS NIGHTLY
Status: DISCONTINUED | OUTPATIENT
Start: 2024-10-06 | End: 2024-10-07

## 2024-10-06 RX ORDER — CARVEDILOL 12.5 MG/1
25 TABLET ORAL 2 TIMES DAILY WITH MEALS
Status: DISCONTINUED | OUTPATIENT
Start: 2024-10-06 | End: 2024-10-08

## 2024-10-06 NOTE — ED PROVIDER NOTES
Patient Seen in: [facility]      History     Chief Complaint   Patient presents with    Chest Pain Angina     Stated Complaint: Chest pain    Subjective:   HPI  Patient is a 63 yo M with a history of CAD s/p CABG, CHF (EF 35-40%), HTN who presents to ED for evaluation of SOB over past 3-4 days associated with orthopnea. SOB improves sitting upright. Patient also notes he has some intermittent chest pressure with bilateral shoulder pain. He has also had associated R sided thoracic back pain which he has been putting lido patches on with improvement. Pt denies any fevers, cough, hemoptysis, abd pain, leg swelling, vomiting. Pt went to IC who referred him to ED. Pt received ASA with EMS. He is not on any blood thinners and denies any history of PE, recent travel or surgeries.     Cardiology clinic note on 8/12/2024 reviewed with impression/plan below:  \"1. Chronic congestive heart failure due to systolic dysfunction/cardiorenal syndrome, chronic kidney disease. The patient is unable to afford Entresto or other items and GDMT. He went back to losartan. The patient is still symptomatic. Limited options due to the presence of cardiorenal syndrome. We will refer the patient to advanced heart failure program, i.e., [facility].  2. Chronic kidney disease. The patient was seen by a nephrologist.  3. Hypertension. Blood pressure does not appear to be well controlled. We will add clonidine.  4. Ischemic cardiomyopathy. Mild mitral regurgitation by echo. Left ventricular ejection fraction 35-40%.  5. History of coronary artery disease, status, status post myocardial infarction, status post stent x2 in 2006 and 2010, status post coronary artery bypass grafting x5 in 2006 at [facility]. Nuclear stress test 05/20/2024, left ventricular ejection fraction 40% with fixed inferior defect, no ischemia.  6. Dyslipidemia. Continue statin therapy.  7. Diabetes mellitus. Management as per primary care physician.  8.  History of thyroid disorder.\"        Objective:     Past Medical History:    CAD (coronary artery disease)    Stents x 2 - once in 2006 after MI and prior to CABG and again in 2010.    CAD (coronary artery disease), autologous vein bypass graft    Diabetes (HCC)    DM type 2, uncontrolled, with renal complications    Essential hypertension    Hyperlipidemia    Hyperlipidemia LDL goal < 70    Hypertension    Hypertension, renal disease    Hypertensive heart/renal disease with failure    S/P CABG x 5    CABG x 5 in 2006    Systolic CHF (HCC)              Past Surgical History:   Procedure Laterality Date    Angioplasty (coronary)  2006    stent x2    Angioplasty (coronary)  2010    stent x1    Cabg  2006    CABG x 5    Other surgical history  2007    Bilateral lens replacement    Other surgical history  2009    Lasik bilaterally    Tonsillectomy  At age 11                Social History     Socioeconomic History    Marital status:     Number of children: 4   Occupational History    Occupation: Auto repair   Tobacco Use    Smoking status: Never    Smokeless tobacco: Never   Vaping Use    Vaping status: Never Used   Substance and Sexual Activity    Alcohol use: Not Currently     Comment: occ    Drug use: No    Sexual activity: Not Currently   Other Topics Concern    Caffeine Concern Yes     Comment: 2-3 cups of coffee weekly    Exercise No    Seat Belt Yes     Social Determinants of Health     Food Insecurity: No Food Insecurity (10/5/2024)    Food Insecurity     Food Insecurity: Never true   Transportation Needs: No Transportation Needs (10/5/2024)    Transportation Needs     Lack of Transportation: No   Physical Activity: Inactive (7/20/2020)    Received from [facility]    Exercise Vital Sign     Days of Exercise per Week: 0 days     Minutes of Exercise per Session: 0 min   Housing Stability: Low Risk  (10/5/2024)    Housing Stability     Housing Instability: No                   Physical Exam     ED Triage Vitals   BP 10/05/24 1130 (!) 180/105   Pulse 10/05/24 1123 85   Resp 10/05/24 1123 25   Temp 10/05/24 1123 98.6 °F (37 °C)   Temp src 10/05/24 1123 Oral   SpO2 10/05/24 1123 100 %   O2 Device 10/05/24 1123 None (Room air)       Current Vitals:   Vital Signs  BP: (!) 173/96  Pulse: 78  Resp: 18  Temp: 98 °F (36.7 °C)  Temp src: Oral  MAP (mmHg): (!) 118    Oxygen Therapy  SpO2: 94 %  O2 Device: None (Room air)  Pulse Oximetry Type: Continuous  Oximetry Probe Site Changed: No  Pulse Ox Probe Location: Right hand        Physical Exam  Vitals and nursing note reviewed.   Constitutional:       General: He is not in acute distress.  HENT:      Head: Normocephalic and atraumatic.      Nose: Nose normal.      Mouth/Throat:      Mouth: Mucous membranes are moist.   Eyes:      Extraocular Movements: Extraocular movements intact.      Pupils: Pupils are equal, round, and reactive to light.   Cardiovascular:      Rate and Rhythm: Normal rate and regular rhythm.      Pulses: Normal pulses.   Pulmonary:      Effort: Pulmonary effort is normal. No respiratory distress.      Breath sounds: Examination of the right-lower field reveals rales. Examination of the left-lower field reveals rales. Rales present. No wheezing.   Chest:      Comments: Chest wall scar with surrounding ecchymoses and TTP  Abdominal:      General: There is no distension.      Palpations: Abdomen is soft.   Musculoskeletal:         General: No swelling. Normal range of motion.      Cervical back: Normal range of motion.      Comments: TTP of R thoracic region   Skin:     General: Skin is warm and dry.      Capillary Refill: Capillary refill takes less than 2 seconds.   Neurological:      General: No focal deficit present.      Mental Status: He is alert.   Psychiatric:         Mood and Affect: Mood normal.             ED Course     Labs Reviewed   CBC WITH DIFFERENTIAL WITH PLATELET - Abnormal; Notable for the following  components:       Result Value    RBC 4.08 (*)     HGB 11.7 (*)     HCT 33.6 (*)     All other components within normal limits   COMP METABOLIC PANEL (14) - Abnormal; Notable for the following components:    Glucose 218 (*)     Sodium 134 (*)     BUN 52 (*)     Creatinine 3.33 (*)     Calculated Osmolality 299 (*)     eGFR-Cr 20 (*)     Alkaline Phosphatase 154 (*)     All other components within normal limits   PRO BETA NATRIURETIC PEPTIDE - Abnormal; Notable for the following components:    Pro-Beta Natriuretic Peptide 8,393 (*)     All other components within normal limits   D-DIMER - Abnormal; Notable for the following components:    D-Dimer 1.54 (*)     All other components within normal limits   HEMOGLOBIN A1C - Abnormal; Notable for the following components:    HgbA1C 7.4 (*)     Estimated Average Glucose 166 (*)     All other components within normal limits   POCT GLUCOSE - Abnormal; Notable for the following components:    POC Glucose 172 (*)     All other components within normal limits   POCT GLUCOSE - Abnormal; Notable for the following components:    POC Glucose 134 (*)     All other components within normal limits   TROPONIN I HIGH SENSITIVITY - Normal   TROPONIN I HIGH SENSITIVITY - Normal   URIC ACID - Normal   SARS-COV-2/FLU A AND B/RSV BY PCR (GENEXPERT) - Normal    Narrative:     This test is intended for the qualitative detection and differentiation of SARS-CoV-2, influenza A, influenza B, and respiratory syncytial virus (RSV) viral RNA in nasopharyngeal or nares swabs from individuals suspected of respiratory viral infection consistent with COVID-19 by their healthcare provider. Signs and symptoms of respiratory viral infection due to SARS-CoV-2, influenza, and RSV can be similar.    Test performed using the Xpert Xpress SARS-CoV-2/FLU/RSV (real time RT-PCR)  assay on the [employer] instrument, [address], [place] [address].   This test is being used under the Food and Drug Administration's  Emergency Use Authorization.    The authorized Fact Sheet for Healthcare Providers for this assay is available upon request from the laboratory.   SODIUM, URINE, RANDOM   URINALYSIS, ROUTINE   URINE PROTEIN/CREATININE RATIO, RANDOM   RAINBOW DRAW LAVENDER   RAINBOW DRAW LIGHT GREEN   RAINBOW DRAW BLUE     EKG #1    Rate, intervals and axes as noted on EKG Report.  Rate: 83  Rhythm: Sinus Rhythm  Reading: NSR with ventricular rate of 83, TWI in lateral leads, no acute ST elevations, similar to prior EKG.     EKG #2    Rate, intervals and axes as noted on EKG Report.  Rate: 80  Rhythm: Sinus Rhythm  Reading: NSR with ventricular rate of 83, TWI in lateral leads, no acute ST elevations, similar to prior EKG.               Independent interpretation of imaging : CXR and noted pulmoanry edema, bilateral pleural effusions         MDM      Patient is a 63 yo M with a history of CAD s/p CABG, CHF (EF 35-40%), HTN who presents to ED for evaluation of SOB over past 3-4 days associated with orthopnea.Pt arrives to ED afebrile, hypertensive, satting well on RA. Pt did not take home BP meds last night because of his symptoms. States his SBP is typically between 180-190. Will plan for labs, CXR, and discuss with cardiology.     ED Course:   - CBC shows no leukoctyosis, hgb 11.7  - CMP significant for JOHN with creatinine of 3.33 which is increased from his baseline  - Trop negative x2. BNP 8,393, CXR shows fluid overload and bilateral pleural effusions  - D-dimer elevated. VQ scan showed low probability of PE.   - CT chest obtained without contrast to better evaluate lungs which showed bilateral effusions. Discussed with cardiology who recommended IV lasix 40 mg. Pt's BP improved with home meds and IV labetalol. Pt felt better while in ED. Admitted to [facility].       Records from previous encounters were reviewed from : cardiology clinic and It was noted that pt has CHF due to systolic dysfunction/cardiorenal syndrome,  CKD. Pt unable to afford entresto or other items on GDMT and was symptomatic at this time. Referred to advanced heart failure clinic. HTN not controlled and clonidine was added. Pt has EF of 35-40%.   Differential diagnosis included : CHF exacerbation, ACS, PE, pleural effusion, pneumonia    Management of patient was discussed with : hospitalist, cardiologist, and patient family      Hospitalization was considered : yes  Admission disposition: 10/5/2024 10:06 PM           Medical Decision Making      Disposition and Plan     Clinical Impression:  1. Acute on chronic congestive heart failure, unspecified heart failure type (HCC)    2. Bilateral pleural effusion    3. Chest pressure    4. Hypertensive urgency    5. JOHN (acute kidney injury) ([facility])         Disposition:  Admit  10/5/2024 10:06 pm    Follow-up:  No follow-up provider specified.        Medications Prescribed:  Current Discharge Medication List              Supplementary Documentation:         Hospital Problems       Present on Admission  Date Reviewed: 6/27/2024            ICD-10-CM Noted POA    Acute kidney injury superimposed on CKD (HCC) N17.9, N18.9 10/5/2024 Yes    Acute on chronic congestive heart failure (HCC) I50.9 10/5/2024 Unknown    CAD (coronary artery disease), autologous vein bypass graft I25.810 5/13/2014 Yes    CKD stage 3 secondary to diabetes ([facility]) E11.22, N18.30 4/28/2023 Yes    Fluid overload E87.70 10/5/2024 Unknown    Hyperlipidemia with target low density lipoprotein (LDL) cholesterol less than 70 mg/dL E78.5 Unknown Yes    Primary hypertension I10 10/5/2021 Yes    S/P CABG x 5 Z95.1 Unknown Yes    Overview Addendum 12/3/2012  7:09 PM by Aga Bee     CABG x 5 in 2006         S/P coronary artery stent placement Z95.5 6/14/2012 Yes    Overview Signed 12/3/2012  7:10 PM by Aga Bee     Stents x 2 - once in 2006 after MI and prior to CABG and again in 2010.         Type 2 diabetes mellitus with hyperglycemia, with  long-term current use of insulin (HCC) E11.65, Z79.4 10/5/2021 Yes

## 2024-10-06 NOTE — PROGRESS NOTES
[facility]   part of [facility]     Hospitalist Progress Note     Roldan Mast Patient Status:  Inpatient    1960 MRN [id number]   Location [facility]-A Attending Michaelle Raines MD   Hosp Day # 1 PCP Say Ravi MD     Chief Complaint: Shortness of breath    Subjective:     Patient feels better today.  Shortness of breath improving.  Denies any chest pain.  No nausea.  Cannot lie down flat now according to patient.  Patient seen with nephrologist at bedside and also with patient's multiple family members including patient's wife and patient's daughters at bedside.    Objective:    Review of Systems:   A comprehensive review of systems was completed; pertinent positive and negatives stated in subjective.    Vital signs:  Temp:  [97.8 °F (36.6 °C)-98 °F (36.7 °C)] 98 °F (36.7 °C)  Pulse:  [69-79] 69  Resp:  [13-21] 16  BP: (137-182)/() 145/81  SpO2:  [93 %-100 %] 93 %    Physical Exam:    /81 (BP Location: Right arm)   Pulse 69   Temp 98 °F (36.7 °C) (Oral)   Resp 16   Ht 5' 5\" (1.651 m)   Wt 154 lb 1.6 oz (69.9 kg)   SpO2 93%   BMI 25.64 kg/m²     General: No acute distress.   HEENT: Moist mucous membranes.   Respiratory: Decreased breath sounds at bases otherwise clear to auscultation bilateral  Cardiovascular: S1, S2.  Regular rate and rhythm.   Abdomen: Soft, nontender, nondistended.  Positive bowel sounds.  Neurologic: No focal neurological deficits.  Musculoskeletal: Full range of motion of all extremities.  Trace bilateral pitting pedal edema improved since admission.  No calf tenderness  Integument: Keloid present over healed sternal scar from previous CABG  Psychiatric: Appropriate mood and affect.    Diagnostic Data:    Labs:  Recent Labs   Lab 10/05/24  1120 10/06/24  0824   WBC 9.9 6.8   HGB 11.7* 11.3*   MCV 82.4 84.0   .0 214.0       Recent Labs   Lab 10/05/24  1120 10/06/24  0824   * 112*   BUN 52* 47*   CREATSERUM 3.33* 3.30*   CA 9.2  9.2   ALB 4.1  --    * 134*   K 5.1 5.0    105   CO2 24.0 22.0   ALKPHO 154*  --    AST 15  --    ALT 22  --    BILT 0.4  --    TP 7.2  --        Estimated Creatinine Clearance: 20.2 mL/min (A) (based on SCr of 3.3 mg/dL (H)).    Recent Labs   Lab 10/05/24  1120 10/05/24  1323 10/06/24  0824   TROPHS 14 14 11       No results for input(s): \"PTP\", \"INR\" in the last 168 hours.               Microbiology    No results found for this visit on 10/05/24.      Imaging: Reviewed in Epic.    Medications:    carvedilol  25 mg Oral BID with meals    heparin  5,000 Units Subcutaneous 2 times per day    insulin aspart  1-10 Units Subcutaneous TID AC and HS    insulin degludec  28 Units Subcutaneous Nightly    insulin aspart  1-68 Units Subcutaneous TID CC    aspirin  325 mg Oral Nightly    atorvastatin  40 mg Oral Nightly    clopidogrel  75 mg Oral Nightly    ezetimibe  10 mg Oral Nightly    losartan  100 mg Oral Nightly    amLODIPine  5 mg Oral Daily       Assessment & Plan:        #Acute on chronic CHF exacerbation, with exertional shortness of breath and chest discomfort and bilateral pleural effusion  #Bilateral pleural effusion with atelectasis  CHF protocol  IV Lasix given from the emergency room, with plans to repeat BMP in a.m. before adding more diuretics as kidney function test  elevated to 3.33 on admission.  Creatinine 3.3 today.  Discussed with nephrology, diuresis per nephrology and cardiology.  Patient looks more euvolemic at this time per nephrology today  Daily weight  I/O chart  proBNP elevated on admission  Troponin negative x 2  VQ scan with low probability of PE  CT chest without contrast showed moderate bilateral pleural effusion with adjacent areas of passive atelectasis  Incentive spirometry   2D echo pending  Cardiology consult  Symptoms improving today  Bilateral large pleural effusion-pulmonary consult in case needs thoracentesis for the large pulmonary effusion if unresponsive to diuresis.   Diuresis limited by patient's elevated kidney function test.  #Acute kidney injury on chronic kidney disease stage III versus chronic kidney disease stage IV per nephrology  On chart review patient had a creatinine of 1.52 on GFR of 52 on 2/13/2023.  Creatinine of 3.33 with a GFR of 28 on admission  Follow BMP in a.m.  Nephrology consult  #Anemia of chronic kidney disease  Check iron studies  Follow CBC in a.m.  #Diabetes type 2 with hyperglycemia and hypoglycemia and CKD stage III  Hyperglycemia and hypoglycemia protocol  Follow Accu-Cheks  Tresiba long-acting insulin at a lower dose than at home as kidney function test elevated-patient usually takes long-acting insulin 28 units twice daily at home and was only started on 28 units once daily on 10/5/2024 on admission to hospital and blood sugar still noted to be on lower range ranging from 69-1 72 in hospital.    Decreased Tresiba to half the dose to 14 units daily on 10/6/2024  Discussed with patient and family regarding lower insulin needs with progressive kidney disease with worsening kidney function test and need to closely monitor blood sugar and follow-up with regular outpatient primary care physician  as outpatient also.  Will continue to monitor blood sugars and labs in hospital  NovoLog carb counting and correction factor  #Hypertension with hypertensive urgency on admission  Continue home Norvasc, Coreg   No losartan at this time per cardiology due to elevated kidney function test  Blood pressure not controlled at home either according to patient and patient's family at bedside  Patient received IV labetalol from the emergency room along with IV Lasix   IV hydralazine as needed  Will adjust blood pressure medications in hospital  #Hyperlipidemia  Continue home statin and Zetia  #CAD with previous CABG and stents  Monitor on telemetry  Aspirin, statin, Coreg, Plavix  Cardiology consult, cardiology contemplating cath in future if kidney function test  improves given patient's reduced ejection fraction and prior CAD/PCI.     Discussed with patient, patient's family at bedside.  Discussed with nephrology Dr. Owusu.  Discussed with RN    Michaelle Raines MD    Supplementary Documentation:     Quality:  DVT Mechanical Prophylaxis:   SCDs,    DVT Pharmacologic Prophylaxis   Medication    heparin (Porcine) 5000 UNIT/ML injection 5,000 Units    DVT Pharmacologic prophylaxis: Aspirin 325 mg           Code Status: Not on file  John: No urinary catheter in place  John Duration (in days):   Central line:    CELENA: 10/8/2024    Discharge is dependent on: Clinical progress  At this point Mr. Mast is expected to be discharge to: Home    The 21st Century Cures Act makes medical notes like these available to patients in the interest of transparency. Please be advised this is a medical document. Medical documents are intended to carry relevant information, facts as evident, and the clinical opinion of the practitioner. The medical note is intended as peer to peer communication and may appear blunt or direct. It is written in medical language and may contain abbreviations or verbiage that are unfamiliar.

## 2024-10-06 NOTE — CONSULTS
[facility]  Cardiology Consultation    Roldan Mast Patient Status:  Inpatient    1960 MRN [id number]   Location [facility] 2NE-A Attending Michaelle Raines MD   Hosp Day # 1 PCP Say Ravi MD   Chief Complaint:   Chest pain and SHAFER    History of Present Illness:   Patient is 64 year old male with a history of CAD s/p CABG, CHF (EF 35-40%),  and HTN who has the complaint of left-sided chest pain, along with weakness and shortness of breath.  Over the past few days, these issues have gotten progressively worse.  SOB is worse when lying flat.  Patient went to the immediate care in [place] today for the symptoms and was sent to the emergency room via 911     The patient follows with Dr Duran in [place], and he was last seen in August.     He was given 40mg IV lasix in the ER and notes his symptoms have improved significantly.    ED Course:   - CBC shows no leukoctyosis, hgb 11.7  - CMP significant for JOHN with creatinine of 3.33 which is increased from his baseline  - Trop negative x2. BNP 8,393, CXR shows fluid overload and bilateral pleural effusions  - D-dimer elevated. VQ scan showed low probability of PE.   - CT chest obtained without contrast to better evaluate lungs which showed bilateral effusions. Discussed with cardiology who recommended IV lasix 40 mg. Pt's BP improved with home meds and IV labetalol. Pt felt better while in ED. Ad[facility].       Past Medical History:   Past Medical History:    CAD (coronary artery disease)    Stents x 2 - once in  after MI and prior to CABG and again in .    CAD (coronary artery disease), autologous vein bypass graft    Diabetes (HCC)    DM type 2, uncontrolled, with renal complications    Essential hypertension    Hyperlipidemia    Hyperlipidemia LDL goal < 70    Hypertension    Hypertension, renal disease    Hypertensive heart/renal disease with failure    S/P CABG x 5    CABG x 5 in      Systolic CHF (HCC)       Social History:   Social History     Socioeconomic History    Marital status:      Spouse name: Not on file    Number of children: 4    Years of education: Not on file    Highest education level: Not on file   Occupational History    Occupation: Auto repair   Tobacco Use    Smoking status: Never    Smokeless tobacco: Never   Vaping Use    Vaping status: Never Used   Substance and Sexual Activity    Alcohol use: Not Currently     Comment: occ    Drug use: No    Sexual activity: Not Currently   Other Topics Concern    Caffeine Concern Yes     Comment: 2-3 cups of coffee weekly    Exercise No    Seat Belt Yes    Special Diet Not Asked    Stress Concern Not Asked    Weight Concern Not Asked     Service Not Asked    Blood Transfusions Not Asked    Occupational Exposure Not Asked    Hobby Hazards Not Asked    Sleep Concern Not Asked    Back Care Not Asked    Bike Helmet Not Asked    Self-Exams Not Asked   Social History Narrative    Not on file     Social Determinants of Health     Financial Resource Strain: Not on file   Food Insecurity: No Food Insecurity (10/5/2024)    Food Insecurity     Food Insecurity: Never true   Transportation Needs: No Transportation Needs (10/5/2024)    Transportation Needs     Lack of Transportation: No     Car Seat: Not on file   Physical Activity: Inactive (7/20/2020)    Received from [email or web address]    Exercise Vital Sign     Days of Exercise per Week: 0 days     Minutes of Exercise per Session: 0 min   Stress: Not on file   Social Connections: Not on file   Housing Stability: Low Risk  (10/5/2024)    Housing Stability     Housing Instability: No     Housing Instability Emergency: Not on file     Crib or Bassinette: Not on file       Family History:   Family History   Problem Relation Age of Onset    Diabetes Mother         Type 2 DM on oral agents    Thyroid Disorder Mother         Thyroid history - not able to  elaborate    Other (Other) Mother         See thyroid tab    Heart Disorder Maternal Grandmother         CAD    Diabetes Paternal Grandmother         Type 2 DM    Diabetes Brother         Type 2 DM on oral agents    Diabetes Daughter         Type 2 DM on oral agents    Diabetes Daughter         Type 2 DM on oral agents    Thyroid Disorder Daughter         Thyroid history - unable to elaborate    Other (Other) Daughter         See thyroid tab    Diabetes Brother         Type 2 DM on oral agents    Eye Problems Neg        Medications:   Scheduled:    carvedilol  25 mg Oral BID with meals    heparin  5,000 Units Subcutaneous 2 times per day    insulin aspart  1-10 Units Subcutaneous TID AC and HS    insulin degludec  28 Units Subcutaneous Nightly    insulin aspart  1-68 Units Subcutaneous TID CC    aspirin  325 mg Oral Nightly    atorvastatin  40 mg Oral Nightly    clopidogrel  75 mg Oral Nightly    ezetimibe  10 mg Oral Nightly    losartan  100 mg Oral Nightly    amLODIPine  5 mg Oral Daily       Continuous Infusion:       PRN Medications:     glucose **OR** glucose **OR** glucose-vitamin C **OR** dextrose **OR** glucose **OR** glucose **OR** glucose-vitamin C    acetaminophen    melatonin    ondansetron    influenza virus vaccine PF    hydrALAzine    Outpatient Medications:   Current Facility-Administered Medications on File Prior to Encounter   Medication Dose Route Frequency Provider Last Rate Last Admin    [COMPLETED] aspirin chewable tab 324 mg  324 mg Oral Once Yesi Sierra PA   324 mg at 10/05/24 1047     Current Outpatient Medications on File Prior to Encounter   Medication Sig Dispense Refill    carvedilol 25 MG Oral Tab Take 1 tablet (25 mg total) by mouth nightly.      aspirin 325 MG Oral Tab Take 1 tablet (325 mg total) by mouth nightly.      ezetimibe 10 MG Oral Tab Take 1 tablet (10 mg total) by mouth daily. (Patient taking differently: Take 1 tablet (10 mg total) by mouth nightly.) 30 tablet 0     losartan 100 MG Oral Tab Take 1 tablet (100 mg total) by mouth daily. (Patient taking differently: Take 1 tablet (100 mg total) by mouth nightly.) 30 tablet 0    clopidogrel 75 MG Oral Tab Take 1 tablet (75 mg total) by mouth daily. (Patient taking differently: Take 1 tablet (75 mg total) by mouth nightly.) 30 tablet 0    atorvastatin 40 MG Oral Tab Take 1 tablet (40 mg total) by mouth daily. (Patient taking differently: Take 1 tablet (40 mg total) by mouth nightly.) 30 tablet 0    amLODIPine 5 MG Oral Tab Take 1 tablet (5 mg total) by mouth daily. (Patient taking differently: Take 1 tablet (5 mg total) by mouth nightly.) 90 tablet 0    insulin glargine (BASAGLAR KWIKPEN) 100 UNIT/ML Subcutaneous Solution Pen-injector 28 units BID 18 each 0    Insulin Lispro, 1 Unit Dial, (HUMALOG KWIKPEN) 100 UNIT/ML Subcutaneous Solution Pen-injector 10- 15 units  in divided doses twice daily 15 mL 0    Continuous Blood Gluc Sensor (FREESTYLE CHARLEE 2 SENSOR) Does not apply Misc 1 each every 14 (fourteen) days. 2 each 11    Alcohol Swabs (BD SWAB SINGLE USE REGULAR) Does not apply Pads          Allergies:   Allergies   Allergen Reactions    Adenosine     Spironolactone        Review of Systems:   Ten point review of systems is otherwise unremarkable except as mentioned above.    Physical Exam:   Vitals:    10/06/24 0809   BP: 145/81   Pulse: 69   Resp: 16   Temp: 98 °F (36.7 °C)     Wt Readings from Last 3 Encounters:   10/06/24 154 lb 1.6 oz (69.9 kg)   10/05/24 160 lb (72.6 kg)   06/27/24 153 lb 8 oz (69.6 kg)       General: Well developed, well nourished male.  Pt is in no acute distress.  Neck:  No JVD.  Supple with normal ROM.  Cardiac: Regular rate and rhythm.  No murmurs, rubs, or gallops.   Lungs: Clear to ascultation bilaterally.    Abdomen: Soft.  Non-distended.  Non-tender.  Bowel sounds are present.  No guarding or rebound.   Extremities: Warm, no significant edema.  Palpable pulses.  Neurologic: Alert and oriented,  normal affect.  No gross deficit appreciated.  Integument:  No visible rashes are appreciated.  Psych: The affect is appropriate.    Labs:    Recent Labs   Lab 10/05/24  1120 10/06/24  0824   * 112*   BUN 52* 47*   CREATSERUM 3.33* 3.30*   CA 9.2 9.2   ALB 4.1  --    * 134*   K 5.1 5.0    105   CO2 24.0 22.0   ALKPHO 154*  --    AST 15  --    ALT 22  --    BILT 0.4  --    TP 7.2  --      Recent Labs   Lab 10/05/24  1120 10/06/24  0824   RBC 4.08* 3.93*   HGB 11.7* 11.3*   HCT 33.6* 33.0*   MCV 82.4 84.0   MCH 28.7 28.8   MCHC 34.8 34.2   RDW 12.7 12.6   NEPRELIM 6.59 4.19   WBC 9.9 6.8   .0 214.0       Diagnostics:   Tele: Sinus.  EKG: Sinus.  LVH.  NSST.  Echo:   5/29/24  1. Left ventricle: The cavity size is mildly increased. Wall thickness is      mildly increased. Systolic function is moderately reduced by visual      assessment. The estimated ejection fraction is 35-40%. Severe hypokinesis      of the basalinferior wall.   2. Aortic valve: Trileaflet. The leaflets are mildly thickened. Velocity is      within the normal range. There is no stenosis. The mean systolic gradient      is 2mm Hg.   3. Mitral valve: There is mild regurgitation.   4. Left atrium: The atrium is mildly dilated.   5. Right ventricle: The cavity size is normal. Wall thickness is normal.      Systolic function is normal by visual assessment. Estimation of the right      ventricular systolic pressure is within the normal range. The estimated      peak pressure is 30mm Hg.   6. Tricuspid valve: There is mild regurgitation.   7. Pericardium, extracardiac: There is no significant pericardial effusion.   Stress  5/20/24  - Abnormal nuclear perfusion study.   - There is no evidence of myocardial ischemia.   - There is evidence of myocardial infarction.   - Abnormal regional wall thickening is noted on gated analysis.   - Abnormal ejection fraction.   - Negatve study after submaximal exercise, patient refused Lexiscan.         Assessment:  ASSESSMENT:  Acute on chronic systolic congestive heart failure (HCC) ischemic  Bilateral pleural effusion (small present in May)   Trp neg x 2   VQ neg PE  Out pt wt 154 lbs     CAD   S/P coronary artery stent placement  Stents x 2 - once in 2006 after MI and prior to CABG and again in 2010.   CAD (coronary artery disease), autologous vein bypass graft 5/13/2014     Hyperlipidemia with target low density lipoprotein (LDL) cholesterol less than 70 mg/dL  Type 2 diabetes mellitus with hyperglycemia, with long-term current use of insulin ([facility])  Primary hypertension, needs better control  CKD stage 3 secondary to diabetes ([facility])  Acute kidney injury superimposed on CKD ([facility])  Baseline 1.5  Cr currently 3.3   Anemia Hgb 12.     PLAN:  Echo today   Consider cath in the future if his Cr improves given his reduced EF and prior CAD/PCI.  Repeat labs   \"GDMT\" if possible, hold ARB and no Entresto for now  Nephrology consult ([facility])  Increase coreg for improved BP control (pt c/o dizziness with bid dosing)       Thank you for allowing our group to care for your patient. Please contact me with any questions.   Denver Benitez MD  [phone]

## 2024-10-06 NOTE — CONSULTS
[facility]  Pulmonary/Critical Care Consult note    Roldan Mast Patient Status:  Inpatient    1960 MRN [id number]   Location [facility] 2NE-A Attending Michaelle Raines MD   Hosp Day # 1 PCP Say Ravi MD     Reason for Consultation:  Shortness of breath     History of Present Illness:  Roldan Mast is a a(n) 64 year old male.  Non-smoker with history of coronary artery disease, diabetes mellitus, hypertension, hyperlipidemia, history of systolic CHF and coronary artery bypass surgery in .  The patient presented to the emergency department on 10/5/2024 with shortness of breath for last several days.  He reported dyspnea on lying down.  He also reported chest pain on left side CT chest shows moderate pleural effusion so pulmonary consultation was obtained.    The patient admits to persistent shortness of breath with orthopnea although better than yesterday.  He is currently on room air.  Denies any cough, sputum production, chest pains, fever, chills, swallowing difficulty, abdominal pain diarrhea or constipation.    History:  Past Medical History:    CAD (coronary artery disease)    Stents x 2 - once in  after MI and prior to CABG and again in .    CAD (coronary artery disease), autologous vein bypass graft    Diabetes (HCC)    DM type 2, uncontrolled, with renal complications    Essential hypertension    Hyperlipidemia    Hyperlipidemia LDL goal < 70    Hypertension    Hypertension, renal disease    Hypertensive heart/renal disease with failure    S/P CABG x 5    CABG x 5 in     Systolic CHF (HCC)     Past Surgical History:   Procedure Laterality Date    Angioplasty (coronary)  2006    stent x2    Angioplasty (coronary)  2010    stent x1    Cabg  2006    CABG x 5    Other surgical history  2007    Bilateral lens replacement    Other surgical history  2009    Lasik bilaterally    Tonsillectomy  At age 11     Family History   Problem Relation Age of Onset    Diabetes  Issued thumb spica brace for left hand. Patient acknolwedged understanding of use.    DISCHARGE SUMMARY:  Discharge Time: 1616  Via:  Ambulatory  Accompanied by:  Self  Verbal Instructions given: Yes  Verbalized understanding: Yes  Written Instructions given: Yes  Patient aware script sent to preferred pharmacy.     Mother         Type 2 DM on oral agents    Thyroid Disorder Mother         Thyroid history - not able to elaborate    Other (Other) Mother         See thyroid tab    Heart Disorder Maternal Grandmother         CAD    Diabetes Paternal Grandmother         Type 2 DM    Diabetes Brother         Type 2 DM on oral agents    Diabetes Daughter         Type 2 DM on oral agents    Diabetes Daughter         Type 2 DM on oral agents    Thyroid Disorder Daughter         Thyroid history - unable to elaborate    Other (Other) Daughter         See thyroid tab    Diabetes Brother         Type 2 DM on oral agents    Eye Problems Neg       reports that he has never smoked. He has never used smokeless tobacco. He reports that he does not currently use alcohol. He reports that he does not use drugs.    Allergies:  Allergies   Allergen Reactions    Adenosine     Spironolactone        Medications:    Current Facility-Administered Medications:     carvedilol (Coreg) tab 25 mg, 25 mg, Oral, BID with meals    glucose (Dex4) 15 GM/59ML oral liquid 15 g, 15 g, Oral, Q15 Min PRN **OR** glucose (Glutose) 40% oral gel 15 g, 15 g, Oral, Q15 Min PRN **OR** glucose-vitamin C (Dex-4) chewable tab 4 tablet, 4 tablet, Oral, Q15 Min PRN **OR** dextrose 50% injection 50 mL, 50 mL, Intravenous, Q15 Min PRN **OR** glucose (Dex4) 15 GM/59ML oral liquid 30 g, 30 g, Oral, Q15 Min PRN **OR** glucose (Glutose) 40% oral gel 30 g, 30 g, Oral, Q15 Min PRN **OR** glucose-vitamin C (Dex-4) chewable tab 8 tablet, 8 tablet, Oral, Q15 Min PRN    heparin (Porcine) 5000 UNIT/ML injection 5,000 Units, 5,000 Units, Subcutaneous, 2 times per day    acetaminophen (Tylenol Extra Strength) tab 500 mg, 500 mg, Oral, Q4H PRN    melatonin tab 3 mg, 3 mg, Oral, Nightly PRN    ondansetron (Zofran) 4 MG/2ML injection 4 mg, 4 mg, Intravenous, Q6H PRN    insulin aspart (NovoLOG) 100 Units/mL FlexPen 1-10 Units, 1-10 Units, Subcutaneous, TID AC and HS    insulin degludec (Tresiba) 100  units/mL flextouch 28 Units, 28 Units, Subcutaneous, Nightly    insulin aspart (NovoLOG) 100 Units/mL FlexPen 1-68 Units, 1-68 Units, Subcutaneous, TID CC    influenza virus trivalent PF (Fluzone Trivalent) 0.5 mL IM injection (ages 6 months to 64 years) 0.5 mL, 0.5 mL, Intramuscular, Prior to discharge    hydrALAzine (Apresoline) 20 mg/mL injection 5 mg, 5 mg, Intravenous, Q4H PRN    aspirin tab 325 mg, 325 mg, Oral, Nightly    atorvastatin (Lipitor) tab 40 mg, 40 mg, Oral, Nightly    clopidogrel (Plavix) tab 75 mg, 75 mg, Oral, Nightly    ezetimibe (Zetia) tab 10 mg, 10 mg, Oral, Nightly    losartan (Cozaar) tab 100 mg, 100 mg, Oral, Nightly    amLODIPine (Norvasc) tab 5 mg, 5 mg, Oral, Daily    Intake/Output:    Intake/Output Summary (Last 24 hours) at 10/6/2024 1020  Last data filed at 10/6/2024 0900  Gross per 24 hour   Intake 480 ml   Output 700 ml   Net -220 ml      Body mass index is 25.64 kg/m².    Review of Systems  Review of Systems:   A comprehensive 10 point review of systems was completed.  Pertinent positives and negatives noted in the the HPI.       Patient Vitals for the past 24 hrs:   BP Temp Temp src Pulse Resp SpO2 Height Weight   10/06/24 0809 145/81 98 °F (36.7 °C) Oral 69 16 93 % -- --   10/06/24 0700 142/87 -- -- 69 -- -- -- --   10/06/24 0545 (!) 167/90 97.9 °F (36.6 °C) Oral 71 18 95 % -- 154 lb 1.6 oz (69.9 kg)   10/05/24 2325 137/81 97.8 °F (36.6 °C) Oral 71 16 100 % -- --   10/05/24 1940 (!) 173/96 98 °F (36.7 °C) Oral 78 18 94 % -- --   10/05/24 1709 153/81 98 °F (36.7 °C) Oral 76 16 95 % -- 157 lb 8 oz (71.4 kg)   10/05/24 1707 (!) 171/95 -- -- 78 -- 97 % -- --   10/05/24 1705 (!) 173/94 -- -- 77 -- 97 % -- --   10/05/24 1630 (!) 181/103 -- -- 75 21 99 % -- --   10/05/24 1600 (!) 163/95 -- -- 71 13 98 % -- --   10/05/24 1530 (!) 160/91 -- -- 72 15 99 % -- --   10/05/24 1500 (!) 182/100 -- -- 79 21 99 % -- --   10/05/24 1415 (!) 177/100 -- -- 79 17 100 % -- --   10/05/24 1230 (!)  177/108 -- -- 77 22 99 % -- --   10/05/24 1200 (!) 177/102 -- -- 77 20 100 % -- --   10/05/24 1130 (!) 180/105 -- -- 81 25 100 % -- --   10/05/24 1123 -- 98.6 °F (37 °C) Oral 85 25 100 % 5' 5\" (1.651 m) 164 lb (74.4 kg)     Vitals:    10/05/24 2325 10/06/24 0545 10/06/24 0700 10/06/24 0809   BP: 137/81 (!) 167/90 142/87 145/81   BP Location: Left arm Left arm  Right arm   Pulse: 71 71 69 69   Resp: 16 18  16   Temp: 97.8 °F (36.6 °C) 97.9 °F (36.6 °C)  98 °F (36.7 °C)   TempSrc: Oral Oral  Oral   SpO2: 100% 95%  93%   Weight:  154 lb 1.6 oz (69.9 kg)     Height:             Physical Exam  Constitutional:       General: He is not in acute distress.     Appearance: Normal appearance. He is not ill-appearing or diaphoretic.   HENT:      Head: Normocephalic and atraumatic.      Nose: Nose normal. No congestion or rhinorrhea.      Mouth/Throat:      Mouth: Mucous membranes are moist.      Pharynx: Oropharynx is clear. No oropharyngeal exudate or posterior oropharyngeal erythema.      Comments: Mallampati class III palate  Eyes:      Extraocular Movements: Extraocular movements intact.      Pupils: Pupils are equal, round, and reactive to light.   Cardiovascular:      Rate and Rhythm: Normal rate.      Pulses: Normal pulses.      Heart sounds: Normal heart sounds. No murmur heard.  Pulmonary:      Effort: Pulmonary effort is normal. No respiratory distress.      Breath sounds: Normal breath sounds. No wheezing or rhonchi.      Comments: Diminished breath sounds in bilateral lower lobes  Chest:      Chest wall: No tenderness.   Abdominal:      General: Abdomen is flat. Bowel sounds are normal.      Palpations: Abdomen is soft.   Musculoskeletal:         General: Normal range of motion.   Skin:     General: Skin is warm.   Neurological:      General: No focal deficit present.      Mental Status: He is alert and oriented to person, place, and time.   Psychiatric:         Mood and Affect: Mood normal.         Behavior:  Behavior normal.         Thought Content: Thought content normal.         Judgment: Judgment normal.            Lab Data Review:  Recent Labs   Lab 10/05/24  1120 10/06/24  0824   WBC 9.9 6.8   HGB 11.7* 11.3*   HCT 33.6* 33.0*   .0 214.0       Recent Labs   Lab 10/05/24  1120 10/06/24  0824   * 134*   K 5.1 5.0    105   CO2 24.0 22.0   BUN 52* 47*   CREATSERUM 3.33* 3.30*   CA 9.2 9.2   ALB 4.1  --    ALKPHO 154*  --    ALT 22  --    AST 15  --    * 112*       Recent Labs   Lab 10/06/24  0824   MG 2.0       No results found for: \"PHOS\", \"PHOSPHORUS\"     No results for input(s): \"PT\", \"INR\", \"PTT\" in the last 168 hours.    No results for input(s): \"ABGPHT\", \"LMXAZC2P\", \"SONBA5M\", \"ABGHCO3\", \"ABGBE\", \"TEMP\", \"WHITNEY\", \"SITE\", \"DEV\", \"THGB\" in the last 168 hours.    No results for input(s): \"TROP\", \"CKMB\" in the last 168 hours.    Cultures: No results found for this visit on 10/05/24.        Radiology personally reviewed:  CT CHEST (CPT=71250)    Result Date: 10/5/2024  CONCLUSION:   Moderate/large bilateral pleural effusions with adjacent areas of passive atelectasis  LOCATION:  [id number]   Dictated by (CST): Rojas Mckeon MD on 10/05/2024 at 3:22 PM     Finalized by (CST): Rojas Mckeon MD on 10/05/2024 at 3:26 PM       NM LUNG VQ VENT / PERFUSION SCAN  (CPT=78582)    Result Date: 10/5/2024  CONCLUSION:  Low probability for acute pulmonary embolus.   LOCATION:  [facility]   Dictated by (CST): Kin Amin MD on 10/05/2024 at 2:13 PM     Finalized by (CST): Kin Amin MD on 10/05/2024 at 2:13 PM       XR CHEST AP PORTABLE  (CPT=71045)    Result Date: 10/5/2024  CONCLUSION:  1. Mild interstitial opacities which may represent mild edema. 2. Bilateral pleural effusions with bilateral basilar atelectasis/infiltrates.    LOCATION:  [facility]      Dictated by (CST): Kin Amin MD on 10/05/2024 at 12:26 PM     Finalized by (CST): Kin Amin MD on 10/05/2024 at 12:27 PM         Patient  Active Problem List   Diagnosis    Vitamin D deficiency    S/P coronary artery stent placement    Hyperlipidemia with target low density lipoprotein (LDL) cholesterol less than 70 mg/dL    S/P CABG x 5    CAD (coronary artery disease), autologous vein bypass graft    Diabetic retinopathy of both eyes ([facility])    Systolic CHF ([facility])    Type 2 diabetes mellitus with hyperglycemia, with long-term current use of insulin ([facility])    Primary hypertension    Esophageal reflux    Abdominal pain, chronic, left upper quadrant    CKD stage 3 secondary to diabetes ([facility])    Fluid overload    Acute kidney injury superimposed on CKD ([facility])    Acute on chronic congestive heart failure ([facility])       Assessment:  Shortness of breath with orthopnea along with congestive heart failure, no clinical evidence of pneumonia  Bilateral pleural effusion moderate to large right greater than lef as noted on CT chest 10/5/2024 ventilation/perfusion scan low probability t: Suspect due to cardiac dysfunction  Bilateral lower lobe atelectasis in association with pleural effusion  Acute on chronic systolic CHF, elevated proBNP of 8395  Cardiomyopathy with left ventricular ejection fraction of 35 to 40% and mild to moderate diffuse hypokinesis on echocardiogram 10/6/2024.  Troponin normal range.  Pulmonary hypertension with PA systolic pressure 35 to 40 mmHg  GERD  CKD 3  Hypertension  Type 2 diabetes mellitus  Vitamin D deficiency  Hyperlipidemia        Plan:  Continue gentle diuresis  Follow-up chest x-ray in a.m.  Patient is currently on Plavix 70 cautiously evaluate for thoracentesis if with persistent pleural effusion despite diuresis  DVT prophylaxis: heparin 5000 units subcutaneous every 12 hours  GI prophylaxis: ---  Will follow for further recommendations    Thank You for allowing me to participate in this patient's care     Jacob Brown MD      Spent  35  minutes of Critical Care time (exclusive of billable procedures) in evaluation, management and  complex clinical decision making. This also included extensive discussion with the patient, family, and clinical staff for care co-ordination        Note to the patient: The 21st Century Cures Act makes medical notes like these available to patients in the interest of transparency. However, be advised that this is a medical document. It is intended as peer to peer communication. It is written in medical language and may contain abbreviations or verbiage that are unfamiliar. It may appear blunt or direct. Medical documents are intended to carry relevant information, facts as evident, and clinical opinion of the practitioner.      Disclaimer: Components of this note were documented using voice recognition system and are subject to errors not corrected at proofreading. Contact the author of this note for any clarifications

## 2024-10-06 NOTE — CONSULTS
[facility]  Report of Consultation    Roldan Mast Patient Status:  Inpatient    1960 MRN [id number]   [facility] 2NE-A Attending Michaelle Raines MD   Hosp Day # 1 PCP Say Ravi MD     Reason for Consultation:  Elmer/CKD 4    History of Present Illness:  Roldan Mast is a a(n) 64 year old male with hx of CKD 4- baseline Cr<3 , DM, HTN, CAD, HL who presents to hospital for evaluation of sob and some R sided chest discomfort.  Patient denies any n/v  No f/c  Denies any cough  No hemoptysis  No LE edema    He has received few doses of diuretics so far and has had good response-   Cr on admit ~ 3,3; lytes OK  A1C ~ 7.4  + proteinuria      History:  Past Medical History:    CAD (coronary artery disease)    Stents x 2 - once in  after MI and prior to CABG and again in .    CAD (coronary artery disease), autologous vein bypass graft    Diabetes (HCC)    DM type 2, uncontrolled, with renal complications    Essential hypertension    Hyperlipidemia    Hyperlipidemia LDL goal < 70    Hypertension    Hypertension, renal disease    Hypertensive heart/renal disease with failure    S/P CABG x 5    CABG x 5 in     Systolic CHF (HCC)     Past Surgical History:   Procedure Laterality Date    Angioplasty (coronary)  2006    stent x2    Angioplasty (coronary)  2010    stent x1    Cabg  2006    CABG x 5    Other surgical history  2007    Bilateral lens replacement    Other surgical history  2009    Lasik bilaterally    Tonsillectomy  At age 11     Family History   Problem Relation Age of Onset    Diabetes Mother         Type 2 DM on oral agents    Thyroid Disorder Mother         Thyroid history - not able to elaborate    Other (Other) Mother         See thyroid tab    Heart Disorder Maternal Grandmother         CAD    Diabetes Paternal Grandmother         Type 2 DM    Diabetes Brother         Type 2 DM on oral agents    Diabetes Daughter         Type 2 DM on oral agents    Diabetes Daughter          Type 2 DM on oral agents    Thyroid Disorder Daughter         Thyroid history - unable to elaborate    Other (Other) Daughter         See thyroid tab    Diabetes Brother         Type 2 DM on oral agents    Eye Problems Neg       reports that he has never smoked. He has never used smokeless tobacco. He reports that he does not currently use alcohol. He reports that he does not use drugs.    Allergies:  Allergies   Allergen Reactions    Adenosine     Spironolactone        Current Medications:    Current Facility-Administered Medications:     carvedilol (Coreg) tab 25 mg, 25 mg, Oral, BID with meals    insulin degludec (Tresiba) 100 units/mL flextouch 14 Units, 14 Units, Subcutaneous, Nightly    glucose (Dex4) 15 GM/59ML oral liquid 15 g, 15 g, Oral, Q15 Min PRN **OR** glucose (Glutose) 40% oral gel 15 g, 15 g, Oral, Q15 Min PRN **OR** glucose-vitamin C (Dex-4) chewable tab 4 tablet, 4 tablet, Oral, Q15 Min PRN **OR** dextrose 50% injection 50 mL, 50 mL, Intravenous, Q15 Min PRN **OR** glucose (Dex4) 15 GM/59ML oral liquid 30 g, 30 g, Oral, Q15 Min PRN **OR** glucose (Glutose) 40% oral gel 30 g, 30 g, Oral, Q15 Min PRN **OR** glucose-vitamin C (Dex-4) chewable tab 8 tablet, 8 tablet, Oral, Q15 Min PRN    heparin (Porcine) 5000 UNIT/ML injection 5,000 Units, 5,000 Units, Subcutaneous, 2 times per day    acetaminophen (Tylenol Extra Strength) tab 500 mg, 500 mg, Oral, Q4H PRN    melatonin tab 3 mg, 3 mg, Oral, Nightly PRN    ondansetron (Zofran) 4 MG/2ML injection 4 mg, 4 mg, Intravenous, Q6H PRN    insulin aspart (NovoLOG) 100 Units/mL FlexPen 1-10 Units, 1-10 Units, Subcutaneous, TID AC and HS    insulin aspart (NovoLOG) 100 Units/mL FlexPen 1-68 Units, 1-68 Units, Subcutaneous, TID CC    influenza virus trivalent PF (Fluzone Trivalent) 0.5 mL IM injection (ages 6 months to 64 years) 0.5 mL, 0.5 mL, Intramuscular, Prior to discharge    hydrALAzine (Apresoline) 20 mg/mL injection 5 mg, 5 mg, Intravenous, Q4H  PRN    aspirin tab 325 mg, 325 mg, Oral, Nightly    atorvastatin (Lipitor) tab 40 mg, 40 mg, Oral, Nightly    clopidogrel (Plavix) tab 75 mg, 75 mg, Oral, Nightly    ezetimibe (Zetia) tab 10 mg, 10 mg, Oral, Nightly    amLODIPine (Norvasc) tab 5 mg, 5 mg, Oral, Daily  Home Medications:  No current outpatient medications on file.       Review of Systems:  See HPI; A total of 12 systems reviewed and otherwise unremarkable.    Physical Exam:  Vital signs: Blood pressure 146/86, pulse 69, temperature 97.8 °F (36.6 °C), temperature source Oral, resp. rate 18, height 5' 5\" (1.651 m), weight 154 lb 1.6 oz (69.9 kg), SpO2 94%.  General: No acute distress. Alert and oriented x 3.  HEENT: Moist mucous membranes. EOM-I. PERRL  Neck: No lymphadenopathy.  No JVD. No carotid bruits.  Respiratory: Clear to auscultation bilaterally.  No wheezes. No rhonchi.  Cardiovascular: S1, S2.  Regular rate and rhythm.  No murmurs. Equal pulses   Abdomen: Soft, nontender, nondistended.  Positive bowel sounds. No rebound tenderness   Neurologic: No focal neurological deficits.   Musculoskeletal: Full range of motion of all extremities.  No swelling noted.   Integument: No lesions. No erythema.  Psychiatric: Appropriate mood and affect.    Laboratory:  Lab Results   Component Value Date    WBC 6.8 10/06/2024    HGB 11.3 10/06/2024    HCT 33.0 10/06/2024    .0 10/06/2024    CREATSERUM 3.30 10/06/2024    BUN 47 10/06/2024     10/06/2024    K 5.0 10/06/2024     10/06/2024    CO2 22.0 10/06/2024     10/06/2024    CA 9.2 10/06/2024    MG 2.0 10/06/2024    PGLU 106 10/06/2024     Lab Results   Component Value Date    COLORUR Colorless 10/05/2024    CLARITY Clear 10/05/2024    SPECGRAVITY 1.007 10/05/2024    GLUUR Normal 10/05/2024    BILUR Negative 10/05/2024    KETUR Negative 10/05/2024    BLOODURINE Trace 10/05/2024    PHURINE 6.0 10/05/2024    PROUR 50 10/05/2024    UROBILINOGEN Normal 10/05/2024    NITRITE Negative  10/05/2024    LEUUR Negative 10/05/2024       BUN (mg/dL)   Date Value   10/06/2024 47 (H)   10/05/2024 52 (H)     Blood Urea Nitrogen (mg/dL)   Date Value   08/31/2016 12   08/06/2014 17   04/15/2014 12   09/09/2013 15     UREA NITROGEN (BUN) (mg/dL)   Date Value   02/13/2023 25   09/25/2021 19     Creatinine, Serum (mg/dL)   Date Value   08/06/2014 1.01   04/15/2014 0.95   09/09/2013 0.91     CREATININE (mg/dL)   Date Value   02/13/2023 1.52 (H)   09/25/2021 1.27     Creatinine (mg/dL)   Date Value   10/06/2024 3.30 (H)   10/05/2024 3.33 (H)   08/31/2016 0.90         Imaging:  Reviewed     Impression:  JOHN/CKD 4; baseline <3; related to HTN/DM- + proteinuria.   JOHN related to possible cardi-renal syndrome vs. Could also be progression of his underlying CKD  Volume presently is OK  - monitor labs; check urine chem  - monitor UOP/weights  - continue diuretics  - cardiology possibly considering ischemia w/u  (I.e. LHC)- risk of JEANIE reviewed.   CHF- acute on chronic; known CAD; s/p intervention in past  Chest pain- trop neg x 3; VQ scan low prob  - appreciate cards  - continue diuretics  - monitor weights  Hypoxia/pleural effusions- pulm consulted; echo pending; currently better- on RA  Patient does have random Urine Pr/Cr ratio of >4 - will check 24 hr urine protein   Anemia @ goal  HTN- uncontrolled on admit-improved; continue CCB/BB/ARB for now   - monitor        Thank you for allowing me to participate in this patient's care.  Please feel free to call me with any questions or concerns.    Weston Owusu MD  10/6/2024  5:16 PM

## 2024-10-06 NOTE — PLAN OF CARE
Received pt at shift change. AxOx4. Room air. Denies pain/SOB. Vitals stable. NSR on tele.   See flowsheets for additional assessments.   Pt updated on POC. Call light within reach. All needs met at this time.     Plan:  -Echo  -Repeat troponin  -Add Coreg BID - monitor BP  -DW // I&O  -Cath?   -Renal & pulm eval    Problem: Patient/Family Goals  Goal: Patient/Family Long Term Goal  Description: Patient's Long Term Goal: go home    Interventions:  - echo, IV lasix  - See additional Care Plan goals for specific interventions  Outcome: Progressing  Goal: Patient/Family Short Term Goal  Description: Patient's Short Term Goal: feel better    Interventions:   - echo, IV lasix  - See additional Care Plan goals for specific interventions  Outcome: Progressing

## 2024-10-06 NOTE — PLAN OF CARE
Assumed care for pt at 19:30 on 10/5    A&Ox4. Denies pain or SOB. VSS on RA. NSR on tele. Continent, urine specimen sent. Up with standby.     Plan: Daily weight, I&O, iv lasix, AM labs, cards to see, echo    Call light in reach. Able to make needs known. Spouse at bedside.

## 2024-10-07 PROBLEM — Z79.4 TYPE 2 DIABETES MELLITUS WITH HYPOGLYCEMIA WITHOUT COMA, WITH LONG-TERM CURRENT USE OF INSULIN (HCC): Status: ACTIVE | Noted: 2024-10-07

## 2024-10-07 PROBLEM — E11.649 TYPE 2 DIABETES MELLITUS WITH HYPOGLYCEMIA WITHOUT COMA, WITH LONG-TERM CURRENT USE OF INSULIN (HCC): Status: ACTIVE | Noted: 2024-10-07

## 2024-10-07 LAB
ADENOVIRUS PCR:: NOT DETECTED
ANION GAP SERPL CALC-SCNC: 7 MMOL/L (ref 0–18)
ATRIAL RATE: 80 BPM
ATRIAL RATE: 83 BPM
ATRIAL RATE: 83 BPM
B PARAPERT DNA SPEC QL NAA+PROBE: NOT DETECTED
B PERT DNA SPEC QL NAA+PROBE: NOT DETECTED
BASOPHILS # BLD AUTO: 0.04 X10(3) UL (ref 0–0.2)
BASOPHILS NFR BLD AUTO: 0.5 %
BUN BLD-MCNC: 61 MG/DL (ref 9–23)
C PNEUM DNA SPEC QL NAA+PROBE: NOT DETECTED
CALCIUM BLD-MCNC: 9.6 MG/DL (ref 8.7–10.4)
CHLORIDE SERPL-SCNC: 101 MMOL/L (ref 98–112)
CO2 SERPL-SCNC: 26 MMOL/L (ref 21–32)
CORONAVIRUS 229E PCR:: NOT DETECTED
CORONAVIRUS HKU1 PCR:: NOT DETECTED
CORONAVIRUS NL63 PCR:: NOT DETECTED
CORONAVIRUS OC43 PCR:: NOT DETECTED
CREAT BLD-MCNC: 3.49 MG/DL
EGFRCR SERPLBLD CKD-EPI 2021: 19 ML/MIN/1.73M2 (ref 60–?)
EOSINOPHIL # BLD AUTO: 0.19 X10(3) UL (ref 0–0.7)
EOSINOPHIL NFR BLD AUTO: 2.1 %
ERYTHROCYTE [DISTWIDTH] IN BLOOD BY AUTOMATED COUNT: 12.6 %
FLUAV RNA SPEC QL NAA+PROBE: NOT DETECTED
FLUBV RNA SPEC QL NAA+PROBE: NOT DETECTED
GLUCOSE BLD-MCNC: 108 MG/DL (ref 70–99)
GLUCOSE BLD-MCNC: 122 MG/DL (ref 70–99)
GLUCOSE BLD-MCNC: 126 MG/DL (ref 70–99)
GLUCOSE BLD-MCNC: 76 MG/DL (ref 70–99)
GLUCOSE BLD-MCNC: 93 MG/DL (ref 70–99)
HCT VFR BLD AUTO: 36.1 %
HGB BLD-MCNC: 12.8 G/DL
IMM GRANULOCYTES # BLD AUTO: 0.02 X10(3) UL (ref 0–1)
IMM GRANULOCYTES NFR BLD: 0.2 %
LYMPHOCYTES # BLD AUTO: 2.09 X10(3) UL (ref 1–4)
LYMPHOCYTES NFR BLD AUTO: 23.6 %
M PROTEIN 24H UR ELPH-MRATE: 2127.5 MG/24 HR (ref ?–149.1)
MCH RBC QN AUTO: 28.8 PG (ref 26–34)
MCHC RBC AUTO-ENTMCNC: 35.5 G/DL (ref 31–37)
MCV RBC AUTO: 81.3 FL
METAPNEUMOVIRUS PCR:: NOT DETECTED
MONOCYTES # BLD AUTO: 0.87 X10(3) UL (ref 0.1–1)
MONOCYTES NFR BLD AUTO: 9.8 %
MYCOPLASMA PNEUMONIA PCR:: NOT DETECTED
NEUTROPHILS # BLD AUTO: 5.63 X10 (3) UL (ref 1.5–7.7)
NEUTROPHILS # BLD AUTO: 5.63 X10(3) UL (ref 1.5–7.7)
NEUTROPHILS NFR BLD AUTO: 63.8 %
OSMOLALITY SERPL CALC.SUM OF ELEC: 296 MOSM/KG (ref 275–295)
P AXIS: 17 DEGREES
P AXIS: 26 DEGREES
P AXIS: 33 DEGREES
P-R INTERVAL: 140 MS
P-R INTERVAL: 142 MS
P-R INTERVAL: 148 MS
PARAINFLUENZA 1 PCR:: NOT DETECTED
PARAINFLUENZA 2 PCR:: NOT DETECTED
PARAINFLUENZA 3 PCR:: NOT DETECTED
PARAINFLUENZA 4 PCR:: NOT DETECTED
PLATELET # BLD AUTO: 248 10(3)UL (ref 150–450)
POTASSIUM SERPL-SCNC: 4.9 MMOL/L (ref 3.5–5.1)
Q-T INTERVAL: 390 MS
Q-T INTERVAL: 406 MS
Q-T INTERVAL: 408 MS
QRS DURATION: 102 MS
QRS DURATION: 98 MS
QRS DURATION: 98 MS
QTC CALCULATION (BEZET): 458 MS
QTC CALCULATION (BEZET): 470 MS
QTC CALCULATION (BEZET): 477 MS
R AXIS: 44 DEGREES
R AXIS: 44 DEGREES
R AXIS: 47 DEGREES
RBC # BLD AUTO: 4.44 X10(6)UL
RHINOVIRUS/ENTERO PCR:: NOT DETECTED
RSV RNA SPEC QL NAA+PROBE: NOT DETECTED
SARS-COV-2 RNA NPH QL NAA+NON-PROBE: NOT DETECTED
SODIUM SERPL-SCNC: 134 MMOL/L (ref 136–145)
SPECIMEN VOL UR: 3700 ML
T AXIS: 162 DEGREES
T AXIS: 208 DEGREES
T AXIS: 217 DEGREES
VENTRICULAR RATE: 80 BPM
VENTRICULAR RATE: 83 BPM
VENTRICULAR RATE: 83 BPM
WBC # BLD AUTO: 8.8 X10(3) UL (ref 4–11)

## 2024-10-07 PROCEDURE — 99232 SBSQ HOSP IP/OBS MODERATE 35: CPT | Performed by: INTERNAL MEDICINE

## 2024-10-07 PROCEDURE — 99233 SBSQ HOSP IP/OBS HIGH 50: CPT | Performed by: INTERNAL MEDICINE

## 2024-10-07 PROCEDURE — 99231 SBSQ HOSP IP/OBS SF/LOW 25: CPT | Performed by: INTERNAL MEDICINE

## 2024-10-07 RX ORDER — INSULIN DEGLUDEC 100 U/ML
7 INJECTION, SOLUTION SUBCUTANEOUS NIGHTLY
Status: DISCONTINUED | OUTPATIENT
Start: 2024-10-07 | End: 2024-10-08

## 2024-10-07 NOTE — PLAN OF CARE
Began taking care of patient this am.  VSS.  Patient c/o sore throat, Dr. Raines made aware.  Respiratory panal negative.  Patient states overall SOB improving.  Patient has denied SOB today.  Multiple visitors today.     24 hr urine study started and specimen placed on ice. SBP <160 maintained.          Problem: Patient/Family Goals  Goal: Patient/Family Long Term Goal  Description: Patient's Long Term Goal: go home    Interventions:  - echo, IV lasix  - See additional Care Plan goals for specific interventions  10/6/2024 2312 by Annamarie Solis RN  Outcome: Progressing     Problem: CARDIOVASCULAR - ADULT  Goal: Maintains optimal cardiac output and hemodynamic stability  Description: INTERVENTIONS:  - Monitor vital signs, rhythm, and trends  - Monitor for bleeding, hypotension and signs of decreased cardiac output  - Evaluate effectiveness of vasoactive medications to optimize hemodynamic stability  - Monitor arterial and/or venous puncture sites for bleeding and/or hematoma  - Assess quality of pulses, skin color and temperature  - Assess for signs of decreased coronary artery perfusion - ex. Angina  - Evaluate fluid balance, assess for edema, trend weights  Outcome: Progressing

## 2024-10-07 NOTE — PROGRESS NOTES
[facility]  Division of Cardiology  Progress Note    Roldan Mast Patient Status:  Inpatient    1960 MRN [id number]   Location [facility] 2NE-A Attending Michaelle Raines MD   Hosp Day # 2 PCP Say Ravi MD   Assessment:  SOB/Acute on chronic systolic congestive heart failure  Bilateral pleural effusions  Troponin negative  VQ neg for PE  Echo with EF now 35-40% range (from 45% range)  CAD, PCI x 2 and CABG   Dyslipidemia  DM, insulin dependent  HTN  CKD3, follows with [facility] CHRISTINA, baseline Cr 1.5  JOHN, Cr 3.5 today from baseline 1.5  Anemia, Hgb 12.      Plan:  CHF: EF down a bit.  Although angio would be reasonable, given Cr WILL NOT ANGIO AT THIS TIME.  Likewise holding Losartan/Entresto. Holding diuresis given Cr. Continue Coreg for now.  But breathing much improved and on RA.  CAD: DAPT, Consider cath in the future if his Cr improves given his reduced EF and prior CAD/PCI.  CKD: Labs in AM, holding toxins, Cr 3.5 today.  [facility] on consult.  Lipids: Lipitor, zetia.  Goal LDL<55.  DM: Per primary team.  HTN: Last reading 160s, but otherwise acceptable.  PRN hydralazine.         Subjective:  No overnight events.  The patient denies any significant chest pain or dyspnea.  Notes his breathing is \"much better\".  Wants to go home.  Discussed his CKD and renal findings with him and family at length.    Objective:  BP (!) 166/87 (BP Location: Right arm)   Pulse 69   Temp 97.6 °F (36.4 °C) (Oral)   Resp 20   Ht 65\"   Wt 151 lb 7.3 oz (68.7 kg)   SpO2 98%   BMI 25.20 kg/m²   Temp (24hrs), Av.8 °F (36.6 °C), Min:97.6 °F (36.4 °C), Max:98.2 °F (36.8 °C)      Intake/Output Summary (Last 24 hours) at 10/7/2024 1842  Last data filed at 10/7/2024 0600  Gross per 24 hour   Intake 270 ml   Output 2100 ml   Net -1830 ml     Wt Readings from Last 3 Encounters:   10/07/24 151 lb 7.3 oz (68.7 kg)   10/05/24 160 lb (72.6 kg)   24 153 lb 8 oz (69.6 kg)        Physical exam:  General: Well developed, well nourished male.  Pt is in no acute distress.  Neck:  No JVD.  Supple with normal ROM.  Cardiac: Regular rate and rhythm.  No murmurs, rubs, or gallops.   Lungs: Clear to ascultation bilaterally.    Abdomen: Soft.  Non-distended.  Non-tender.  Bowel sounds are present.  No guarding or rebound.   Extremities: Warm, no significant edema.  Palpable pulses.  Neurologic: Alert and oriented, normal affect.  No gross deficit appreciated.  Integument:  No visible rashes are appreciated.  Psych: The affect is appropriate.    Meds:    insulin degludec  7 Units Subcutaneous Nightly    carvedilol  25 mg Oral BID with meals    [Held by provider] furosemide  40 mg Intravenous Daily    heparin  5,000 Units Subcutaneous 2 times per day    insulin aspart  1-10 Units Subcutaneous TID AC and HS    insulin aspart  1-68 Units Subcutaneous TID CC    aspirin  325 mg Oral Nightly    atorvastatin  40 mg Oral Nightly    clopidogrel  75 mg Oral Nightly    ezetimibe  10 mg Oral Nightly    amLODIPine  5 mg Oral Daily       Laboratory Data:  Lab Results   Component Value Date     10/07/2024    K 4.9 10/07/2024     10/07/2024    CO2 26.0 10/07/2024    BUN 61 10/07/2024    CREATSERUM 3.49 10/07/2024     10/07/2024    CA 9.6 10/07/2024     Lab Results   Component Value Date    WBC 8.8 10/07/2024    HGB 12.8 10/07/2024    HCT 36.1 10/07/2024    .0 10/07/2024     Lab Results   Component Value Date    INR 1.0 09/10/2012       Telemetry: Sinus.  Unremarkable.        Thank you for allowing our group to care for your patient. Please contact me with any questions.   Denver Benitez MD  [phone]

## 2024-10-07 NOTE — PROGRESS NOTES
[facility]  Progress Note    Roldan Mast Patient Status:  Inpatient    1960 MRN [id number]   Location [facility] 2NE-A Attending Michaelle Raines MD   Hosp Day # 2 PCP Say Ravi MD     Subjective:  Roldan Mast is a(n) 64 year old male remains afebrile  States he feels much better today much less short of breath is able to be up in the chair  Orthopnea has essentially resolved    Objective:  BP (!) 166/87 (BP Location: Right arm)   Pulse 69   Temp 97.6 °F (36.4 °C) (Oral)   Resp 20   Ht 5' 5\" (1.651 m)   Wt 151 lb 7.3 oz (68.7 kg)   SpO2 98%   BMI 25.20 kg/m² remains on room air      Temp (24hrs), Av.8 °F (36.6 °C), Min:97.6 °F (36.4 °C), Max:98.2 °F (36.8 °C)      Intake/Output:    Intake/Output Summary (Last 24 hours) at 10/7/2024 1802  Last data filed at 10/7/2024 0600  Gross per 24 hour   Intake 270 ml   Output 2100 ml   Net -1830 ml       Physical Exam:   General: alert, cooperative, oriented.  No respiratory distress.   Head: Normocephalic, without obvious abnormality, atraumatic.   Throat: Lips, mucosa, and tongue normal.  No thrush noted.   Neck: trachea midline, no adenopathy, no thyromegaly. No JVD.  90 degrees   Lungs: Bilateral dullness with rales above good air entry   Chest wall: No tenderness or deformity.   Heart:    Abdomen: soft, non-distended, no masses, no guarding, no     Rebound.  No obvious ascites   Extremity: Thin nontender   Skin: No rashes or lesions.   Neurological: Alert, interactive, no focal deficits    Lab Data Review:  Recent Labs     10/05/24  1120 10/06/24  0824 10/07/24  0938   WBC 9.9 6.8 8.8   HGB 11.7* 11.3* 12.8*   .0 214.0 248.0     Recent Labs     10/05/24  1120 10/06/24  0824 10/07/24  0938   * 134* 134*   K 5.1 5.0 4.9    105 101   CO2 24.0 22.0 26.0   BUN 52* 47* 61*   CREATSERUM 3.33* 3.30* 3.49*     No results for input(s): \"PTP\", \"INR\", \"PTT\" in the last 168 hours.    Cultures: Negative viral  studies    Radiology:  No results found.  CT reviewed bilateral effusions      Medications reviewed     Assessment and Plan:   Patient Active Problem List   Diagnosis    Vitamin D deficiency    S/P coronary artery stent placement    Hyperlipidemia with target low density lipoprotein (LDL) cholesterol less than 70 mg/dL    S/P CABG x 5    CAD (coronary artery disease), autologous vein bypass graft    Diabetic retinopathy of both eyes ([facility])    HFrEF (heart failure with reduced ejection fraction) ([facility])    Type 2 diabetes mellitus with hyperglycemia, with long-term current use of insulin ([facility])    Primary hypertension    Esophageal reflux    Abdominal pain, chronic, left upper quadrant    CKD stage 3 secondary to diabetes ([facility])    Fluid overload    Acute kidney injury superimposed on CKD ([facility])    Acute on chronic congestive heart failure ([facility])    CKD (chronic kidney disease) stage 4, GFR 15-29 ml/min ([facility])    Hypoxia    Pleural effusion    Anemia, chronic disease    JOHN (acute kidney injury) ([facility])    Type 2 diabetes mellitus with hypoglycemia without coma, with long-term current use of insulin ([facility])       Assessment:  Dyspnea now improved with diuretics  Bilateral pleural effusion moderate to large right greater than lef as noted on CT chest 10/5/2024 ventilation/perfusion scan low probability t: Suspect due to cardiac dysfunction  Bilateral lower lobe atelectasis in association with pleural effusion  Acute on chronic systolic CHF   Cardiomyopathy with left ventricular ejection fraction of 35 to 40% and mild to moderate diffuse hypokinesis on echocardiogram 10/6/2024.  Troponin normal range.  Pulmonary hypertension with PA systolic pressure 35 to 40mmHg  CKD 4    Plan:  Recheck chest x-ray  Management as per cardiology service  To follow    CC     Renetta Palacios MD  10/7/2024  6:02 PM

## 2024-10-07 NOTE — PAYOR COMM NOTE
--------------  ADMISSION REVIEW     Payor: [employer]  Subscriber #:  [id number]  Authorization Number: [id number]    Admit date: 10/5/24  Admit time:  4:55 PM       REVIEW DOCUMENTATION:     ED Provider Notes        ED Provider Notes signed by Macie English DO at 10/5/2024 10:06 PM       Author: Macie English DO Service: -- Author Type: Physician    Filed: 10/5/2024 10:06 PM Date of Service: 10/5/2024  9:50 PM Status: Addendum    : Macie English DO (Physician)    Related Notes: Original Note by Macie English DO (Physician) filed at 10/5/2024 10:06 PM           Patient Seen in: [facility]      History     Chief Complaint   Patient presents with    Chest Pain Angina     Stated Complaint: Chest pain    Subjective:   HPI  Patient is a 63 yo M with a history of CAD s/p CABG, CHF (EF 35-40%), HTN who presents to ED for evaluation of SOB over past 3-4 days associated with orthopnea. SOB improves sitting upright. Patient also notes he has some intermittent chest pressure with bilateral shoulder pain. He has also had associated R sided thoracic back pain which he has been putting lido patches on with improvement. Pt denies any fevers, cough, hemoptysis, abd pain, leg swelling, vomiting. Pt went to IC who referred him to ED. Pt received ASA with EMS. He is not on any blood thinners and denies any history of PE, recent travel or surgeries.     Cardiology clinic note on 8/12/2024 reviewed with impression/plan below:  \"1. Chronic congestive heart failure due to systolic dysfunction/cardiorenal syndrome, chronic kidney disease. The patient is unable to afford Entresto or other items and GDMT. He went back to losartan. The patient is still symptomatic. Limited options due to the presence of cardiorenal syndrome. We will refer the patient to advanced heart failure program, i.e., [place].  2. Chronic kidney disease. The patient was seen by a nephrologist.  3. Hypertension. Blood pressure does not appear to  be well controlled. We will add clonidine.  4. Ischemic cardiomyopathy. Mild mitral regurgitation by echo. Left ventricular ejection fraction 35-40%.  5. History of coronary artery disease, status, status post myocardial infarction, status post stent x2 in 2006 and 2010, status post coronary artery bypass grafting x5 in 2006 at [facility]. Nuclear stress test 05/20/2024, left ventricular ejection fraction 40% with fixed inferior defect, no ischemia.  6. Dyslipidemia. Continue statin therapy.  7. Diabetes mellitus. Management as per primary care physician.  8. History of thyroid disorder.\"        Objective:     Past Medical History:    CAD (coronary artery disease)    Stents x 2 - once in 2006 after MI and prior to CABG and again in 2010.    CAD (coronary artery disease), autologous vein bypass graft    Diabetes (HCC)    DM type 2, uncontrolled, with renal complications    Essential hypertension    Hyperlipidemia    Hyperlipidemia LDL goal < 70    Hypertension    Hypertension, renal disease    Hypertensive heart/renal disease with failure    S/P CABG x 5    CABG x 5 in 2006    Systolic CHF (HCC)              Past Surgical History:   Procedure Laterality Date    Angioplasty (coronary)  2006    stent x2    Angioplasty (coronary)  2010    stent x1    Cabg  2006    CABG x 5    Other surgical history  2007    Bilateral lens replacement    Other surgical history  2009    Lasik bilaterally    Tonsillectomy  At age 11                Social History     Socioeconomic History    Marital status:     Number of children: 4   Occupational History    Occupation: Auto repair   Tobacco Use    Smoking status: Never    Smokeless tobacco: Never   Vaping Use    Vaping status: Never Used   Substance and Sexual Activity    Alcohol use: Not Currently     Comment: occ    Drug use: No    Sexual activity: Not Currently   Other Topics Concern    Caffeine Concern Yes     Comment: 2-3 cups of coffee weekly    Exercise No    Seat  Belt Yes     Social Determinants of Health     Food Insecurity: No Food Insecurity (10/5/2024)    Food Insecurity     Food Insecurity: Never true   Transportation Needs: No Transportation Needs (10/5/2024)    Transportation Needs     Lack of Transportation: No   Physical Activity: Inactive (7/20/2020)    Received from [facility]    Exercise Vital Sign     Days of Exercise per Week: 0 days     Minutes of Exercise per Session: 0 min   Housing Stability: Low Risk  (10/5/2024)    Housing Stability     Housing Instability: No                  Physical Exam     ED Triage Vitals   BP 10/05/24 1130 (!) 180/105   Pulse 10/05/24 1123 85   Resp 10/05/24 1123 25   Temp 10/05/24 1123 98.6 °F (37 °C)   Temp src 10/05/24 1123 Oral   SpO2 10/05/24 1123 100 %   O2 Device 10/05/24 1123 None (Room air)       Current Vitals:   Vital Signs  BP: (!) 173/96  Pulse: 78  Resp: 18  Temp: 98 °F (36.7 °C)  Temp src: Oral  MAP (mmHg): (!) 118    Oxygen Therapy  SpO2: 94 %  O2 Device: None (Room air)  Pulse Oximetry Type: Continuous  Oximetry Probe Site Changed: No  Pulse Ox Probe Location: Right hand        Physical Exam  Vitals and nursing note reviewed.   Constitutional:       General: He is not in acute distress.  HENT:      Head: Normocephalic and atraumatic.      Nose: Nose normal.      Mouth/Throat:      Mouth: Mucous membranes are moist.   Eyes:      Extraocular Movements: Extraocular movements intact.      Pupils: Pupils are equal, round, and reactive to light.   Cardiovascular:      Rate and Rhythm: Normal rate and regular rhythm.      Pulses: Normal pulses.   Pulmonary:      Effort: Pulmonary effort is normal. No respiratory distress.      Breath sounds: Examination of the right-lower field reveals rales. Examination of the left-lower field reveals rales. Rales present. No wheezing.   Chest:      Comments: Chest wall scar with surrounding ecchymoses and TTP  Abdominal:      General: There is no  distension.      Palpations: Abdomen is soft.   Musculoskeletal:         General: No swelling. Normal range of motion.      Cervical back: Normal range of motion.      Comments: TTP of R thoracic region   Skin:     General: Skin is warm and dry.      Capillary Refill: Capillary refill takes less than 2 seconds.   Neurological:      General: No focal deficit present.      Mental Status: He is alert.   Psychiatric:         Mood and Affect: Mood normal.             ED Course     Labs Reviewed   CBC WITH DIFFERENTIAL WITH PLATELET - Abnormal; Notable for the following components:       Result Value    RBC 4.08 (*)     HGB 11.7 (*)     HCT 33.6 (*)     All other components within normal limits   COMP METABOLIC PANEL (14) - Abnormal; Notable for the following components:    Glucose 218 (*)     Sodium 134 (*)     BUN 52 (*)     Creatinine 3.33 (*)     Calculated Osmolality 299 (*)     eGFR-Cr 20 (*)     Alkaline Phosphatase 154 (*)     All other components within normal limits   PRO BETA NATRIURETIC PEPTIDE - Abnormal; Notable for the following components:    Pro-Beta Natriuretic Peptide 8,393 (*)     All other components within normal limits   D-DIMER - Abnormal; Notable for the following components:    D-Dimer 1.54 (*)     All other components within normal limits   HEMOGLOBIN A1C - Abnormal; Notable for the following components:    HgbA1C 7.4 (*)     Estimated Average Glucose 166 (*)     All other components within normal limits   POCT GLUCOSE - Abnormal; Notable for the following components:    POC Glucose 172 (*)     All other components within normal limits   POCT GLUCOSE - Abnormal; Notable for the following components:    POC Glucose 134 (*)     All other components within normal limits   TROPONIN I HIGH SENSITIVITY - Normal   TROPONIN I HIGH SENSITIVITY - Normal   URIC ACID - Normal   SARS-COV-2/FLU A AND B/RSV BY PCR (GENEXPERT) - Normal    Narrative:     This test is intended for the qualitative detection and  differentiation of SARS-CoV-2, influenza A, influenza B, and respiratory syncytial virus (RSV) viral RNA in nasopharyngeal or nares swabs from individuals suspected of respiratory viral infection consistent with COVID-19 by their healthcare provider. Signs and symptoms of respiratory viral infection due to SARS-CoV-2, influenza, and RSV can be similar.    Test performed using the Xpert Xpress SARS-CoV-2/FLU/RSV (real time RT-PCR)  assay on the GeneXpert instrument, [employer], [place] [address].   This test is being used under the Food and Drug Administration's Emergency Use Authorization.    The authorized Fact Sheet for Healthcare Providers for this assay is available upon request from the laboratory.   SODIUM, URINE, RANDOM   URINALYSIS, ROUTINE   URINE PROTEIN/CREATININE RATIO, RANDOM   RAINBOW DRAW LAVENDER   RAINBOW DRAW LIGHT GREEN   RAINBOW DRAW BLUE     EKG #1    Rate, intervals and axes as noted on EKG Report.  Rate: 83  Rhythm: Sinus Rhythm  Reading: NSR with ventricular rate of 83, TWI in lateral leads, no acute ST elevations, similar to prior EKG.     EKG #2    Rate, intervals and axes as noted on EKG Report.  Rate: 80  Rhythm: Sinus Rhythm  Reading: NSR with ventricular rate of 83, TWI in lateral leads, no acute ST elevations, similar to prior EKG.               Independent interpretation of imaging : CXR and noted pulmoanry edema, bilateral pleural effusions        MDM      Patient is a 63 yo M with a history of CAD s/p CABG, CHF (EF 35-40%), HTN who presents to ED for evaluation of SOB over past 3-4 days associated with orthopnea.Pt arrives to ED afebrile, hypertensive, satting well on RA. Pt did not take home BP meds last night because of his symptoms. States his SBP is typically between 180-190. Will plan for labs, CXR, and discuss with cardiology.     ED Course:   - CBC shows no leukoctyosis, hgb 11.7  - CMP significant for JOHN with creatinine of 3.33 which is increased from his baseline  - Trop  negative x2. BNP 8,393, CXR shows fluid overload and bilateral pleural effusions  - D-dimer elevated. VQ scan showed low probability of PE.   - CT chest obtained without contrast to better evaluate lungs which showed bilateral effusions. Discussed with cardiology who recommended IV lasix 40 mg. Pt's BP improved with home meds and IV labetalol. Pt felt better while in ED. Admitted to [facility].       Records from previous encounters were reviewed from : cardiology clinic and It was noted that pt has CHF due to systolic dysfunction/cardiorenal syndrome, CKD. Pt unable to afford entresto or other items on GDMT and was symptomatic at this time. Referred to advanced heart failure clinic. HTN not controlled and clonidine was added. Pt has EF of 35-40%.   Differential diagnosis included : CHF exacerbation, ACS, PE, pleural effusion, pneumonia    Management of patient was discussed with : hospitalist, cardiologist, and patient family      Hospitalization was considered : yes  Admission disposition: 10/5/2024 10:06 PM           Medical Decision Making      Disposition and Plan     Clinical Impression:  1. Acute on chronic congestive heart failure, unspecified heart failure type (HCC)    2. Bilateral pleural effusion    3. Chest pressure    4. Hypertensive urgency    5. JOHN (acute kidney injury) ([facility])         Disposition:  Admit  10/5/2024 10:06 pm    Follow-up:  No follow-up provider specified.        Medications Prescribed:  Current Discharge Medication List              Supplementary Documentation:         Hospital Problems       Present on Admission  Date Reviewed: 6/27/2024            ICD-10-CM Noted POA    Acute kidney injury superimposed on CKD ([facility]) N17.9, N18.9 10/5/2024 Yes    Acute on chronic congestive heart failure ([facility]) I50.9 10/5/2024 Unknown    CAD (coronary artery disease), autologous vein bypass graft I25.810 5/13/2014 Yes    CKD stage 3 secondary to diabetes ([facility]) E11.22, N18.30 4/28/2023 Yes     Fluid overload E87.70 10/5/2024 Unknown    Hyperlipidemia with target low density lipoprotein (LDL) cholesterol less than 70 mg/dL E78.5 Unknown Yes    Primary hypertension I10 10/5/2021 Yes    S/P CABG x 5 Z95.1 Unknown Yes    Overview Addendum 12/3/2012  7:09 PM by Aga Bee     CABG x 5 in 2006         S/P coronary artery stent placement Z95.5 6/14/2012 Yes    Overview Signed 12/3/2012  7:10 PM by Aga Bee     Stents x 2 - once in 2006 after MI and prior to CABG and again in 2010.         Type 2 diabetes mellitus with hyperglycemia, with long-term current use of insulin (HCC) E11.65, Z79.4 10/5/2021 Yes                                                      Signed by Macie English DO on 10/5/2024 10:06 PM         MEDICATIONS ADMINISTERED IN LAST 1 DAY:  amLODIPine (Norvasc) tab 5 mg       Date Action Dose Route User    10/7/2024 0850 Given 5 mg Oral Antonette Chatterjee RN          aspirin tab 325 mg       Date Action Dose Route User    10/6/2024 2038 Given 325 mg Oral Oca, ANEESH De Luna          atorvastatin (Lipitor) tab 40 mg       Date Action Dose Route User    10/6/2024 2038 Given 40 mg Oral Oca, ANEESH De Luna          carvedilol (Coreg) tab 25 mg       Date Action Dose Route User    10/7/2024 0850 Given 25 mg Oral Antonette Chatterjee RN    10/6/2024 1718 Given 25 mg Oral Shilpa Centeno RN          clopidogrel (Plavix) tab 75 mg       Date Action Dose Route User    10/6/2024 2038 Given 75 mg Oral Oca, ANEESH De Luna          ezetimibe (Zetia) tab 10 mg       Date Action Dose Route User    10/6/2024 2038 Given 10 mg Oral OcAnnamarie king RN          furosemide (Lasix) 10 mg/mL injection 40 mg       Date Action Dose Route User    10/7/2024 0850 Given 40 mg Intravenous Antonette Chatterjee RN    10/6/2024 1756 Given 40 mg Intravenous Shilpa Centeno RN          heparin (Porcine) 5000 UNIT/ML injection 5,000 Units       Date Action Dose Route User    10/7/2024 0850 Given 5,000 Units Subcutaneous (Left Lower  Abdomen) Antonette Chatterjee RN    10/6/2024 2039 Given 5,000 Units Subcutaneous (Right Lower Abdomen) Annamarie Solis RN          insulin aspart (NovoLOG) 100 Units/mL FlexPen 1-68 Units       Date Action Dose Route User    10/7/2024 0851 Given 2 Units Subcutaneous (Right Upper Abdomen) Antonette Chatterjee RN    10/6/2024 1756 Given 2 Units Subcutaneous (Right Upper Arm) Shilpa Centeno RN          insulin degludec (Tresiba) 100 units/mL flextouch 14 Units       Date Action Dose Route User    10/6/2024 2038 Given 14 Units Subcutaneous (Right Upper Arm) Annamarie Solis RN            Vitals (last day)       Date/Time Temp Pulse Resp BP SpO2 Weight O2 Device O2 Flow Rate (L/min) [facility]    10/07/24 0802 97.6 °F (36.4 °C) 69 20 166/87 98 % -- None (Room air) -- LS    10/07/24 0510 97.6 °F (36.4 °C) 71 16 157/88 96 % 151 lb 7.3 oz (68.7 kg) None (Room air) 0 L/min     10/06/24 2300 98.2 °F (36.8 °C) -- 15 -- -- -- None (Room air) 0 L/min     10/06/24 2300 -- 71 -- 131/70 94 % -- -- -- LINDA    10/06/24 1855 97.9 °F (36.6 °C) 77 16 144/77 95 % -- None (Room air) 0 L/min     10/06/24 1536 97.8 °F (36.6 °C) 69 18 146/86 94 % -- None (Room air) -- CM    10/06/24 1300 98 °F (36.7 °C) 68 16 143/86 95 % -- None (Room air) -- SH    10/06/24 0809 98 °F (36.7 °C) 69 16 145/81 93 % -- None (Room air) -- CM    10/06/24 0700 -- 69 -- 142/87 -- -- -- -- JG    10/06/24 0545 97.9 °F (36.6 °C) 71 18 167/90 95 % 154 lb 1.6 oz (69.9 kg) None (Room air) 0 L/min          H&P    Chief Complaint: Shortness of breath     History of Present Illness:  Roldan Mast is a 64 year old male admitted with shortness of breath.  Started few days back according to patient.  Worse when lying down.  Complains of chest pressure radiating to back and shoulder.  Denies any fever or chills.  Denies any cold or cough symptoms.  Patient seen with patient spouse and patient's daughters at bedside.  Patient went to the immediate care in [place] today for the  symptoms and was sent to the emergency room.  Denies any abdominal pain.  Denies any nausea vomiting or diarrhea.  Patient has history of previous CAD with previous CABG and has had previous stent.      ASSESSMENT / PLAN:      #Acute on chronic CHF exacerbation, with exertional shortness of breath and chest discomfort and bilateral pleural effusion  #Bilateral pleural effusion with atelectasis  CHF protocol  IV Lasix given from the emergency room, repeat BMP in a.m. before adding more diuretics as kidney function test already elevated to 3.33  Daily weight  I/O chart  proBNP elevated on admission  Troponin negative x 2  VQ scan with low probability of PE  CT chest without contrast showed moderate bilateral pleural effusion with adjacent areas of passive atelectasis  Incentive spirometry  Check 2D echo  Cardiology consult  Bilateral large pleural effusion-pulmonary consult in case needs thoracentesis for the large pulmonary effusion if unresponsive to diuresis.  Diuresis limited by patient's elevated kidney function test.  #Acute kidney injury on chronic kidney disease stage III  On chart review patient had a creatinine of 1.52 on GFR of 52 on 2/13/2023.  Creatinine of 3.33 with a GFR of 28 today on admission  Follow BMP in a.m.  Nephrology consult  #Anemia of chronic kidney disease  Check iron studies  Follow CBC in a.m.  #Diabetes type 2 with hyperglycemia and CKD stage III  Hyperglycemia protocol  Follow Accu-Cheks  Tresiba long-acting insulin at a lower dose than at home as kidney function test elevated  NovoLog carb counting and correction factor  #Hypertension with hypertensive urgency on admission  Continue home Norvasc, Coreg, losartan  Blood pressure not controlled at home either according to patient and patient's family at bedside  Patient received IV labetalol from the emergency room along with IV Lasix  Will give IV hydralazine as needed  Will adjust blood pressure medications in  hospital  #Hyperlipidemia  Continue home statin and Zetia  #CAD with previous CABG and stents  Monitor on telemetry  Aspirin, statin, Coreg, Plavix  Cardiology consult    10/6 CARDIOLOGY CONSULT NOTE  Chief Complaint:   Chest pain and SHAFER     History of Present Illness:   Patient is 64 year old male with a history of CAD s/p CABG, CHF (EF 35-40%),  and HTN who has the complaint of left-sided chest pain, along with weakness and shortness of breath.  Over the past few days, these issues have gotten progressively worse.  SOB is worse when lying flat.  Patient went to the immediate care in [place] today for the symptoms and was sent to the emergency room via 911     The patient follows with Dr Duran in [place], and he was last seen in August.     He was given 40mg IV lasix in the ER and notes his symptoms have improved significantly.     ED Course:   - CBC shows no leukoctyosis, hgb 11.7  - CMP significant for JOHN with creatinine of 3.33 which is increased from his baseline  - Trop negative x2. BNP 8,393, CXR shows fluid overload and bilateral pleural effusions  - D-dimer elevated. VQ scan showed low probability of PE.   - CT chest obtained without contrast to better evaluate lungs which showed bilateral effusions. Discussed with cardiology who recommended IV lasix 40 mg. Pt's BP improved with home meds and IV labetalol. Pt felt better while in ED. Admitted to [facility].      Labs:         Recent Labs   Lab 10/05/24  1120 10/06/24  0824   * 112*   BUN 52* 47*   CREATSERUM 3.33* 3.30*   CA 9.2 9.2   ALB 4.1  --    * 134*   K 5.1 5.0    105   CO2 24.0 22.0   ALKPHO 154*  --    AST 15  --    ALT 22  --    BILT 0.4  --    TP 7.2  --            Recent Labs   Lab 10/05/24  1120 10/06/24  0824   RBC 4.08* 3.93*   HGB 11.7* 11.3*   HCT 33.6* 33.0*   MCV 82.4 84.0   MCH 28.7 28.8   MCHC 34.8 34.2   RDW 12.7 12.6   NEPRELIM 6.59 4.19   WBC 9.9 6.8   .0 214.0            Assessment:  ASSESSMENT:  Acute on chronic systolic congestive heart failure (HCC) ischemic  Bilateral pleural effusion (small present in May)   Trp neg x 2   VQ neg PE  Out pt wt 154 lbs     CAD   S/P coronary artery stent placement  Stents x 2 - once in 2006 after MI and prior to CABG and again in 2010.   CAD (coronary artery disease), autologous vein bypass graft 5/13/2014      Hyperlipidemia with target low density lipoprotein (LDL) cholesterol less than 70 mg/dL  Type 2 diabetes mellitus with hyperglycemia, with long-term current use of insulin ([facility])  Primary hypertension, needs better control  CKD stage 3 secondary to diabetes ([facility])  Acute kidney injury superimposed on CKD ([facility])  Baseline 1.5  Cr currently 3.3   Anemia Hgb 12.      PLAN:  Echo today   Consider cath in the future if his Cr improves given his reduced EF and prior CAD/PCI.  Repeat labs   \"GDMT\" if possible, hold ARB and no Entresto for now  Nephrology consult ([facility])  Increase coreg for improved BP control (pt c/o dizziness with bid dosing)      10/6 PULMONARY CONSULT NOTE    Reason for Consultation:  Shortness of breath      History of Present Illness:  Roldan Mast is a a(n) 64 year old male.  Non-smoker with history of coronary artery disease, diabetes mellitus, hypertension, hyperlipidemia, history of systolic CHF and coronary artery bypass surgery in 2006.  The patient presented to the emergency department on 10/5/2024 with shortness of breath for last several days.  He reported dyspnea on lying down.  He also reported chest pain on left side CT chest shows moderate pleural effusion so pulmonary consultation was obtained.     The patient admits to persistent shortness of breath with orthopnea although better than yesterday.  He is currently on room air.  Denies any cough, sputum production, chest pains, fever, chills, swallowing difficulty, abdominal pain diarrhea or constipation.         Assessment:  Shortness of breath with  orthopnea along with congestive heart failure, no clinical evidence of pneumonia  Bilateral pleural effusion moderate to large right greater than lef as noted on CT chest 10/5/2024 ventilation/perfusion scan low probability t: Suspect due to cardiac dysfunction  Bilateral lower lobe atelectasis in association with pleural effusion  Acute on chronic systolic CHF, elevated proBNP of 8395  Cardiomyopathy with left ventricular ejection fraction of 35 to 40% and mild to moderate diffuse hypokinesis on echocardiogram 10/6/2024.  Troponin normal range.  Pulmonary hypertension with PA systolic pressure 35 to 40 mmHg  GERD  CKD 3  Hypertension  Type 2 diabetes mellitus  Vitamin D deficiency  Hyperlipidemia           Plan:  Continue gentle diuresis  Follow-up chest x-ray in a.m.  Patient is currently on Plavix 70 cautiously evaluate for thoracentesis if with persistent pleural effusion despite diuresis  DVT prophylaxis: heparin 5000 units subcutaneous every 12 hours  GI prophylaxis: ---  Will follow for further recommendations      10/6 HOSPITALIST NOTE    Subjective:  Patient feels better today.  Shortness of breath improving.  Denies any chest pain.  No nausea.  Cannot lie down flat now according to patient.  Patient seen with nephrologist at bedside and also with patient's multiple family members including patient's wife and patient's daughters at bedside.         Assessment & Plan:    #Acute on chronic CHF exacerbation, with exertional shortness of breath and chest discomfort and bilateral pleural effusion  #Bilateral pleural effusion with atelectasis  CHF protocol  IV Lasix given from the emergency room, with plans to repeat BMP in a.m. before adding more diuretics as kidney function test  elevated to 3.33 on admission.  Creatinine 3.3 today.  Discussed with nephrology, diuresis per nephrology and cardiology.  Patient looks more euvolemic at this time per nephrology today  Daily weight  I/O chart  proBNP elevated on  admission  Troponin negative x 2  VQ scan with low probability of PE  CT chest without contrast showed moderate bilateral pleural effusion with adjacent areas of passive atelectasis  Incentive spirometry   2D echo pending  Cardiology consult  Symptoms improving today  Bilateral large pleural effusion-pulmonary consult in case needs thoracentesis for the large pulmonary effusion if unresponsive to diuresis.  Diuresis limited by patient's elevated kidney function test.  #Acute kidney injury on chronic kidney disease stage III versus chronic kidney disease stage IV per nephrology  On chart review patient had a creatinine of 1.52 on GFR of 52 on 2/13/2023.  Creatinine of 3.33 with a GFR of 28 on admission  Follow BMP in a.m.  Nephrology consult  #Anemia of chronic kidney disease  Check iron studies  Follow CBC in a.m.  #Diabetes type 2 with hyperglycemia and hypoglycemia and CKD stage III  Hyperglycemia and hypoglycemia protocol  Follow Accu-Cheks  Tresiba long-acting insulin at a lower dose than at home as kidney function test elevated-patient usually takes long-acting insulin 28 units twice daily at home and was only started on 28 units once daily on 10/5/2024 on admission to hospital and blood sugar still noted to be on lower range ranging from 69-1 72 in hospital.    Decreased Tresiba to half the dose to 14 units daily on 10/6/2024  Discussed with patient and family regarding lower insulin needs with progressive kidney disease with worsening kidney function test and need to closely monitor blood sugar and follow-up with regular outpatient primary care physician  as outpatient also.  Will continue to monitor blood sugars and labs in hospital  NovoLog carb counting and correction factor  #Hypertension with hypertensive urgency on admission  Continue home Norvasc, Coreg   No losartan at this time per cardiology due to elevated kidney function test  Blood pressure not controlled at home either according to patient and  patient's family at bedside  Patient received IV labetalol from the emergency room along with IV Lasix   IV hydralazine as needed  Will adjust blood pressure medications in hospital  #Hyperlipidemia  Continue home statin and Zetia  #CAD with previous CABG and stents  Monitor on telemetry  Aspirin, statin, Coreg, Plavix  Cardiology consult, cardiology contemplating cath in future if kidney function test improves given patient's reduced ejection fraction and prior CAD/PCI.     10/6 NEPHROLOGY CONSULT NOTE    Reason for Consultation:  John/CKD 4     History of Present Illness:  Roldan Mast is a a(n) 64 year old male with hx of CKD 4- baseline Cr<3 , DM, HTN, CAD, HL who presents to hospital for evaluation of sob and some R sided chest discomfort.  Patient denies any n/v  No f/c  Denies any cough  No hemoptysis  No LE edema     He has received few doses of diuretics so far and has had good response-   Cr on admit ~ 3,3; lytes OK  A1C ~ 7.4  + proteinuria      Impression:  JOHN/CKD 4; baseline <3; related to HTN/DM- + proteinuria.   JOHN related to possible cardi-renal syndrome vs. Could also be progression of his underlying CKD  Volume presently is OK  - monitor labs; check urine chem  - monitor UOP/weights  - continue diuretics  - cardiology possibly considering ischemia w/u  (I.e. LHC)- risk of JEANIE reviewed.   CHF- acute on chronic; known CAD; s/p intervention in past  Chest pain- trop neg x 3; VQ scan low prob  - appreciate cards  - continue diuretics  - monitor weights  Hypoxia/pleural effusions- pulm consulted; echo pending; currently better- on RA  Patient does have random Urine Pr/Cr ratio of >4 - will check 24 hr urine protein   Anemia @ goal  HTN- uncontrolled on admit-improved; continue CCB/BB/ARB for now   - monitor     10/7 HOSPITALIST NOTE    Subjective:  Patient states he had some sore throat and congestion and was having difficulty bringing up phlegm around 2 AM this morning.  Also had some itchy  scratchy eyes and tearing.  Shortness of breath improving.  Denies any chest pain.  No nausea.   Patient seen  with patient's multiple family members including patient's wife and patient's daughter at bedside.  Patient states he wears a continuous glucose monitor and noticed that his blood sugar was 66 around 4 AM and took some juice by himself at that time and did not report to the RN according to patient and patient's wife at bedside.  Discussed with patient and family regarding communicating with RN regarding any such episodes.  Accu-Cheks noted by RN this morning around 5:07 AM was 93, but patient states this was after the juice he took.     Vital signs:  Temp:  [97.6 °F (36.4 °C)-98.2 °F (36.8 °C)] 97.6 °F (36.4 °C)  Pulse:  [68-77] 69  Resp:  [15-20] 20  BP: (131-166)/(70-88) 166/87  SpO2:  [94 %-98 %] 98 %         Assessment & Plan:    #Acute on chronic CHF exacerbation, with exertional shortness of breath and chest discomfort and bilateral pleural effusion  #Bilateral pleural effusion with atelectasis  CHF protocol  IV Lasix given from the emergency room, with plans to repeat BMP in a.m. before adding more diuretics as kidney function test  elevated to 3.33 on admission.  Creatinine 3.3 today.  Discussed with nephrology, diuresis per nephrology and cardiology.  Patient looks more euvolemic at this time per nephrology today  Daily weight  I/O chart  proBNP elevated on admission  Troponin negative x 2  VQ scan with low probability of PE  CT chest without contrast showed moderate bilateral pleural effusion with adjacent areas of passive atelectasis  Incentive spirometry   2D echo done on 10/6/2024 showed EF of 35 to 40%.  Mild to moderate diffuse hypokinesis.  Hypokinesis of inferior wall.  Grade 1 diastolic dysfunction.  Cardiology consult  Symptoms improving today  Bilateral large pleural effusion-pulmonary consult in case needs thoracentesis for the large pulmonary effusion if unresponsive to diuresis.   Diuresis limited by patient's elevated kidney function test.  #Acute kidney injury on chronic kidney disease stage III versus chronic kidney disease stage IV per nephrology  On chart review patient had a creatinine of 1.52 on GFR of 52 on 2/13/2023.  Creatinine of 3.33 with a GFR of 28 on admission  Follow BMP in a.m.  Nephrology consult  #Anemia of chronic kidney disease  Check iron studies  Follow CBC in a.m.  #Diabetes type 2 with hyperglycemia and hypoglycemia and CKD stage III  Hyperglycemia and hypoglycemia protocol  Follow Accu-Cheks  Tresiba long-acting insulin at a lower dose than at home as kidney function test elevated-patient usually takes long-acting insulin 28 units twice daily at home and was only started on 28 units once daily on 10/5/2024 on admission to hospital and blood sugar still noted to be on lower range ranging from 69-1 72 in hospital.    Decreased Tresiba to half the dose to 14 units daily on 10/6/2024, will decrease Tresiba dose further to 7 units daily.  Continue to follow Accu-Cheks.  Discussed with patient and family regarding lower insulin needs with progressive kidney disease with worsening kidney function test and need to closely monitor blood sugar and follow-up with regular outpatient primary care physician  as outpatient also.  Will continue to monitor blood sugars and labs in hospital  NovoLog carb counting and correction factor  #Hypertension with hypertensive urgency on admission  Continue home Norvasc, Coreg   No losartan at this time per cardiology due to elevated kidney function test  Blood pressure not controlled at home either according to patient and patient's family at bedside  Patient received IV labetalol from the emergency room along with IV Lasix   IV hydralazine as needed  Will adjust blood pressure medications in hospital  #Hyperlipidemia  Continue home statin and Zetia  #CAD with previous CABG and stents  Monitor on telemetry  Aspirin, statin, Coreg,  Plavix  Cardiology consult, cardiology contemplating cath in future if kidney function test improves given patient's reduced ejection fraction and prior CAD/PCI.  #Sore throat, congestion and difficulty bringing up phlegm, itchy and watery eyes-rule out viral syndrome  Will check expected respiratory flu panel  Robitussin as needed  Mucinex twice daily     10/7 NEPHROLOGY NOTE    Assessment and Plan:     1) CKD 4- Cr 1.5 2/23 -> 2.6 mg/dl 6/24, now > 3 mg/dl due to progressive diabetic nephropathy +/- cardiorenal syndrome. PLAN- hold ARB; re-eval urine      2) DM 2 > 25 yrs with triopathy and A1C historically > 8     3) CAD s/p CABG 2006     4) Longstanding HTN- recently on amlodipine 5 mg daily + coreg 25 mg bid + losartan 100 mg daily + loop diuretic PTA     5) Ischemic CM EF 40% now with recurrent CHF- previously on SGLT2-I (dc'ed due to cost). Feeling much better with diuretics; anticipate ischemic w/u- as per cards     D/W family at bedside.

## 2024-10-07 NOTE — PLAN OF CARE
Began taking care of patient this am.  VSS.  Patient c/o sore throat, Dr. Raines made aware.  Respiratory panal negative.  Patient states overall SOB improving.  Multiple visitors today.     24 hr urine study started and specimen placed on ice. SBP <160 maintained.          Problem: Patient/Family Goals  Goal: Patient/Family Long Term Goal  Description: Patient's Long Term Goal: go home    Interventions:  - echo, IV lasix  - See additional Care Plan goals for specific interventions  10/6/2024 2312 by Annamarie Solis RN  Outcome: Progressing     Problem: CARDIOVASCULAR - ADULT  Goal: Maintains optimal cardiac output and hemodynamic stability  Description: INTERVENTIONS:  - Monitor vital signs, rhythm, and trends  - Monitor for bleeding, hypotension and signs of decreased cardiac output  - Evaluate effectiveness of vasoactive medications to optimize hemodynamic stability  - Monitor arterial and/or venous puncture sites for bleeding and/or hematoma  - Assess quality of pulses, skin color and temperature  - Assess for signs of decreased coronary artery perfusion - ex. Angina  - Evaluate fluid balance, assess for edema, trend weights  Outcome: Progressing   none

## 2024-10-07 NOTE — PROGRESS NOTES
[facility]  Nephrology Progress Note    Roldan Mast Attending:  Michaelle Raines MD       Assessment and Plan:    1) CKD 4- Cr 1.5  -> 2.6 mg/dl , now > 3 mg/dl due to progressive diabetic nephropathy +/- cardiorenal syndrome. PLAN- hold ARB; re-eval urine     2) DM 2 > 25 yrs with triopathy and A1C historically > 8    3) CAD s/p CABG     4) Longstanding HTN- recently on amlodipine 5 mg daily + coreg 25 mg bid + losartan 100 mg daily + loop diuretic PTA    5) Ischemic CM EF 40% now with recurrent CHF- previously on SGLT2-I (dc'ed due to cost). Feeling much better with diuretics; anticipate ischemic w/u- as per cards    D/W family at bedside.       Subjective:  Awake alert    Physical Exam:   BP (!) 166/87 (BP Location: Right arm)   Pulse 69   Temp 97.6 °F (36.4 °C) (Oral)   Resp 20   Ht 5' 5\" (1.651 m)   Wt 151 lb 7.3 oz (68.7 kg)   SpO2 98%   BMI 25.20 kg/m²   Temp (24hrs), Av.9 °F (36.6 °C), Min:97.6 °F (36.4 °C), Max:98.2 °F (36.8 °C)       Intake/Output Summary (Last 24 hours) at 10/7/2024 1015  Last data filed at 10/7/2024 0600  Gross per 24 hour   Intake 630 ml   Output 2100 ml   Net -1470 ml     Wt Readings from Last 3 Encounters:   10/07/24 151 lb 7.3 oz (68.7 kg)   10/05/24 160 lb (72.6 kg)   24 153 lb 8 oz (69.6 kg)     General: awake alert  HEENT: No scleral icterus, MMM  Neck: Supple, no EBONY or thyromegaly  Cardiac: Regular rate and rhythm, S1, S2 normal, no murmur or tub  Lungs: Decreased BS at bases bilaterally   Abdomen: Soft, non-tender. + bowel sounds, no palpable organomegaly  Extremities: Without clubbing, cyanosis; no edema  Neurologic: Cranial nerves grossly intact, moving all extremities  Skin: Warm and dry, no rashes       Labs:   Lab Results   Component Value Date    WBC 8.8 10/07/2024    HGB 12.8 10/07/2024    HCT 36.1 10/07/2024    .0 10/07/2024    CREATSERUM 3.49 10/07/2024    BUN 61 10/07/2024     10/07/2024    K 4.9 10/07/2024      10/07/2024    CO2 26.0 10/07/2024     10/07/2024    CA 9.6 10/07/2024    PGLU 93 10/07/2024       Imaging:  All imaging studies reviewed.    Meds:   Current Facility-Administered Medications   Medication Dose Route Frequency    insulin degludec (Tresiba) 100 units/mL flextouch 7 Units  7 Units Subcutaneous Nightly    carvedilol (Coreg) tab 25 mg  25 mg Oral BID with meals    furosemide (Lasix) 10 mg/mL injection 40 mg  40 mg Intravenous Daily    glucose (Dex4) 15 GM/59ML oral liquid 15 g  15 g Oral Q15 Min PRN    Or    glucose (Glutose) 40% oral gel 15 g  15 g Oral Q15 Min PRN    Or    glucose-vitamin C (Dex-4) chewable tab 4 tablet  4 tablet Oral Q15 Min PRN    Or    dextrose 50% injection 50 mL  50 mL Intravenous Q15 Min PRN    Or    glucose (Dex4) 15 GM/59ML oral liquid 30 g  30 g Oral Q15 Min PRN    Or    glucose (Glutose) 40% oral gel 30 g  30 g Oral Q15 Min PRN    Or    glucose-vitamin C (Dex-4) chewable tab 8 tablet  8 tablet Oral Q15 Min PRN    heparin (Porcine) 5000 UNIT/ML injection 5,000 Units  5,000 Units Subcutaneous 2 times per day    acetaminophen (Tylenol Extra Strength) tab 500 mg  500 mg Oral Q4H PRN    melatonin tab 3 mg  3 mg Oral Nightly PRN    ondansetron (Zofran) 4 MG/2ML injection 4 mg  4 mg Intravenous Q6H PRN    insulin aspart (NovoLOG) 100 Units/mL FlexPen 1-10 Units  1-10 Units Subcutaneous TID AC and HS    insulin aspart (NovoLOG) 100 Units/mL FlexPen 1-68 Units  1-68 Units Subcutaneous TID CC    influenza virus trivalent PF (Fluzone Trivalent) 0.5 mL IM injection (ages 6 months to 64 years) 0.5 mL  0.5 mL Intramuscular Prior to discharge    hydrALAzine (Apresoline) 20 mg/mL injection 5 mg  5 mg Intravenous Q4H PRN    aspirin tab 325 mg  325 mg Oral Nightly    atorvastatin (Lipitor) tab 40 mg  40 mg Oral Nightly    clopidogrel (Plavix) tab 75 mg  75 mg Oral Nightly    ezetimibe (Zetia) tab 10 mg  10 mg Oral Nightly    amLODIPine (Norvasc) tab 5 mg  5 mg Oral Daily          Questions/concerns were discussed with patient and/or family by bedside.          Luiza Sarah MD  10/7/2024  10:15 AM

## 2024-10-07 NOTE — PROGRESS NOTES
[facility]   part of [facility]     Hospitalist Progress Note     Roldan Mast Patient Status:  Inpatient    1960 MRN [id number]   Location [facility] 2NE-A Attending Michaelle Raines MD   Hosp Day # 2 PCP Say Ravi MD     Chief Complaint: Shortness of breath    Subjective:     Patient states he had some sore throat and congestion and was having difficulty bringing up phlegm around 2 AM this morning.  Also had some itchy scratchy eyes and tearing.  Shortness of breath improving.  Denies any chest pain.  No nausea.   Patient seen  with patient's multiple family members including patient's wife and patient's daughter at bedside.  Patient states he wears a continuous glucose monitor and noticed that his blood sugar was 66 around 4 AM and took some juice by himself at that time and did not report to the RN according to patient and patient's wife at bedside.  Discussed with patient and family regarding communicating with RN regarding any such episodes.  Accu-Cheks noted by RN this morning around 5:07 AM was 93, but patient states this was after the juice he took.    Objective:    Review of Systems:   A comprehensive review of systems was completed; pertinent positive and negatives stated in subjective.    Vital signs:  Temp:  [97.6 °F (36.4 °C)-98.2 °F (36.8 °C)] 97.6 °F (36.4 °C)  Pulse:  [68-77] 69  Resp:  [15-20] 20  BP: (131-166)/(70-88) 166/87  SpO2:  [94 %-98 %] 98 %    Physical Exam:    BP (!) 166/87 (BP Location: Right arm)   Pulse 69   Temp 97.6 °F (36.4 °C) (Oral)   Resp 20   Ht 5' 5\" (1.651 m)   Wt 151 lb 7.3 oz (68.7 kg)   SpO2 98%   BMI 25.20 kg/m²     General: No acute distress.   HEENT: Moist mucous membranes.  Mild scleral redness no crusting seen at this time but patient and family states he felt crusting and tearing at night.  No pharyngeal erythema seen at this time  Respiratory: Decreased breath sounds at bases otherwise clear to auscultation  bilateral  Cardiovascular: S1, S2.  Regular rate and rhythm.   Abdomen: Soft, nontender, nondistended.  Positive bowel sounds.  Neurologic: No focal neurological deficits.  Musculoskeletal: Full range of motion of all extremities.  Trace bilateral pitting pedal edema improved since admission.  No calf tenderness  Integument: Keloid present over healed sternal scar from previous CABG  Psychiatric: Appropriate mood and affect.    Diagnostic Data:    Labs:  Recent Labs   Lab 10/05/24  1120 10/06/24  0824   WBC 9.9 6.8   HGB 11.7* 11.3*   MCV 82.4 84.0   .0 214.0       Recent Labs   Lab 10/05/24  1120 10/06/24  0824   * 112*   BUN 52* 47*   CREATSERUM 3.33* 3.30*   CA 9.2 9.2   ALB 4.1  --    * 134*   K 5.1 5.0    105   CO2 24.0 22.0   ALKPHO 154*  --    AST 15  --    ALT 22  --    BILT 0.4  --    TP 7.2  --        Estimated Creatinine Clearance: 20.2 mL/min (A) (based on SCr of 3.3 mg/dL (H)).    Recent Labs   Lab 10/05/24  1120 10/05/24  1323 10/06/24  0824   TROPHS 14 14 11       No results for input(s): \"PTP\", \"INR\" in the last 168 hours.               Microbiology    No results found for this visit on 10/05/24.      Imaging: Reviewed in Epic.    Medications:    carvedilol  25 mg Oral BID with meals    insulin degludec  14 Units Subcutaneous Nightly    furosemide  40 mg Intravenous Daily    heparin  5,000 Units Subcutaneous 2 times per day    insulin aspart  1-10 Units Subcutaneous TID AC and HS    insulin aspart  1-68 Units Subcutaneous TID CC    aspirin  325 mg Oral Nightly    atorvastatin  40 mg Oral Nightly    clopidogrel  75 mg Oral Nightly    ezetimibe  10 mg Oral Nightly    amLODIPine  5 mg Oral Daily       Assessment & Plan:        #Acute on chronic CHF exacerbation, with exertional shortness of breath and chest discomfort and bilateral pleural effusion  #Bilateral pleural effusion with atelectasis  CHF protocol  IV Lasix given from the emergency room, with plans to repeat BMP in  a.m. before adding more diuretics as kidney function test  elevated to 3.33 on admission.  Creatinine 3.3 today.  Discussed with nephrology, diuresis per nephrology and cardiology.  Patient looks more euvolemic at this time per nephrology today  Daily weight  I/O chart  proBNP elevated on admission  Troponin negative x 2  VQ scan with low probability of PE  CT chest without contrast showed moderate bilateral pleural effusion with adjacent areas of passive atelectasis  Incentive spirometry   2D echo done on 10/6/2024 showed EF of 35 to 40%.  Mild to moderate diffuse hypokinesis.  Hypokinesis of inferior wall.  Grade 1 diastolic dysfunction.  Cardiology consult  Symptoms improving today  Bilateral large pleural effusion-pulmonary consult in case needs thoracentesis for the large pulmonary effusion if unresponsive to diuresis.  Diuresis limited by patient's elevated kidney function test.  #Acute kidney injury on chronic kidney disease stage III versus chronic kidney disease stage IV per nephrology  On chart review patient had a creatinine of 1.52 on GFR of 52 on 2/13/2023.  Creatinine of 3.33 with a GFR of 28 on admission  Follow BMP in a.m.  Nephrology consult  #Anemia of chronic kidney disease  Check iron studies  Follow CBC in a.m.  #Diabetes type 2 with hyperglycemia and hypoglycemia and CKD stage III  Hyperglycemia and hypoglycemia protocol  Follow Accu-Cheks  Tresiba long-acting insulin at a lower dose than at home as kidney function test elevated-patient usually takes long-acting insulin 28 units twice daily at home and was only started on 28 units once daily on 10/5/2024 on admission to hospital and blood sugar still noted to be on lower range ranging from 69-1 72 in hospital.    Decreased Tresiba to half the dose to 14 units daily on 10/6/2024, will decrease Tresiba dose further to 7 units daily.  Continue to follow Accu-Cheks.  Discussed with patient and family regarding lower insulin needs with progressive  kidney disease with worsening kidney function test and need to closely monitor blood sugar and follow-up with regular outpatient primary care physician  as outpatient also.  Will continue to monitor blood sugars and labs in hospital  NovoLog carb counting and correction factor  #Hypertension with hypertensive urgency on admission  Continue home Norvasc, Coreg   No losartan at this time per cardiology due to elevated kidney function test  Blood pressure not controlled at home either according to patient and patient's family at bedside  Patient received IV labetalol from the emergency room along with IV Lasix   IV hydralazine as needed  Will adjust blood pressure medications in hospital  #Hyperlipidemia  Continue home statin and Zetia  #CAD with previous CABG and stents  Monitor on telemetry  Aspirin, statin, Coreg, Plavix  Cardiology consult, cardiology contemplating cath in future if kidney function test improves given patient's reduced ejection fraction and prior CAD/PCI.  #Sore throat, congestion and difficulty bringing up phlegm, itchy and watery eyes-rule out viral syndrome  Will check expected respiratory flu panel  Robitussin as needed  Mucinex twice daily     Discussed with patient, patient's family at bedside.    Discussed with RN    Michaelle Raines MD    Supplementary Documentation:     Quality:  DVT Mechanical Prophylaxis:   SCDs,    DVT Pharmacologic Prophylaxis   Medication    heparin (Porcine) 5000 UNIT/ML injection 5,000 Units    DVT Pharmacologic prophylaxis: Aspirin 325 mg           Code Status: Not on file  John: No urinary catheter in place  John Duration (in days):   Central line:    CELNEA:     Discharge is dependent on: Clinical progress  At this point Mr. Mast is expected to be discharge to: Home    The 21st Century Cures Act makes medical notes like these available to patients in the interest of transparency. Please be advised this is a medical document. Medical documents are intended to  carry relevant information, facts as evident, and the clinical opinion of the practitioner. The medical note is intended as peer to peer communication and may appear blunt or direct. It is written in medical language and may contain abbreviations or verbiage that are unfamiliar.

## 2024-10-07 NOTE — PLAN OF CARE
Pt chief complaint upon admission: CP & orthopnea. Received pt in bed at handoff 1900. A&Ox 4. RA. Rhythm NSR on tele. GI/ WNL. No complaint of chest pain. All medications given as ordered. Pt resting in bed w/ all safety measures in place and call light within reach.     Care completed over shift: 24 hr urine study started and specimen placed on ice. SBP <160 maintained.    Plan is to continue to diurese and consider potential cath if creatinine improves.     Problem: Patient/Family Goals  Goal: Patient/Family Long Term Goal  Description: Patient's Long Term Goal: go home    Interventions:  - echo, IV lasix  - See additional Care Plan goals for specific interventions  10/6/2024 2312 by Annamarie Solis RN  Outcome: Progressing     Problem: CARDIOVASCULAR - ADULT  Goal: Maintains optimal cardiac output and hemodynamic stability  Description: INTERVENTIONS:  - Monitor vital signs, rhythm, and trends  - Monitor for bleeding, hypotension and signs of decreased cardiac output  - Evaluate effectiveness of vasoactive medications to optimize hemodynamic stability  - Monitor arterial and/or venous puncture sites for bleeding and/or hematoma  - Assess quality of pulses, skin color and temperature  - Assess for signs of decreased coronary artery perfusion - ex. Angina  - Evaluate fluid balance, assess for edema, trend weights  Outcome: Progressing

## 2024-10-08 ENCOUNTER — APPOINTMENT (OUTPATIENT)
Dept: GENERAL RADIOLOGY | Facility: HOSPITAL | Age: 64
End: 2024-10-08
Attending: INTERNAL MEDICINE
Payer: MEDICAID

## 2024-10-08 LAB
ALBUMIN SERPL-MCNC: 4 G/DL (ref 3.2–4.8)
ALBUMIN/GLOB SERPL: 1.3 {RATIO} (ref 1–2)
ALP LIVER SERPL-CCNC: 149 U/L
ALT SERPL-CCNC: 19 U/L
ANION GAP SERPL CALC-SCNC: 7 MMOL/L (ref 0–18)
AST SERPL-CCNC: 14 U/L (ref ?–34)
BILIRUB SERPL-MCNC: 0.5 MG/DL (ref 0.2–1.1)
BUN BLD-MCNC: 69 MG/DL (ref 9–23)
CALCIUM BLD-MCNC: 9 MG/DL (ref 8.7–10.4)
CHLORIDE SERPL-SCNC: 101 MMOL/L (ref 98–112)
CO2 SERPL-SCNC: 25 MMOL/L (ref 21–32)
CREAT BLD-MCNC: 3.65 MG/DL
EGFRCR SERPLBLD CKD-EPI 2021: 18 ML/MIN/1.73M2 (ref 60–?)
GLOBULIN PLAS-MCNC: 3 G/DL (ref 2–3.5)
GLUCOSE BLD-MCNC: 100 MG/DL (ref 70–99)
GLUCOSE BLD-MCNC: 106 MG/DL (ref 70–99)
GLUCOSE BLD-MCNC: 109 MG/DL (ref 70–99)
GLUCOSE BLD-MCNC: 111 MG/DL (ref 70–99)
GLUCOSE BLD-MCNC: 75 MG/DL (ref 70–99)
GLUCOSE BLD-MCNC: 75 MG/DL (ref 70–99)
GLUCOSE BLD-MCNC: 88 MG/DL (ref 70–99)
GLUCOSE BLD-MCNC: 97 MG/DL (ref 70–99)
OSMOLALITY SERPL CALC.SUM OF ELEC: 296 MOSM/KG (ref 275–295)
POTASSIUM SERPL-SCNC: 4.5 MMOL/L (ref 3.5–5.1)
PROT SERPL-MCNC: 7 G/DL (ref 5.7–8.2)
SODIUM SERPL-SCNC: 133 MMOL/L (ref 136–145)

## 2024-10-08 PROCEDURE — 99232 SBSQ HOSP IP/OBS MODERATE 35: CPT | Performed by: INTERNAL MEDICINE

## 2024-10-08 PROCEDURE — 99231 SBSQ HOSP IP/OBS SF/LOW 25: CPT | Performed by: INTERNAL MEDICINE

## 2024-10-08 PROCEDURE — 71045 X-RAY EXAM CHEST 1 VIEW: CPT | Performed by: INTERNAL MEDICINE

## 2024-10-08 PROCEDURE — 99233 SBSQ HOSP IP/OBS HIGH 50: CPT | Performed by: INTERNAL MEDICINE

## 2024-10-08 RX ORDER — HYDRALAZINE HYDROCHLORIDE 25 MG/1
25 TABLET, FILM COATED ORAL EVERY 8 HOURS SCHEDULED
Status: DISCONTINUED | OUTPATIENT
Start: 2024-10-08 | End: 2024-10-09

## 2024-10-08 RX ORDER — CARVEDILOL 12.5 MG/1
12.5 TABLET ORAL 2 TIMES DAILY WITH MEALS
Status: DISCONTINUED | OUTPATIENT
Start: 2024-10-08 | End: 2024-10-09

## 2024-10-08 RX ORDER — ISOSORBIDE MONONITRATE 30 MG/1
30 TABLET, EXTENDED RELEASE ORAL DAILY
Status: DISCONTINUED | OUTPATIENT
Start: 2024-10-08 | End: 2024-10-08

## 2024-10-08 RX ORDER — ISOSORBIDE MONONITRATE 30 MG/1
30 TABLET, EXTENDED RELEASE ORAL DAILY
Status: DISCONTINUED | OUTPATIENT
Start: 2024-10-09 | End: 2024-10-09

## 2024-10-08 NOTE — PROGRESS NOTES
[facility]  Division of Cardiology  Progress Note    Roldan Mast Patient Status:  Inpatient    1960 MRN [id number]   Location [facility] 2NE-A Attending Michaelle Raines MD   Hosp Day # 3 PCP Say Ravi MD   Assessment:  SOB/Acute on chronic systolic congestive heart failure  Bilateral pleural effusions  Troponin negative  VQ neg for PE  Echo with EF now 35-40% range (from 45% range)  CAD, PCI x 2 and CABG   Dyslipidemia  DM, insulin dependent  HTN  CKD3, follows with [facility] CHRISTINA, baseline Cr 1.5  JOHN, Cr 3.6 today from baseline 1.5  Anemia, Hgb 12.      Plan:  CHF: EF down a bit.  Although angio would be reasonable, given Cr no angio/cath for now.  Likewise holding Losartan/Entresto. Holding diuresis given Cr. Continue Coreg for now.  But breathing much improved and on RA.  CAD: DAPT, Consider cath in the future if his Cr improves given his reduced EF and prior CAD/PCI.  CKD: Labs in AM, holding toxins, Cr 3.5 today.  [facility] on consult.  Lipids: Lipitor, zetia.  Goal LDL<55.  DM: Per primary team.  HTN/CHF meds: After discussing with the patient, his wife and Dr. Sarah, we will: 1. DC amlodipine, 2. decrease to Coreg 12.5 BID, 3. initiate Nitrates/hydralizine at modest dose (Imdur 30 daily, Hydralizine 25mg TID) to start slowly.  Patient and wife worried about dizziness, which I told them is a very valid concern.  But each med does have a distinct role and data behind it.  So we will start with this and adjust as necessary, current  systolic.  They were satisfied with this.         Subjective:  No overnight events.  The patient denies any significant chest pain or dyspnea.  Notes his breathing is \"much better\".   Cr up a bit over yesterday.    Objective:  /82 (BP Location: Right arm)   Pulse 67   Temp 97.5 °F (36.4 °C) (Oral)   Resp 20   Ht 65\"   Wt 148 lb 5.9 oz (67.3 kg)   SpO2 96%   BMI 24.69 kg/m²   Temp (24hrs), Av.7 °F (36.5  °C), Min:97.4 °F (36.3 °C), Max:98 °F (36.7 °C)      Intake/Output Summary (Last 24 hours) at 10/8/2024 1244  Last data filed at 10/8/2024 1000  Gross per 24 hour   Intake 900 ml   Output 350 ml   Net 550 ml     Wt Readings from Last 3 Encounters:   10/08/24 148 lb 5.9 oz (67.3 kg)   10/05/24 160 lb (72.6 kg)   06/27/24 153 lb 8 oz (69.6 kg)       Physical exam:  General: Well developed, well nourished male.  Pt is in no acute distress.  Neck:  No JVD.  Supple with normal ROM.  Cardiac: Regular rate and rhythm.  No murmurs, rubs, or gallops.   Lungs: Clear to ascultation bilaterally.    Abdomen: Soft.  Non-distended.  Non-tender.  Bowel sounds are present.  No guarding or rebound.   Extremities: Warm, no significant edema.  Palpable pulses.  Neurologic: Alert and oriented, normal affect.  No gross deficit appreciated.  Integument:  No visible rashes are appreciated.  Psych: The affect is appropriate.    Meds:    isosorbide mononitrate ER  30 mg Oral Daily    hydrALAZINE  25 mg Oral Q8H ARTHUR    carvedilol  25 mg Oral BID with meals    [Held by provider] furosemide  40 mg Intravenous Daily    heparin  5,000 Units Subcutaneous 2 times per day    insulin aspart  1-10 Units Subcutaneous TID AC and HS    insulin aspart  1-68 Units Subcutaneous TID CC    aspirin  325 mg Oral Nightly    atorvastatin  40 mg Oral Nightly    clopidogrel  75 mg Oral Nightly    ezetimibe  10 mg Oral Nightly    amLODIPine  5 mg Oral Daily       Laboratory Data:  Lab Results   Component Value Date     10/08/2024    K 4.5 10/08/2024     10/08/2024    CO2 25.0 10/08/2024    BUN 69 10/08/2024    CREATSERUM 3.65 10/08/2024    GLU 88 10/08/2024    CA 9.0 10/08/2024          Lab Results   Component Value Date    INR 1.0 09/10/2012       Telemetry: Sinus.  Unremarkable.        Thank you for allowing our group to care for your patient. Please contact me with any questions.   Denver Benitez MD  [phone]

## 2024-10-08 NOTE — PROGRESS NOTES
[facility]  Nephrology Progress Note    Rlodan Mast Attending:  Michaelle Raines MD       Assessment and Plan:    1) CKD 4- Cr 1.5  -> 2.6 mg/dl , now > 3 mg/dl due to progressive diabetic nephropathy +/- cardiorenal syndrome. UA remains bland with modest isolated proteinuria. PLAN- hold ARB; re-eval diuretics     2) DM 2 > 25 yrs with triopathy and A1C historically > 8     3) CAD s/p CABG      4) Longstanding HTN- on amlodipine 5 mg daily + coreg 25 mg bid + losartan 100 mg daily + loop diuretic PTA     5) Ischemic CM EF 40% now with recurrent CHF- previously on SGLT2-I (dc'ed due to cost). Diuresed- now euvolemic.      D/W - will start imdur 30 mg daily + TRUJILLO 25 mg tid (in lieu of amlodipine) + daily loop diuretic. Anticipate dc home Wed.       Subjective:  Awake alert feels \"great\" no c/o     Physical Exam:   /89 (BP Location: Right arm)   Pulse 72   Temp 97.6 °F (36.4 °C) (Oral)   Resp 20   Ht 5' 5\" (1.651 m)   Wt 148 lb 5.9 oz (67.3 kg)   SpO2 96%   BMI 24.69 kg/m²   Temp (24hrs), Av.7 °F (36.5 °C), Min:97.4 °F (36.3 °C), Max:98 °F (36.7 °C)       Intake/Output Summary (Last 24 hours) at 10/8/2024 0833  Last data filed at 10/8/2024 0753  Gross per 24 hour   Intake 540 ml   Output 350 ml   Net 190 ml     Wt Readings from Last 3 Encounters:   10/08/24 148 lb 5.9 oz (67.3 kg)   10/05/24 160 lb (72.6 kg)   24 153 lb 8 oz (69.6 kg)     General: awake alert  HEENT: No scleral icterus, MMM  Neck: Supple, no EBONY or thyromegaly  Cardiac: Regular rate and rhythm, S1, S2 normal, no murmur or tub  Lungs: Decreased BS at bases bilaterally   Abdomen: Soft, non-tender. + bowel sounds, no palpable organomegaly  Extremities: Without clubbing, cyanosis; no edema  Neurologic: Cranial nerves grossly intact, moving all extremities  Skin: Warm and dry, no rashes       Labs:   Lab Results   Component Value Date    WBC 8.8 10/07/2024    HGB 12.8 10/07/2024    HCT 36.1 10/07/2024    PLT  248.0 10/07/2024    CREATSERUM 3.65 10/08/2024    BUN 69 10/08/2024     10/08/2024    K 4.5 10/08/2024     10/08/2024    CO2 25.0 10/08/2024    GLU 88 10/08/2024    CA 9.0 10/08/2024    ALB 4.0 10/08/2024    ALKPHO 149 10/08/2024    BILT 0.5 10/08/2024    TP 7.0 10/08/2024    AST 14 10/08/2024    ALT 19 10/08/2024    PGLU 106 10/08/2024       Imaging:  All imaging studies reviewed.    Meds:   Current Facility-Administered Medications   Medication Dose Route Frequency    insulin degludec (Tresiba) 100 units/mL flextouch 7 Units  7 Units Subcutaneous Nightly    carvedilol (Coreg) tab 25 mg  25 mg Oral BID with meals    [Held by provider] furosemide (Lasix) 10 mg/mL injection 40 mg  40 mg Intravenous Daily    glucose (Dex4) 15 GM/59ML oral liquid 15 g  15 g Oral Q15 Min PRN    Or    glucose (Glutose) 40% oral gel 15 g  15 g Oral Q15 Min PRN    Or    glucose-vitamin C (Dex-4) chewable tab 4 tablet  4 tablet Oral Q15 Min PRN    Or    dextrose 50% injection 50 mL  50 mL Intravenous Q15 Min PRN    Or    glucose (Dex4) 15 GM/59ML oral liquid 30 g  30 g Oral Q15 Min PRN    Or    glucose (Glutose) 40% oral gel 30 g  30 g Oral Q15 Min PRN    Or    glucose-vitamin C (Dex-4) chewable tab 8 tablet  8 tablet Oral Q15 Min PRN    heparin (Porcine) 5000 UNIT/ML injection 5,000 Units  5,000 Units Subcutaneous 2 times per day    acetaminophen (Tylenol Extra Strength) tab 500 mg  500 mg Oral Q4H PRN    melatonin tab 3 mg  3 mg Oral Nightly PRN    ondansetron (Zofran) 4 MG/2ML injection 4 mg  4 mg Intravenous Q6H PRN    insulin aspart (NovoLOG) 100 Units/mL FlexPen 1-10 Units  1-10 Units Subcutaneous TID AC and HS    insulin aspart (NovoLOG) 100 Units/mL FlexPen 1-68 Units  1-68 Units Subcutaneous TID CC    influenza virus trivalent PF (Fluzone Trivalent) 0.5 mL IM injection (ages 6 months to 64 years) 0.5 mL  0.5 mL Intramuscular Prior to discharge    hydrALAzine (Apresoline) 20 mg/mL injection 5 mg  5 mg Intravenous Q4H PRN     aspirin tab 325 mg  325 mg Oral Nightly    atorvastatin (Lipitor) tab 40 mg  40 mg Oral Nightly    clopidogrel (Plavix) tab 75 mg  75 mg Oral Nightly    ezetimibe (Zetia) tab 10 mg  10 mg Oral Nightly    amLODIPine (Norvasc) tab 5 mg  5 mg Oral Daily         Questions/concerns were discussed with patient and/or family by bedside.          Luiza Sarah MD  10/8/2024  8:33 AM

## 2024-10-08 NOTE — PROGRESS NOTES
[facility]   part of [facility]     Hospitalist Progress Note     Roldan Mast Patient Status:  Inpatient    1960 MRN [id number]   Location [facility] 2NE-A Attending Michaelle Raines MD   Hosp Day # 3 PCP Say Ravi MD     Chief Complaint: Shortness of breath    Subjective:     No more sore throat and congestion or  itchy scratchy eyes and tearing.  Shortness of breath improving.  Denies any chest pain.  No nausea.   Patient seen  with patient's multiple family members including patient's wife and patient's daughter at bedside.  Patient states he wears a continuous glucose monitor and noticed that his blood sugar was low again, noted to be 75 around 4:44 AM today     Objective:    Review of Systems:   A comprehensive review of systems was completed; pertinent positive and negatives stated in subjective.    Vital signs:  Temp:  [97.4 °F (36.3 °C)-98 °F (36.7 °C)] 97.6 °F (36.4 °C)  Pulse:  [71-76] 72  Resp:  [16-20] 20  BP: (122-148)/(75-93) 148/89  SpO2:  [93 %-99 %] 96 %    Physical Exam:    /89 (BP Location: Right arm)   Pulse 72   Temp 97.6 °F (36.4 °C) (Oral)   Resp 20   Ht 5' 5\" (1.651 m)   Wt 148 lb 5.9 oz (67.3 kg)   SpO2 96%   BMI 24.69 kg/m²     General: No acute distress.   HEENT: Moist mucous membranes.  Mild scleral redness no crusting seen at this time but patient and family states he felt crusting and tearing at night.  No pharyngeal erythema seen at this time  Respiratory: Decreased breath sounds at bases otherwise clear to auscultation bilateral  Cardiovascular: S1, S2.  Regular rate and rhythm.   Abdomen: Soft, nontender, nondistended.  Positive bowel sounds.  Neurologic: No focal neurological deficits.  Musculoskeletal: Full range of motion of all extremities.  Trace bilateral pitting pedal edema improved since admission.  No calf tenderness  Integument: Keloid present over healed sternal scar from previous CABG  Psychiatric: Appropriate mood and  affect.    Diagnostic Data:    Labs:  Recent Labs   Lab 10/05/24  1120 10/06/24  0824 10/07/24  0938   WBC 9.9 6.8 8.8   HGB 11.7* 11.3* 12.8*   MCV 82.4 84.0 81.3   .0 214.0 248.0       Recent Labs   Lab 10/05/24  1120 10/06/24  0824 10/07/24  0938 10/08/24  0602   * 112* 108* 88   BUN 52* 47* 61* 69*   CREATSERUM 3.33* 3.30* 3.49* 3.65*   CA 9.2 9.2 9.6 9.0   ALB 4.1  --   --  4.0   * 134* 134* 133*   K 5.1 5.0 4.9 4.5    105 101 101   CO2 24.0 22.0 26.0 25.0   ALKPHO 154*  --   --  149*   AST 15  --   --  14   ALT 22  --   --  19   BILT 0.4  --   --  0.5   TP 7.2  --   --  7.0       Estimated Creatinine Clearance: 18.3 mL/min (A) (based on SCr of 3.65 mg/dL (H)).    Recent Labs   Lab 10/05/24  1120 10/05/24  1323 10/06/24  0824   TROPHS 14 14 11       No results for input(s): \"PTP\", \"INR\" in the last 168 hours.               Microbiology    No results found for this visit on 10/05/24.      Imaging: Reviewed in Epic.    Medications:    isosorbide mononitrate ER  30 mg Oral Daily    hydrALAZINE  25 mg Oral Q8H ARTHUR    carvedilol  25 mg Oral BID with meals    [Held by provider] furosemide  40 mg Intravenous Daily    heparin  5,000 Units Subcutaneous 2 times per day    insulin aspart  1-10 Units Subcutaneous TID AC and HS    insulin aspart  1-68 Units Subcutaneous TID CC    aspirin  325 mg Oral Nightly    atorvastatin  40 mg Oral Nightly    clopidogrel  75 mg Oral Nightly    ezetimibe  10 mg Oral Nightly    amLODIPine  5 mg Oral Daily       Assessment & Plan:        #Acute on chronic CHF exacerbation, with exertional shortness of breath and chest discomfort and bilateral pleural effusion  #Bilateral pleural effusion with atelectasis  CHF protocol  IV Lasix given from the emergency room, with plans to repeat BMP in a.m. before adding more diuretics as kidney function test  elevated to 3.33 on admission.  Creatinine 3.3 today.  Discussed with nephrology, diuresis per nephrology and cardiology.   Patient looks more euvolemic at this time per nephrology today  Daily weight  I/O chart  proBNP elevated on admission  Troponin negative x 2  VQ scan with low probability of PE  CT chest without contrast showed moderate bilateral pleural effusion with adjacent areas of passive atelectasis  Incentive spirometry   2D echo done on 10/6/2024 showed EF of 35 to 40%.  Mild to moderate diffuse hypokinesis.  Hypokinesis of inferior wall.  Grade 1 diastolic dysfunction.  Cardiology consult  Symptoms improving today  Bilateral large pleural effusion-pulmonary consult in case needs thoracentesis for the large pulmonary effusion if unresponsive to diuresis.  Diuresis limited by patient's elevated kidney function test.  # chronic kidney disease stage IV per nephrology  On chart review patient had a creatinine of 1.52 on GFR of 52 on 2/13/2023.  Creatinine of 3.33 with a GFR of 28 on admission  Follow BMP in a.m.  Nephrology consult  #Anemia of chronic kidney disease stage IV  Check iron studies  Follow CBC in a.m.  #Diabetes type 2 with hyperglycemia and hypoglycemia and CKD stage 4  Hyperglycemia and hypoglycemia protocol  Follow Accu-Cheks  Initially on admission started on Tresiba long-acting insulin at a lower dose than at home as kidney function test elevated-patient usually takes long-acting insulin 28 units twice daily at home and was only started on 28 units once daily on 10/5/2024 on admission to hospital and blood sugar still noted to be on lower range  in hospital.  Decreased Tresiba to half the dose to 14 units daily on 10/6/2024, will decrease Tresiba dose further to 7 units daily on 10/7/2024 and discontinued Tresiba completely on 10/8/2024.  Continue to follow Accu-Cheks.  Discussed with patient and family regarding lower insulin needs with progressive kidney disease with worsening kidney function test and need to closely monitor blood sugar and follow-up with regular outpatient primary care physician  as  outpatient also.  Will continue to monitor blood sugars and labs in hospital  Continue NovoLog carb counting and correction factor at this time  #Hypertension with hypertensive urgency on admission  Blood pressure and cardiac medications being adjusted by cardiology and nephrology in hospital   No losartan at this time per cardiology due to elevated kidney function test  Blood pressure not controlled at home either according to patient and patient's family at bedside  Patient received IV labetalol from the emergency room along with IV Lasix   IV hydralazine as needed  Will adjust blood pressure medications in hospital  #Hyperlipidemia  Continue home statin and Zetia  #CAD with previous CABG and stents  Monitor on telemetry  Aspirin, statin, Coreg, Plavix  Cardiology consult, cardiology contemplating cath in future if kidney function test improves given patient's reduced ejection fraction and prior CAD/PCI.  #Sore throat, congestion and difficulty bringing up phlegm, itchy and watery eyes-rule out viral syndrome  Will check expected respiratory flu panel  Robitussin as needed  Mucinex twice daily     Discussed with patient, patient's family at bedside.    Discussed with RN    Dr. Key will follow from morning tomorrow    Michaelle Raines MD    Supplementary Documentation:     Quality:  DVT Mechanical Prophylaxis:   SCDs,    DVT Pharmacologic Prophylaxis   Medication    heparin (Porcine) 5000 UNIT/ML injection 5,000 Units    DVT Pharmacologic prophylaxis: Aspirin 325 mg           Code Status: Not on file  John: No urinary catheter in place  John Duration (in days):   Central line:    CELENA:     Discharge is dependent on: Clinical progress  At this point Mr. Mast is expected to be discharge to: Home    The 21st Century Cures Act makes medical notes like these available to patients in the interest of transparency. Please be advised this is a medical document. Medical documents are intended to carry relevant  information, facts as evident, and the clinical opinion of the practitioner. The medical note is intended as peer to peer communication and may appear blunt or direct. It is written in medical language and may contain abbreviations or verbiage that are unfamiliar.

## 2024-10-08 NOTE — PLAN OF CARE
Encourage ambulation in maciel  2gm na and 2000 fluid restriction  No swelling to BLE  Monitor glucose- CGM  Room air  Declines scds  Apx 1hour after meds, BP dropped to 110's and patient said he felt funny and was seeing spots. Pt returned to bed and glucose remained stable.   Per patient at home when he takes coreg BID he feels this way. Imdur and hydralazine started per nephrology. Made dr. Sarah aware of patient status. At this time hold new medications and reevaluate.   Lasix remains on hold  No nsaids  Daily weight  Keep sbp less than 160

## 2024-10-08 NOTE — PROGRESS NOTES
[facility]  Progress Note    Roldan Mast Patient Status:  Inpatient    1960 MRN [id number]   Location [facility] 2NE-A Attending Michaelle Raines MD   Hosp Day # 3 PCP Say Ravi MD     Subjective:  Roldan Mast is a(n) 64 year old male remains afebrile  Continues to improve overall able to lay supine denies any specific shortness of breath or chest pain    Objective:  /82 (BP Location: Right arm)   Pulse 67   Temp 97.5 °F (36.4 °C) (Oral)   Resp 20   Ht 5' 5\" (1.651 m)   Wt 148 lb 5.9 oz (67.3 kg)   SpO2 96%   BMI 24.69 kg/m² remains on room air      Temp (24hrs), Av.7 °F (36.5 °C), Min:97.4 °F (36.3 °C), Max:98 °F (36.7 °C)      Intake/Output:    Intake/Output Summary (Last 24 hours) at 10/8/2024 1831  Last data filed at 10/8/2024 1300  Gross per 24 hour   Intake 1140 ml   Output 350 ml   Net 790 ml       Physical Exam:   General: alert, cooperative, oriented.  No respiratory distress.   Head: Normocephalic, without obvious abnormality, atraumatic.   Throat: Lips, mucosa, and tongue normal.  No thrush noted.   Neck: trachea midline, no adenopathy, no thyromegaly. No JVD.   Lungs: Bilateral basilar dullness seems less today with fine rales overall better air entry   Chest wall: No tenderness or deformity.   Heart: Multiple murmurs noted   Abdomen: soft, non-distended, no masses, no guarding, no     Rebound.  No obvious ascites   Extremity: Thin nontender   Skin: No rashes or lesions.   Neurological: Alert, interactive, no focal deficits    Lab Data Review:  Recent Labs     10/06/24  0824 10/07/24  0938   WBC 6.8 8.8   HGB 11.3* 12.8*   .0 248.0     Recent Labs     10/06/24  0824 10/07/24  0938 10/08/24  0602   * 134* 133*   K 5.0 4.9 4.5    101 101   CO2 22.0 26.0 25.0   BUN 47* 61* 69*   CREATSERUM 3.30* 3.49* 3.65*     No results for input(s): \"PTP\", \"INR\", \"PTT\" in the last 168 hours.    Cultures: Negative viral studies    Radiology:  XR CHEST AP  PORTABLE  (CPT=71045)    Result Date: 10/8/2024  CONCLUSION:  1. When compared to 10/5/2024 chest radiograph, improvement of bibasilar opacities.  Persistent small bilateral pleural effusions with atelectasis/consolidation, left greater than right.   LOCATION:  [facility]      Dictated by (CST): Winnie Gamboa MD on 10/08/2024 at 12:30 PM     Finalized by (CST): Winnie Gamboa MD on 10/08/2024 at 12:32 PM      Chest x-ray with improved aeration decreased effusions and infiltrates      Medications reviewed     Assessment and Plan:   Patient Active Problem List   Diagnosis    Vitamin D deficiency    S/P coronary artery stent placement    Hyperlipidemia with target low density lipoprotein (LDL) cholesterol less than 70 mg/dL    S/P CABG x 5    CAD (coronary artery disease), autologous vein bypass graft    Diabetic retinopathy of both eyes ([facility])    HFrEF (heart failure with reduced ejection fraction) ([facility])    Type 2 diabetes mellitus with hyperglycemia, with long-term current use of insulin ([facility])    Primary hypertension    Esophageal reflux    Abdominal pain, chronic, left upper quadrant    CKD stage 3 secondary to diabetes ([facility])    Fluid overload    Acute kidney injury superimposed on CKD ([facility])    Acute on chronic congestive heart failure ([facility])    CKD (chronic kidney disease) stage 4, GFR 15-29 ml/min ([facility])    Hypoxia    Pleural effusion    Anemia, chronic disease    JOHN (acute kidney injury) ([facility])    Type 2 diabetes mellitus with hypoglycemia without coma, with long-term current use of insulin ([facility])       Assessment:  Dyspnea now improved with diuretics  Bilateral pleural effusion moderate to large right greater than lef as noted on CT chest 10/5/2024 ventilation/perfusion scan low probability t: Suspect due to cardiac dysfunction-continuing to improve  Bilateral lower lobe atelectasis in association with pleural effusion  Acute on chronic systolic CHF   Cardiomyopathy with left ventricular ejection fraction of 35 to 40% and mild  to moderate diffuse hypokinesis on echocardiogram 10/6/2024.  Troponin normal range.  Pulmonary hypertension with PA systolic pressure 35 to 40mmHg  CKD 4    Plan:  Cardiology input appreciated with ongoing medical management  Decreasing effusions-no indication for thoracentesis at this time  Will follow peripherally at this time please call if can be of further service    CC     Renetta Palacios MD  10/8/2024  6:31 PM

## 2024-10-08 NOTE — PLAN OF CARE
Problem: CARDIOVASCULAR - ADULT  Goal: Maintains optimal cardiac output and hemodynamic stability  Description: INTERVENTIONS:  - Monitor vital signs, rhythm, and trends  - Monitor for bleeding, hypotension and signs of decreased cardiac output  - Evaluate effectiveness of vasoactive medications to optimize hemodynamic stability  - Monitor arterial and/or venous puncture sites for bleeding and/or hematoma  - Assess quality of pulses, skin color and temperature  - Assess for signs of decreased coronary artery perfusion - ex. Angina  - Evaluate fluid balance, assess for edema, trend weights  Outcome: Progressing  Goal: Absence of cardiac arrhythmias or at baseline  Description: INTERVENTIONS:  - Continuous cardiac monitoring, monitor vital signs, obtain 12 lead EKG if indicated  - Evaluate effectiveness of antiarrhythmic and heart rate control medications as ordered  - Initiate emergency measures for life threatening arrhythmias  - Monitor electrolytes and administer replacement therapy as ordered  Outcome: Progressing  Received sitting in bed. Support person at bedside. Vss, resps unlabored at rest on RA. CXR today: persistent B pleural effusions. Creat 3.6, holding lasix as ordered. Fall precautions and fluid restrictions continued.   Labs and meds as ordered. Cont fall precautions and fluid restrictions. Cont holding diuretics as ordered.

## 2024-10-08 NOTE — DIETARY NOTE
[facility]   part of [facility]   CLINICAL NUTRITION    Roldan Mast     Admitting diagnosis:  Fluid overload    Ht: 165.1 cm (5' 5\")  Wt: 67.3 kg (148 lb 5.9 oz).   Body mass index is 24.69 kg/m².  IBW: 61.8 kg    Wt Readings from Last 6 Encounters:   10/08/24 67.3 kg (148 lb 5.9 oz)   10/05/24 72.6 kg (160 lb)   06/27/24 69.6 kg (153 lb 8 oz)   03/19/24 70.2 kg (154 lb 12.8 oz)   01/16/24 72.1 kg (159 lb)   07/31/23 71.8 kg (158 lb 6.4 oz)        Labs/Meds reviewed  -POC Glu:, Glu:88, Na++:133, K+:4.5, BUN:69, Cr:3.65, GFR:18, elevated BNP  -Lasix, Insulin    Diet:       Procedures    Cardiac diet Calorie Restriction/Carb Controlled: 1800 kcal/60 grams; Sodium Restriction: 2 GM NA; Fluid Restriction: 2000 ml; Is Patient on Accuchecks? Yes     Percent Meals Eaten (last 3 days)       Date/Time Percent Meals Eaten (%)    10/05/24 1900 100 %    10/06/24 0809 100 %    10/06/24 1300 75 %    10/06/24 1855 100 %    10/07/24 2020 75 %    10/08/24 1000 100 %    10/08/24 1300 100 %          Pt chart reviewed d/t nutrition consult for CHF and renal diet education.    Pt's daughter present at time of visit. Pt stated he is mindful of following CHO, sodium, and potassium restrictions at home. Pt stated he does not add salt to foods and does not use canned food products. Makes soup from \"scratch.\"  Provided handout on Low Sodium Nutrition Therapy with list of foods recommended, foods to avoid/limit, sample menus, and list of salt free seasoning alternatives. All pt's nutrition related questions answered at this time.  Provided contact information for outpatient nutrition services and [facility] Center in pt instructions for discharge.     Patient reports good appetite at this time.  Nursing notes reports Percent Meals Eaten (%): 100 % intake for last meal.  Tolerating po diet without diarrhea, emesis, or constipation. Last BM:10/8. Skin intact. Edema to B/L LE. B/L pleural effusions.    Wt trending down this admit;  suspect d/t diuresis. Pt reported usual wt range of 162 lb-164 lb.     PMH:HTN, DL, CAD, CABG, DM, has CGM, CKD4.     Patient is at low nutrition risk at this time.    Please consult if patient status changes or nutrition issues arise.    Macie Gleason MS, RD, LDN  Clinical Dietitian  Ext:[phone]

## 2024-10-08 NOTE — PLAN OF CARE
Problem: Patient/Family Goals  Goal: Patient/Family Long Term Goal  Description: Patient's Long Term Goal: go home    Interventions:  - echo, IV lasix  - See additional Care Plan goals for specific interventions  Outcome: Progressing  Goal: Patient/Family Short Term Goal  Description: Patient's Short Term Goal: feel better    Interventions:   - echo, IV lasix  - See additional Care Plan goals for specific interventions  Outcome: Progressing     Problem: CARDIOVASCULAR - ADULT  Goal: Maintains optimal cardiac output and hemodynamic stability  Description: INTERVENTIONS:  - Monitor vital signs, rhythm, and trends  - Monitor for bleeding, hypotension and signs of decreased cardiac output  - Evaluate effectiveness of vasoactive medications to optimize hemodynamic stability  - Monitor arterial and/or venous puncture sites for bleeding and/or hematoma  - Assess quality of pulses, skin color and temperature  - Assess for signs of decreased coronary artery perfusion - ex. Angina  - Evaluate fluid balance, assess for edema, trend weights  Outcome: Progressing  Goal: Absence of cardiac arrhythmias or at baseline  Description: INTERVENTIONS:  - Continuous cardiac monitoring, monitor vital signs, obtain 12 lead EKG if indicated  - Evaluate effectiveness of antiarrhythmic and heart rate control medications as ordered  - Initiate emergency measures for life threatening arrhythmias  - Monitor electrolytes and administer replacement therapy as ordered  Outcome: Progressing      ordered  Outcome: Progressing

## 2024-10-08 NOTE — DISCHARGE INSTRUCTIONS
To follow up with an outpatient Registered Dietitian:    At [facility] we offer nutrition counseling for outpatients designed to cater to individual needs, lifestyle and goals. Consultations are provided on a variety of nutrition-related diseases and concerns including, but not limited to, heart health, weight management, GI, renal and healthy eating.   To make an appointment contact central scheduling  at [phone]. Further questions? Contact the outpatient RD [phone].     Follow up for Diabetes care:    It is recommended that you follow up with an outpatient diabetes center.   Please obtain a referral from your primary care doctor if you do not already have one.   Below is the location and number of the [facility] nearest you.     [place] location:  [address] [place] [address] [address]  [phone]    [place] location:  [address] [facility] [address]  [phone]    [place] location:  [address]   [phone]    [place] location:  [address] [facility]  [phone]    [place] location:  [address] [facility]  [phone]    [place] location:  [address] [facility]  [phone]      Going Home    In this section you will find the tools which will guide you through the first few days after you leave the hospital. Continued use of these tools will help you develop the skills necessary to keep your heart failure under control.     Heart Failure Guidelines - place this worksheet on your refrigerator or somewhere you can refer to it daily to help you decide if your symptoms are under control, and what to do if they are not.     Home Care Instructions Following Heart Failure - the most important things to do every day include:     Weigh yourself  Take your medicines as prescribed  Limit your sodium (salt) and fluid intake  Know when to call your cardiologist, primary doctor, or nurse  Know when to seek emergency care    There is also a handy weight chart on which to record your weight every  day, information on measuring fluids and limiting fluid intake, and a section for recording your thoughts or questions.     Things for You to Remember:   1. An appointment has been made for you to see your doctor or healthcare provider within 7 days of hospital discharge. It is important that you attend this appointment to make sure your symptoms are under control.     2. Your recommended sodium intake is 7684-0206 mg daily    3. Limit your fluid intake to no more than 2 liters or 64 ounces per day    4. Some exercise and activity is important to help keep your heart functioning and strong. Unless instructed not to exercise, you may walk at a slow to moderate pace for 10-15 minutes 2-3 days per week to start. Pace your activity to prevent shortness of breath or fatigue. Stop exercise if you develop chest pain, lightheadedness, or significant shortness of breath.       Call Your Cardiologist If:   You gain 2 pounds overnight or 3-4 pounds in 3-5 days  You have more difficulty breathing  You are getting more tired with normal activity  You are more short of breath lying down, or awaken at night short of breath  You have swelling of your feet or legs  You urinate less often during the day and more often at night  You have cramps in your legs  You have blurred vision or see yellowish-green halos around objects of lights    Go to the Emergency Room If:   You have pain or tightness in your chest  You are extremely short of breath  You are coughing up pink-frothy mucus  You are traveling and develop symptoms of worsening heart failure    Additional Resources:   If you have questions or concerns about heart failure management, call the [facility] Heart Failure Unit at [phone]

## 2024-10-09 VITALS
HEART RATE: 72 BPM | OXYGEN SATURATION: 96 % | HEIGHT: 65 IN | BODY MASS INDEX: 24.32 KG/M2 | RESPIRATION RATE: 16 BRPM | WEIGHT: 145.94 LBS | SYSTOLIC BLOOD PRESSURE: 128 MMHG | TEMPERATURE: 98 F | DIASTOLIC BLOOD PRESSURE: 83 MMHG

## 2024-10-09 PROBLEM — I13.10 CARDIORENAL SYNDROME: Status: ACTIVE | Noted: 2024-10-09

## 2024-10-09 LAB
ANION GAP SERPL CALC-SCNC: 8 MMOL/L (ref 0–18)
BUN BLD-MCNC: 73 MG/DL (ref 9–23)
CALCIUM BLD-MCNC: 9.3 MG/DL (ref 8.7–10.4)
CHLORIDE SERPL-SCNC: 100 MMOL/L (ref 98–112)
CO2 SERPL-SCNC: 27 MMOL/L (ref 21–32)
CREAT BLD-MCNC: 3.73 MG/DL
EGFRCR SERPLBLD CKD-EPI 2021: 18 ML/MIN/1.73M2 (ref 60–?)
GLUCOSE BLD-MCNC: 120 MG/DL (ref 70–99)
GLUCOSE BLD-MCNC: 137 MG/DL (ref 70–99)
GLUCOSE BLD-MCNC: 97 MG/DL (ref 70–99)
GLUCOSE BLD-MCNC: 99 MG/DL (ref 70–99)
OSMOLALITY SERPL CALC.SUM OF ELEC: 301 MOSM/KG (ref 275–295)
POTASSIUM SERPL-SCNC: 5.1 MMOL/L (ref 3.5–5.1)
SODIUM SERPL-SCNC: 135 MMOL/L (ref 136–145)

## 2024-10-09 PROCEDURE — 99239 HOSP IP/OBS DSCHRG MGMT >30: CPT | Performed by: HOSPITALIST

## 2024-10-09 PROCEDURE — 99233 SBSQ HOSP IP/OBS HIGH 50: CPT | Performed by: INTERNAL MEDICINE

## 2024-10-09 RX ORDER — ISOSORBIDE MONONITRATE 30 MG/1
30 TABLET, EXTENDED RELEASE ORAL DAILY
Qty: 30 TABLET | Refills: 11 | Status: SHIPPED | OUTPATIENT
Start: 2024-10-10

## 2024-10-09 RX ORDER — HYDRALAZINE HYDROCHLORIDE 25 MG/1
25 TABLET, FILM COATED ORAL 2 TIMES DAILY
Status: DISCONTINUED | OUTPATIENT
Start: 2024-10-09 | End: 2024-10-09

## 2024-10-09 RX ORDER — CARVEDILOL 12.5 MG/1
12.5 TABLET ORAL 2 TIMES DAILY WITH MEALS
Qty: 60 TABLET | Refills: 11 | Status: SHIPPED | OUTPATIENT
Start: 2024-10-09

## 2024-10-09 RX ORDER — TORSEMIDE 20 MG/1
20 TABLET ORAL DAILY
Qty: 30 TABLET | Refills: 11 | Status: SHIPPED | OUTPATIENT
Start: 2024-10-09

## 2024-10-09 RX ORDER — TORSEMIDE 20 MG/1
20 TABLET ORAL DAILY
Status: DISCONTINUED | OUTPATIENT
Start: 2024-10-09 | End: 2024-10-09

## 2024-10-09 RX ORDER — HYDRALAZINE HYDROCHLORIDE 25 MG/1
25 TABLET, FILM COATED ORAL 2 TIMES DAILY
Qty: 60 TABLET | Refills: 11 | Status: SHIPPED | OUTPATIENT
Start: 2024-10-09

## 2024-10-09 NOTE — PAYOR COMM NOTE
--------------  CONTINUED STAY REVIEW    Payor: [employer]  Subscriber #:  [id number]  Authorization Number: [id number]    Admit date: 10/5/24  Admit time:  4:55 PM    REVIEW DOCUMENTATION:   10-9-24     Assessment and Plan:     1) CKD 4- Cr 1.5 2/23 -> 2.6 mg/dl 6/24, now > 3 mg/dl due to progressive diabetic nephropathy +/- cardiorenal syndrome. UA remains bland with modest isolated proteinuria. Focus on DM / HTN / HF mgmt     2) DM 2 > 25 yrs with triopathy- A1C has improved recently; no need to change insulin regimen      3) CAD s/p CABG 2006     4) Longstanding HTN- on amlodipine / losartan / coreg PTA     5) Ischemic CM EF 40% now with recurrent CHF- previously on SGLT2-I (dc'ed due to cost). Diuresed- now euvolemic.     General: awake alert  HEENT: No scleral icterus, MMM  Neck: Supple, no EBONY or thyromegaly  Cardiac: Regular rate and rhythm, S1, S2 normal, no murmur or tub  Lungs: Decreased BS at bases bilaterally   Abdomen: Soft, non-tender. + bowel sounds, no palpable organomegaly  Extremities: Without clubbing, cyanosis; no edema  Neurologic: Cranial nerves grossly intact, moving all extremities  Skin: Warm and dry, no rashes        Labs:         Lab Results   Component Value Date     CREATSERUM 3.73 10/09/2024     BUN 73 10/09/2024      10/09/2024     K 5.1 10/09/2024      10/09/2024     CO2 27.0 10/09/2024     GLU 97 10/09/2024     CA 9.3 10/09/2024     PGLU 99 10/09/2024             MEDICATIONS ADMINISTERED IN LAST 1 DAY:  aspirin tab 325 mg       Date Action Dose Route User    10/8/2024 2137 Given 325 mg Oral Fanta Ferrell RN          atorvastatin (Lipitor) tab 40 mg       Date Action Dose Route User    10/8/2024 2137 Given 40 mg Oral Fanta Ferrell RN          carvedilol (Coreg) tab 12.5 mg       Date Action Dose Route User    10/9/2024 0916 Given 12.5 mg Oral Lyndsey Stout RN    10/8/2024 1713 Given 12.5 mg Oral Fanta Ferrell RN          clopidogrel (Plavix) tab 75  mg       Date Action Dose Route User    10/8/2024 2137 Given 75 mg Oral Fanta Ferrell RN          ezetimibe (Zetia) tab 10 mg       Date Action Dose Route User    10/8/2024 2136 Given 10 mg Oral Fanta Ferrell RN          heparin (Porcine) 5000 UNIT/ML injection 5,000 Units       Date Action Dose Route User    10/9/2024 0916 Given 5,000 Units Subcutaneous (Left Lower Abdomen) Lyndsey Stout RN    10/8/2024 2136 Given 5,000 Units Subcutaneous (Right Upper Abdomen) Fanta Ferrell RN          hydrALAZINE (Apresoline) tab 25 mg       Date Action Dose Route User    10/9/2024 0540 Given 25 mg Oral Otilia Kramer RN    10/8/2024 2137 Given 25 mg Oral Fanta Ferrell RN    10/8/2024 1448 Given 25 mg Oral Radha Colin RN          isosorbide mononitrate ER (Imdur) 24 hr tab 30 mg       Date Action Dose Route User    10/9/2024 0916 Given 30 mg Oral Lyndsey Stout RN            Vitals (last day)       Date/Time Temp Pulse Resp BP SpO2 Weight O2 Device O2 Flow Rate (L/min) [facility]    10/09/24 0749 98 °F (36.7 °C) -- 16 -- -- -- None (Room air) -- VM    10/09/24 0535 -- -- -- 150/82 -- -- -- -- ES    10/09/24 0516 -- -- -- 154/93 -- -- -- -- ES    10/09/24 0516 97.9 °F (36.6 °C) 79 18 -- 93 % 145 lb 15.1 oz (66.2 kg) None (Room air) 0 L/min BR    10/08/24 2333 97.6 °F (36.4 °C) 74 18 137/76 96 % -- None (Room air) 0 L/min BR    10/08/24 2023 97.7 °F (36.5 °C) 68 18 132/77 95 % -- None (Room air) 0 L/min BR    10/08/24 1144 97.5 °F (36.4 °C) 67 20 137/82 96 % -- None (Room air) -- LS    10/08/24 1000 -- 69 -- 110/75 96 % -- -- -- CD    10/08/24 0753 97.6 °F (36.4 °C) 72 20 148/89 96 % -- None (Room air) -- LS    10/08/24 0440 97.4 °F (36.3 °C) 76 18 131/93 99 % 148 lb 5.9 oz (67.3 kg) None (Room air) 0 L/min LINDA FALLON Scores (since admission)       None

## 2024-10-09 NOTE — PROGRESS NOTES
[facility]  Division of Cardiology  Progress Note    Roldan Mast Patient Status:  Inpatient    1960 MRN [id number]   Location [facility] 2NE-A Attending Michaelle Raines MD   Hosp Day # 4 PCP Say Ravi MD   Assessment:  SOB/Acute on chronic systolic congestive heart failure  Bilateral pleural effusions  Troponin negative  VQ neg for PE  Echo with EF now 35-40% range (from 45% range)  CAD, PCI x 2 and CABG   Dyslipidemia  DM, insulin dependent  HTN  CKD3, follows with [facility] CHRISTINA, baseline Cr 1.5  JOHN, Cr 3.7 today from baseline 1.5  Anemia, Hgb 12.      Plan:  CHF: EF down a bit.  Although angio would be reasonable, given Cr no angio/cath for now.  Likewise holding Losartan/Entresto. Holding diuresis given Cr. Continue Coreg for now.  But breathing much improved and on RA.  CAD: DAPT, Consider cath in the future if his Cr improves given his reduced EF and prior CAD/PCI.  CKD: Labs in AM, holding toxins, Cr 3.5 today.  [facility] on consult.  Lipids: Lipitor, zetia.  Goal LDL<55.  DM: Per primary team.  HTN/CHF meds: After discussing with the patient, his wife and Dr. Sarah, we will: 1. DC amlodipine, 2. decrease to Coreg 12.5 BID, 3. initiate Nitrates/hydralizine at modest dose (Imdur 30 daily, Hydralizine 25mg TID) to start slowly.  BP improved with current regimen.  Would continue at current doses for now and allow things to equilibrate.  This may take a few days. Overall Would stay on nitrates/hydralizine untiil Cr returns to baseline and be very judicious if ARB/ARNI restarted.    Ok to DC if nephrology ok with Cr not yet downtrending (plateau currently).  Would send on current meds.  FU with Dr. Sarah and Dr. Duran in 2-3 weeks.  I would suggest labs next week but defer to Dr. Sarah.             Subjective:  No overnight events.  The patient denies any significant chest pain or dyspnea.  Notes his breathing is \"much better\".   Cr up a bit over yesterday.  3.65->3.7.  Want to go home.    Objective:  /83 (BP Location: Right arm)   Pulse 72   Temp 97.7 °F (36.5 °C) (Oral)   Resp 16   Ht 65\"   Wt 145 lb 15.1 oz (66.2 kg)   SpO2 96%   BMI 24.29 kg/m²   Temp (24hrs), Av.8 °F (36.6 °C), Min:97.6 °F (36.4 °C), Max:98 °F (36.7 °C)      Intake/Output Summary (Last 24 hours) at 10/9/2024 1752  Last data filed at 10/9/2024 1600  Gross per 24 hour   Intake 530 ml   Output 1625 ml   Net -1095 ml     Wt Readings from Last 3 Encounters:   10/09/24 145 lb 15.1 oz (66.2 kg)   10/05/24 160 lb (72.6 kg)   24 153 lb 8 oz (69.6 kg)       Physical exam:  General: Well developed, well nourished male.  Pt is in no acute distress.  Neck:  No JVD.  Supple with normal ROM.  Cardiac: Regular rate and rhythm.  No murmurs, rubs, or gallops.   Lungs: Clear to ascultation bilaterally.    Abdomen: Soft.  Non-distended.  Non-tender.  Bowel sounds are present.  No guarding or rebound.   Extremities: Warm, no significant edema.  Palpable pulses.  Neurologic: Alert and oriented, normal affect.  No gross deficit appreciated.  Integument:  No visible rashes are appreciated.  Psych: The affect is appropriate.    Meds:    hydrALAZINE  25 mg Oral BID    torsemide  20 mg Oral Daily    carvedilol  12.5 mg Oral BID with meals    isosorbide mononitrate ER  30 mg Oral Daily    heparin  5,000 Units Subcutaneous 2 times per day    insulin aspart  1-10 Units Subcutaneous TID AC and HS    insulin aspart  1-68 Units Subcutaneous TID CC    aspirin  325 mg Oral Nightly    atorvastatin  40 mg Oral Nightly    clopidogrel  75 mg Oral Nightly    ezetimibe  10 mg Oral Nightly       Laboratory Data:  Lab Results   Component Value Date     10/09/2024    K 5.1 10/09/2024     10/09/2024    CO2 27.0 10/09/2024    BUN 73 10/09/2024    CREATSERUM 3.73 10/09/2024    GLU 97 10/09/2024    CA 9.3 10/09/2024          Lab Results   Component Value Date    INR 1.0 09/10/2012       Telemetry: Sinus.   Unremarkable.        Thank you for allowing our group to care for your patient. Please contact me with any questions.   Denver Benitez MD  [phone]

## 2024-10-09 NOTE — PROGRESS NOTES
[facility]   part of [facility]     Hospitalist Progress Note     Roldan Mast Patient Status:  Inpatient    1960 MRN [id number]   Location [facility] 2NE-A Attending Michaelle Raines MD   Hosp Day # 4 PCP Say Ravi MD     Chief Complaint: Shortness of breath    Subjective:     No acute events, no new complaints.    Objective:    Review of Systems:   A comprehensive review of systems was completed; pertinent positive and negatives stated in subjective.    Vital signs:  Temp:  [97.6 °F (36.4 °C)-98 °F (36.7 °C)] 97.7 °F (36.5 °C)  Pulse:  [68-79] 79  Resp:  [16-18] 18  BP: (132-154)/(76-93) 150/82  SpO2:  [93 %-96 %] 93 %    Physical Exam:    /82 (BP Location: Left arm)   Pulse 79   Temp 97.7 °F (36.5 °C) (Oral)   Resp 18   Ht 5' 5\" (1.651 m)   Wt 145 lb 15.1 oz (66.2 kg)   SpO2 93%   BMI 24.29 kg/m²     General: No acute distress.   HEENT: Moist mucous membranes.  Mild scleral redness no crusting seen at this time but patient and family states he felt crusting and tearing at night.  No pharyngeal erythema seen at this time  Respiratory: Decreased breath sounds at bases otherwise clear to auscultation bilateral  Cardiovascular: S1, S2.  Regular rate and rhythm.   Abdomen: Soft, nontender, nondistended.  Positive bowel sounds.  Neurologic: No focal neurological deficits.  Musculoskeletal: Full range of motion of all extremities.  Trace bilateral pitting pedal edema improved since admission.  No calf tenderness  Integument: Keloid present over healed sternal scar from previous CABG  Psychiatric: Appropriate mood and affect.    Diagnostic Data:    Labs:  Recent Labs   Lab 10/05/24  1120 10/06/24  0824 10/07/24  0938   WBC 9.9 6.8 8.8   HGB 11.7* 11.3* 12.8*   MCV 82.4 84.0 81.3   .0 214.0 248.0       Recent Labs   Lab 10/05/24  1120 10/06/24  0824 10/07/24  0938 10/08/24  0602 10/09/24  0711   *   < > 108* 88 97   BUN 52*   < > 61* 69* 73*   CREATSERUM 3.33*   < >  3.49* 3.65* 3.73*   CA 9.2   < > 9.6 9.0 9.3   ALB 4.1  --   --  4.0  --    *   < > 134* 133* 135*   K 5.1   < > 4.9 4.5 5.1      < > 101 101 100   CO2 24.0   < > 26.0 25.0 27.0   ALKPHO 154*  --   --  149*  --    AST 15  --   --  14  --    ALT 22  --   --  19  --    BILT 0.4  --   --  0.5  --    TP 7.2  --   --  7.0  --     < > = values in this interval not displayed.       Estimated Creatinine Clearance: 17.9 mL/min (A) (based on SCr of 3.73 mg/dL (H)).    Recent Labs   Lab 10/05/24  1120 10/05/24  1323 10/06/24  0824   TROPHS 14 14 11       No results for input(s): \"PTP\", \"INR\" in the last 168 hours.               Microbiology    No results found for this visit on 10/05/24.      Imaging: Reviewed in Epic.    Medications:    hydrALAZINE  25 mg Oral BID    torsemide  20 mg Oral Daily    carvedilol  12.5 mg Oral BID with meals    isosorbide mononitrate ER  30 mg Oral Daily    heparin  5,000 Units Subcutaneous 2 times per day    insulin aspart  1-10 Units Subcutaneous TID AC and HS    insulin aspart  1-68 Units Subcutaneous TID CC    aspirin  325 mg Oral Nightly    atorvastatin  40 mg Oral Nightly    clopidogrel  75 mg Oral Nightly    ezetimibe  10 mg Oral Nightly       Assessment & Plan:        #Acute on chronic CHF exacerbation, with exertional shortness of breath and chest discomfort and bilateral pleural effusion  #Bilateral pleural effusion with atelectasis  CHF protocol  IV Lasix given from the emergency room, with plans to repeat BMP in a.m. before adding more diuretics as kidney function test  elevated to 3.33 on admission.  Creatinine 3.3 today.  Discussed with nephrology, diuresis per nephrology and cardiology.  Patient looks more euvolemic at this time per nephrology today  Daily weight  I/O chart  proBNP elevated on admission  Troponin negative x 2  VQ scan with low probability of PE  CT chest without contrast showed moderate bilateral pleural effusion with adjacent areas of passive  atelectasis  Incentive spirometry   2D echo done on 10/6/2024 showed EF of 35 to 40%.  Mild to moderate diffuse hypokinesis.  Hypokinesis of inferior wall.  Grade 1 diastolic dysfunction.  Cardiology consult  Symptoms improving today  Bilateral large pleural effusion-pulmonary consult in case needs thoracentesis for the large pulmonary effusion if unresponsive to diuresis.  Diuresis limited by patient's elevated kidney function test.  # chronic kidney disease stage IV per nephrology  On chart review patient had a creatinine of 1.52 on GFR of 52 on 2/13/2023.  Creatinine of 3.33 with a GFR of 28 on admission  Follow BMP in a.m.  Nephrology consult  #Anemia of chronic kidney disease stage IV  Hgb stable  #Diabetes type 2 with hyperglycemia and hypoglycemia and CKD stage 4  Hyperglycemia and hypoglycemia protocol  Follow Accu-Cheks  Initially on admission started on Tresiba long-acting insulin at a lower dose than at home as kidney function test elevated-patient usually takes long-acting insulin 28 units twice daily at home and was only started on 28 units once daily on 10/5/2024 on admission to hospital and blood sugar still noted to be on lower range  in hospital.  Decreased Tresiba to half the dose to 14 units daily on 10/6/2024, will decrease Tresiba dose further to 7 units daily on 10/7/2024 and discontinued Tresiba completely on 10/8/2024.  Continue to follow Zanderu-Rubén, BS CONTROLLED OFF LONG ACTING INSULIN AT THIS TIME  A1c 7.4  Discussed with patient and family regarding lower insulin needs with progressive kidney disease with worsening kidney function test and need to closely monitor blood sugar and follow-up with regular outpatient primary care physician  as outpatient also.  Will continue to monitor blood sugars and labs in hospital  Continue NovoLog carb counting and correction factor at this time  #Hypertension with hypertensive urgency on admission  Blood pressure and cardiac medications being adjusted  by cardiology and nephrology in hospital   No losartan at this time per cardiology due to elevated kidney function test  Blood pressure not controlled at home either according to patient and patient's family at bedside  Patient received IV labetalol from the emergency room along with IV Lasix   IV hydralazine as needed  BP improved with med adjustments  #Hyperlipidemia  Continue home statin and Zetia  #CAD with previous CABG and stents  Monitor on telemetry  Aspirin, statin, Coreg, Plavix  Cardiology consult, cardiology contemplating cath in future if kidney function test improves given patient's reduced ejection fraction and prior CAD/PCI.  #Sore throat, congestion and difficulty bringing up phlegm, itchy and watery eyes-rule out viral syndrome  RVP negative  Robitussin as needed  Mucinex twice daily     Ahsan Key MD      Supplementary Documentation:     Quality:  DVT Mechanical Prophylaxis:   SCDs,    DVT Pharmacologic Prophylaxis   Medication    heparin (Porcine) 5000 UNIT/ML injection 5,000 Units    DVT Pharmacologic prophylaxis: Aspirin 325 mg           Code Status: Not on file  John: No urinary catheter in place  John Duration (in days):   Central line:    CELENA:     Discharge is dependent on: Clinical progress  At this point Mr. Mast is expected to be discharge to: Home    The 21st Century Cures Act makes medical notes like these available to patients in the interest of transparency. Please be advised this is a medical document. Medical documents are intended to carry relevant information, facts as evident, and the clinical opinion of the practitioner. The medical note is intended as peer to peer communication and may appear blunt or direct. It is written in medical language and may contain abbreviations or verbiage that are unfamiliar.

## 2024-10-09 NOTE — PROGRESS NOTES
[facility]  Nephrology Progress Note    Roldan Mast Attending:  Michaelle Raines MD       Assessment and Plan:    1) CKD 4- Cr 1.5  -> 2.6 mg/dl , now > 3 mg/dl due to progressive diabetic nephropathy +/- cardiorenal syndrome. UA remains bland with modest isolated proteinuria. Focus on DM / HTN / HF mgmt     2) DM 2 > 25 yrs with triopathy- A1C has improved recently; no need to change insulin regimen      3) CAD s/p CABG      4) Longstanding HTN- on amlodipine / losartan / coreg PTA     5) Ischemic CM EF 40% now with recurrent CHF- previously on SGLT2-I (dc'ed due to cost). Diuresed- now euvolemic.      Anticipate dc home today on imdur 30 mg daily + TRUJILLO 25 mg bid + torsemide 20 mg daily + coreg 12.5 mg bid; dc amlodipine / losartan / furosemide. To check labs and f/u in 2-3 weeks.       Subjective:  Awake alert feels \"great\" no c/o     Physical Exam:   /82 (BP Location: Left arm)   Pulse 79   Temp 98 °F (36.7 °C) (Oral)   Resp 16   Ht 5' 5\" (1.651 m)   Wt 145 lb 15.1 oz (66.2 kg)   SpO2 93%   BMI 24.29 kg/m²   Temp (24hrs), Av.7 °F (36.5 °C), Min:97.5 °F (36.4 °C), Max:98 °F (36.7 °C)       Intake/Output Summary (Last 24 hours) at 10/9/2024 0935  Last data filed at 10/9/2024 0901  Gross per 24 hour   Intake 890 ml   Output 1425 ml   Net -535 ml     Wt Readings from Last 3 Encounters:   10/09/24 145 lb 15.1 oz (66.2 kg)   10/05/24 160 lb (72.6 kg)   24 153 lb 8 oz (69.6 kg)     General: awake alert  HEENT: No scleral icterus, MMM  Neck: Supple, no EBONY or thyromegaly  Cardiac: Regular rate and rhythm, S1, S2 normal, no murmur or tub  Lungs: Decreased BS at bases bilaterally   Abdomen: Soft, non-tender. + bowel sounds, no palpable organomegaly  Extremities: Without clubbing, cyanosis; no edema  Neurologic: Cranial nerves grossly intact, moving all extremities  Skin: Warm and dry, no rashes       Labs:   Lab Results   Component Value Date    CREATSERUM 3.73 10/09/2024    BUN  73 10/09/2024     10/09/2024    K 5.1 10/09/2024     10/09/2024    CO2 27.0 10/09/2024    GLU 97 10/09/2024    CA 9.3 10/09/2024    PGLU 99 10/09/2024       Imaging:  All imaging studies reviewed.    Meds:   Current Facility-Administered Medications   Medication Dose Route Frequency    hydrALAZINE (Apresoline) tab 25 mg  25 mg Oral Q8H ARTHUR    carvedilol (Coreg) tab 12.5 mg  12.5 mg Oral BID with meals    isosorbide mononitrate ER (Imdur) 24 hr tab 30 mg  30 mg Oral Daily    glucose (Dex4) 15 GM/59ML oral liquid 15 g  15 g Oral Q15 Min PRN    Or    glucose (Glutose) 40% oral gel 15 g  15 g Oral Q15 Min PRN    Or    glucose-vitamin C (Dex-4) chewable tab 4 tablet  4 tablet Oral Q15 Min PRN    Or    dextrose 50% injection 50 mL  50 mL Intravenous Q15 Min PRN    Or    glucose (Dex4) 15 GM/59ML oral liquid 30 g  30 g Oral Q15 Min PRN    Or    glucose (Glutose) 40% oral gel 30 g  30 g Oral Q15 Min PRN    Or    glucose-vitamin C (Dex-4) chewable tab 8 tablet  8 tablet Oral Q15 Min PRN    heparin (Porcine) 5000 UNIT/ML injection 5,000 Units  5,000 Units Subcutaneous 2 times per day    acetaminophen (Tylenol Extra Strength) tab 500 mg  500 mg Oral Q4H PRN    melatonin tab 3 mg  3 mg Oral Nightly PRN    ondansetron (Zofran) 4 MG/2ML injection 4 mg  4 mg Intravenous Q6H PRN    insulin aspart (NovoLOG) 100 Units/mL FlexPen 1-10 Units  1-10 Units Subcutaneous TID AC and HS    insulin aspart (NovoLOG) 100 Units/mL FlexPen 1-68 Units  1-68 Units Subcutaneous TID CC    influenza virus trivalent PF (Fluzone Trivalent) 0.5 mL IM injection (ages 6 months to 64 years) 0.5 mL  0.5 mL Intramuscular Prior to discharge    hydrALAzine (Apresoline) 20 mg/mL injection 5 mg  5 mg Intravenous Q4H PRN    aspirin tab 325 mg  325 mg Oral Nightly    atorvastatin (Lipitor) tab 40 mg  40 mg Oral Nightly    clopidogrel (Plavix) tab 75 mg  75 mg Oral Nightly    ezetimibe (Zetia) tab 10 mg  10 mg Oral Nightly         Questions/concerns were  discussed with patient and/or family by bedside.          Luiza Sarah MD  10/9/2024  8:33 AM

## 2024-10-09 NOTE — PLAN OF CARE
Pt denies c/o pain, malaise, or cardiac symptoms. A&Ox4. Lungs clear bilaterally with equal expansion, on room air. Pt NSR on monitor with regular rate. Abdomen soft and non-tender with active bowel sounds in all four quadrants. Continent of B&B. Pt and family members updated with plan of care.    Problem: Patient/Family Goals  Goal: Patient/Family Long Term Goal  Description: Patient's Long Term Goal: go home    Interventions:  - echo, IV lasix  - See additional Care Plan goals for specific interventions  Outcome: Progressing  Goal: Patient/Family Short Term Goal  Description: Patient's Short Term Goal: feel better    Interventions:   - echo, IV lasix  - See additional Care Plan goals for specific interventions  Outcome: Progressing     Problem: CARDIOVASCULAR - ADULT  Goal: Maintains optimal cardiac output and hemodynamic stability  Description: INTERVENTIONS:  - Monitor vital signs, rhythm, and trends  - Monitor for bleeding, hypotension and signs of decreased cardiac output  - Evaluate effectiveness of vasoactive medications to optimize hemodynamic stability  - Monitor arterial and/or venous puncture sites for bleeding and/or hematoma  - Assess quality of pulses, skin color and temperature  - Assess for signs of decreased coronary artery perfusion - ex. Angina  - Evaluate fluid balance, assess for edema, trend weights  Outcome: Progressing  Goal: Absence of cardiac arrhythmias or at baseline  Description: INTERVENTIONS:  - Continuous cardiac monitoring, monitor vital signs, obtain 12 lead EKG if indicated  - Evaluate effectiveness of antiarrhythmic and heart rate control medications as ordered  - Initiate emergency measures for life threatening arrhythmias  - Monitor electrolytes and administer replacement therapy as ordered  Outcome: Progressing

## 2024-10-09 NOTE — PLAN OF CARE
Assumed care of pt @2330. Pt axox4. Denies pain or sob at present time. Sinus rhythm on tele. Lungs clear. RA O2 sat 94%. Abdomen soft, bs +4. Pt voiding without difficulty. Refusing scds on heparin subQ.  Plan  -continue to monitor pt on tele  -daily weights, fluid restriction, strict I and O's  -labs in am  -updated pt on poc for the night and am. Instructed pt to call for any pain or needs. Pt verbalized understanding. Denies needs at this time. Call light within reach. Wife at bedside.   Problem: CARDIOVASCULAR - ADULT  Goal: Maintains optimal cardiac output and hemodynamic stability  Description: INTERVENTIONS:  - Monitor vital signs, rhythm, and trends  - Monitor for bleeding, hypotension and signs of decreased cardiac output  - Evaluate effectiveness of vasoactive medications to optimize hemodynamic stability  - Monitor arterial and/or venous puncture sites for bleeding and/or hematoma  - Assess quality of pulses, skin color and temperature  - Assess for signs of decreased coronary artery perfusion - ex. Angina  - Evaluate fluid balance, assess for edema, trend weights  Outcome: Progressing  Goal: Absence of cardiac arrhythmias or at baseline  Description: INTERVENTIONS:  - Continuous cardiac monitoring, monitor vital signs, obtain 12 lead EKG if indicated  - Evaluate effectiveness of antiarrhythmic and heart rate control medications as ordered  - Initiate emergency measures for life threatening arrhythmias  - Monitor electrolytes and administer replacement therapy as ordered  Outcome: Progressing

## 2024-10-09 NOTE — PROGRESS NOTES
[facility]  Division of Cardiology  Progress Note    Roldan Mast Patient Status:  Inpatient    1960 MRN [id number]   Location [facility] 2NE-A Attending Michaelle Raines MD   Hosp Day # 4 PCP Say Ravi MD   Assessment:  SOB/Acute on chronic systolic congestive heart failure  Bilateral pleural effusions  Troponin negative  VQ neg for PE  Echo with EF now 35-40% range (from 45% range)  CAD, PCI x 2 and CABG   Dyslipidemia  DM, insulin dependent  HTN  CKD3, follows with [facility] CHRISTINA, baseline Cr 1.5  JOHN, Cr 3.73 today from baseline 1.5  Anemia, Hgb 12.      Plan:  CHF: EF down a bit.  Although angio would be reasonable, given Cr no angio/cath for now.  Likewise holding Losartan/Entresto. Holding diuresis given Cr. Continue Coreg for now.  But breathing much improved and on RA.  CAD: DAPT, Consider cath in the future if his Cr improves given his reduced EF and prior CAD/PCI.  CKD: Labs in AM, holding toxins, Cr 3.5 today.  [facility] on consult.  Lipids: Lipitor, zetia.  Goal LDL<55.  DM: Per primary team.  HTN/CHF meds: After discussing with the patient, his wife and Dr. Sarah, we will: 1. DC amlodipine, 2. decrease to Coreg 12.5 BID, 3. initiate Nitrates/hydralizine at modest dose (Imdur 30 daily, Hydralizine 25mg TID) to start slowly.   Okay to discharge from CV standpoint with close OP follow up next week.            Subjective:  No overnight events.  The patient denies any significant chest pain or dyspnea.  Notes his breathing is \"much better\".   Cr up a bit over yesterday.    Objective:  /82 (BP Location: Left arm)   Pulse 79   Temp 97.7 °F (36.5 °C) (Oral)   Resp 18   Ht 5' 5\" (1.651 m)   Wt 145 lb 15.1 oz (66.2 kg)   SpO2 93%   BMI 24.29 kg/m²   Temp (24hrs), Av.8 °F (36.6 °C), Min:97.6 °F (36.4 °C), Max:98 °F (36.7 °C)      Intake/Output Summary (Last 24 hours) at 10/9/2024 1410  Last data filed at 10/9/2024 0901  Gross per 24 hour    Intake 290 ml   Output 1425 ml   Net -1135 ml     Wt Readings from Last 3 Encounters:   10/09/24 145 lb 15.1 oz (66.2 kg)   10/05/24 160 lb (72.6 kg)   06/27/24 153 lb 8 oz (69.6 kg)       Physical exam:  General: Well developed, well nourished male.  Pt is in no acute distress.  Neck:  No JVD.  Supple with normal ROM.  Cardiac: Regular rate and rhythm.  No murmurs, rubs, or gallops.   Lungs: Clear to ascultation bilaterally.    Abdomen: Soft.  Non-distended.  Non-tender.  Bowel sounds are present.  No guarding or rebound.   Extremities: Warm, no significant edema.  Palpable pulses.  Neurologic: Alert and oriented, normal affect.  No gross deficit appreciated.  Integument:  No visible rashes are appreciated.  Psych: The affect is appropriate.    Meds:    hydrALAZINE  25 mg Oral BID    torsemide  20 mg Oral Daily    carvedilol  12.5 mg Oral BID with meals    isosorbide mononitrate ER  30 mg Oral Daily    heparin  5,000 Units Subcutaneous 2 times per day    insulin aspart  1-10 Units Subcutaneous TID AC and HS    insulin aspart  1-68 Units Subcutaneous TID CC    aspirin  325 mg Oral Nightly    atorvastatin  40 mg Oral Nightly    clopidogrel  75 mg Oral Nightly    ezetimibe  10 mg Oral Nightly       Laboratory Data:  Lab Results   Component Value Date     10/09/2024    K 5.1 10/09/2024     10/09/2024    CO2 27.0 10/09/2024    BUN 73 10/09/2024    CREATSERUM 3.73 10/09/2024    GLU 97 10/09/2024    CA 9.3 10/09/2024          Lab Results   Component Value Date    INR 1.0 09/10/2012       Telemetry: Sinus.  Unremarkable.        Thank you for allowing our group to care for your patient. Please contact me with any questions.   RUSS Camilo  10/9/2024  2:13 PM    [phone]

## 2024-10-10 ENCOUNTER — PATIENT OUTREACH (OUTPATIENT)
Dept: CASE MANAGEMENT | Age: 64
End: 2024-10-10

## 2024-10-10 NOTE — PROGRESS NOTES
Attempted to contact pt for condition update however no answer. Call rang a few times, then went silent. No response and then called ended.  Unable to leave a VM at this time. NCM to try again at a later time.

## 2024-10-10 NOTE — PROGRESS NOTES
Hospital follow up.    TCC request.    Attempt #1:  Left message on voicemail for patient to call transitions specialist back to schedule follow up appointments. Provided Transitions specialist scheduling phone number [phone].

## 2024-10-10 NOTE — PLAN OF CARE
Pt discharged home. IV removed, tele dc'd and returned to monitor tech. F/U instructions provided and discussed. Pt and family verbalized understanding. Rx given. Discussed adverse reactions and side effects of all new medications and provided appropriate handouts. Pt and family voiced understanding. Pt wheeled down by staff with all belongings. Pt left denying c/o pain, malaise, or cardiac symptoms. All needs met by staff.

## 2024-10-11 NOTE — PROGRESS NOTES
Hospital follow up.    TCC request.    Attempt #2:  Left message on voicemail for patient to call transitions specialist back to schedule follow up appointments. Provided Transitions specialist scheduling phone number [phone].

## 2024-10-14 NOTE — PROGRESS NOTES
Hospital follow up.    TCC request.    Attempt #3:  Left message on voicemail for patient to call transitions specialist back to schedule follow up appointments. Provided Transitions specialist scheduling phone number [phone]. Closing encounter. Will re-open if patient returns call.

## 2024-10-24 NOTE — PROGRESS NOTES
Multiple attempts to reach the pt  with no returned phone call. Past recommended HFU timeframe, closing encounter.

## 2024-10-28 ENCOUNTER — LAB ENCOUNTER (OUTPATIENT)
Dept: LAB | Age: 64
End: 2024-10-28
Attending: INTERNAL MEDICINE
Payer: MEDICAID

## 2024-10-28 LAB
ALBUMIN SERPL-MCNC: 4.2 G/DL (ref 3.2–4.8)
ALBUMIN/GLOB SERPL: 1.3 {RATIO} (ref 1–2)
ALP LIVER SERPL-CCNC: 119 U/L
ALT SERPL-CCNC: 27 U/L
ANION GAP SERPL CALC-SCNC: 3 MMOL/L (ref 0–18)
AST SERPL-CCNC: 20 U/L (ref ?–34)
BASOPHILS # BLD AUTO: 0.04 X10(3) UL (ref 0–0.2)
BASOPHILS NFR BLD AUTO: 0.6 %
BILIRUB SERPL-MCNC: 0.5 MG/DL (ref 0.2–1.1)
BUN BLD-MCNC: 63 MG/DL (ref 9–23)
CALCIUM BLD-MCNC: 9.9 MG/DL (ref 8.7–10.4)
CHLORIDE SERPL-SCNC: 110 MMOL/L (ref 98–112)
CHOLEST SERPL-MCNC: 112 MG/DL (ref ?–200)
CO2 SERPL-SCNC: 24 MMOL/L (ref 21–32)
CREAT BLD-MCNC: 3.75 MG/DL
CREAT UR-SCNC: 91.9 MG/DL
EGFRCR SERPLBLD CKD-EPI 2021: 17 ML/MIN/1.73M2 (ref 60–?)
EOSINOPHIL # BLD AUTO: 0.29 X10(3) UL (ref 0–0.7)
EOSINOPHIL NFR BLD AUTO: 4.4 %
ERYTHROCYTE [DISTWIDTH] IN BLOOD BY AUTOMATED COUNT: 12.9 %
FASTING PATIENT LIPID ANSWER: YES
FASTING STATUS PATIENT QL REPORTED: YES
GLOBULIN PLAS-MCNC: 3.3 G/DL (ref 2–3.5)
GLUCOSE BLD-MCNC: 128 MG/DL (ref 70–99)
HCT VFR BLD AUTO: 35.3 %
HDLC SERPL-MCNC: 42 MG/DL (ref 40–59)
HGB BLD-MCNC: 12.2 G/DL
IMM GRANULOCYTES # BLD AUTO: 0.02 X10(3) UL (ref 0–1)
IMM GRANULOCYTES NFR BLD: 0.3 %
LDLC SERPL CALC-MCNC: 53 MG/DL (ref ?–100)
LYMPHOCYTES # BLD AUTO: 1.57 X10(3) UL (ref 1–4)
LYMPHOCYTES NFR BLD AUTO: 23.8 %
MCH RBC QN AUTO: 28.9 PG (ref 26–34)
MCHC RBC AUTO-ENTMCNC: 34.6 G/DL (ref 31–37)
MCV RBC AUTO: 83.6 FL
MICROALBUMIN UR-MCNC: 140.6 MG/DL
MICROALBUMIN/CREAT 24H UR-RTO: 1529.9 UG/MG (ref ?–30)
MONOCYTES # BLD AUTO: 0.54 X10(3) UL (ref 0.1–1)
MONOCYTES NFR BLD AUTO: 8.2 %
NEUTROPHILS # BLD AUTO: 4.15 X10 (3) UL (ref 1.5–7.7)
NEUTROPHILS # BLD AUTO: 4.15 X10(3) UL (ref 1.5–7.7)
NEUTROPHILS NFR BLD AUTO: 62.7 %
NONHDLC SERPL-MCNC: 70 MG/DL (ref ?–130)
OSMOLALITY SERPL CALC.SUM OF ELEC: 304 MOSM/KG (ref 275–295)
PLATELET # BLD AUTO: 210 10(3)UL (ref 150–450)
POTASSIUM SERPL-SCNC: 4.4 MMOL/L (ref 3.5–5.1)
PROT SERPL-MCNC: 7.5 G/DL (ref 5.7–8.2)
PSA SERPL-MCNC: 1.81 NG/ML (ref ?–4)
RBC # BLD AUTO: 4.22 X10(6)UL
SODIUM SERPL-SCNC: 137 MMOL/L (ref 136–145)
TRIGL SERPL-MCNC: 86 MG/DL (ref 30–149)
TSI SER-ACNC: 2.23 MIU/ML (ref 0.55–4.78)
VLDLC SERPL CALC-MCNC: 12 MG/DL (ref 0–30)
WBC # BLD AUTO: 6.6 X10(3) UL (ref 4–11)

## 2024-10-28 PROCEDURE — 82043 UR ALBUMIN QUANTITATIVE: CPT | Performed by: FAMILY MEDICINE

## 2024-10-28 PROCEDURE — 84443 ASSAY THYROID STIM HORMONE: CPT | Performed by: FAMILY MEDICINE

## 2024-10-28 PROCEDURE — 82570 ASSAY OF URINE CREATININE: CPT | Performed by: FAMILY MEDICINE

## 2024-10-28 PROCEDURE — 80061 LIPID PANEL: CPT | Performed by: FAMILY MEDICINE

## 2024-10-28 PROCEDURE — 84153 ASSAY OF PSA TOTAL: CPT | Performed by: FAMILY MEDICINE

## 2024-10-28 PROCEDURE — 36415 COLL VENOUS BLD VENIPUNCTURE: CPT | Performed by: FAMILY MEDICINE

## 2024-10-28 PROCEDURE — 80053 COMPREHEN METABOLIC PANEL: CPT | Performed by: FAMILY MEDICINE

## 2024-10-28 PROCEDURE — 85025 COMPLETE CBC W/AUTO DIFF WBC: CPT | Performed by: FAMILY MEDICINE

## 2024-10-31 ENCOUNTER — OFFICE VISIT (OUTPATIENT)
Dept: NEPHROLOGY | Facility: CLINIC | Age: 64
End: 2024-10-31
Payer: MEDICAID

## 2024-10-31 VITALS — SYSTOLIC BLOOD PRESSURE: 146 MMHG | WEIGHT: 145.25 LBS | DIASTOLIC BLOOD PRESSURE: 64 MMHG | BODY MASS INDEX: 24 KG/M2

## 2024-10-31 DIAGNOSIS — I10 PRIMARY HYPERTENSION: ICD-10-CM

## 2024-10-31 DIAGNOSIS — N18.4 CKD (CHRONIC KIDNEY DISEASE) STAGE 4, GFR 15-29 ML/MIN (HCC): Primary | ICD-10-CM

## 2024-10-31 PROCEDURE — 99214 OFFICE O/P EST MOD 30 MIN: CPT | Performed by: INTERNAL MEDICINE

## 2024-10-31 NOTE — PROGRESS NOTES
Nephrology Progress Note      ASSESSMENT/PLAN:      1) CKD 4- due to diabetic nephropathy with SCr 1.5 2/23 -> 3.7 mg/dl currently with recent addition of loop diuretic after hospitalization for CHF. Previous eval for other causes urevealing. PLAN- to focus on BP / HF / DM mgmt. To check labs and f/u in 3-4 months and proceed with transplant eval at [facility]. 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     5) Ischemic CM EF 45 -> 35% with recent admit for weight gain / SOB / SHAFER / CHF. Sx resolved with torsemide 20 mg daily- CPM.    Consider dc imdur given dizziness / nausea every day 1 hr after dose- will defer to cards.        HPI:   Roldan Mast is a 64 year old male who presents for follow-up of   Chief Complaint   Patient presents with    Hospital F/U     JOHN/CKD          Very pleasant 64-year-old male presents for follow-up for recent hospitalization for congestive heart failure.  Feeling much better now with resolution of shortness of breath dyspnea exertional chest and back discomfort.  Now can walk as far as he wants without difficulty.  Weight has gone down by at least 10 pounds and is stayed steady in the low 140s.  He is taking torsemide 20 mg daily.    HISTORY:  Past Medical History:    CAD (coronary artery disease)    Stents x 2 - once in 2006 after MI and prior to CABG and again in 2010.    CAD (coronary artery disease), autologous vein bypass graft    Diabetes (HCC)    DM type 2, uncontrolled, with renal complications    Essential hypertension    Hyperlipidemia    Hyperlipidemia LDL goal < 70    Hypertension    Hypertension, renal disease    Hypertensive heart/renal disease with failure    S/P CABG x 5    CABG x 5 in 2006    Systolic CHF (HCC)      Past Surgical  History:   Procedure Laterality Date    Angioplasty (coronary)  2006    stent x2    Angioplasty (coronary)  2010    stent x1    Cabg  2006    CABG x 5    Other surgical history  2007    Bilateral lens replacement    Other surgical history  2009    Lasik bilaterally    Tonsillectomy  At age 11      Family History   Problem Relation Age of Onset    Diabetes Mother         Type 2 DM on oral agents    Thyroid Disorder Mother         Thyroid history - not able to elaborate    Other (Other) Mother         See thyroid tab    Heart Disorder Maternal Grandmother         CAD    Diabetes Paternal Grandmother         Type 2 DM    Diabetes Brother         Type 2 DM on oral agents    Diabetes Daughter         Type 2 DM on oral agents    Diabetes Daughter         Type 2 DM on oral agents    Thyroid Disorder Daughter         Thyroid history - unable to elaborate    Other (Other) Daughter         See thyroid tab    Diabetes Brother         Type 2 DM on oral agents    Eye Problems Neg       Social History:   Social History     Socioeconomic History    Marital status:     Number of children: 4   Occupational History    Occupation: Auto repair   Tobacco Use    Smoking status: Never    Smokeless tobacco: Never   Vaping Use    Vaping status: Never Used   Substance and Sexual Activity    Alcohol use: Not Currently     Comment: occ    Drug use: No    Sexual activity: Not Currently   Other Topics Concern    Caffeine Concern Yes     Comment: 2-3 cups of coffee weekly    Exercise No    Seat Belt Yes     Social Drivers of Health     Food Insecurity: No Food Insecurity (10/5/2024)    Food Insecurity     Food Insecurity: Never true   Transportation Needs: No Transportation Needs (10/5/2024)    Transportation Needs     Lack of Transportation: No   Physical Activity: Inactive (7/20/2020)    [facility]    Exercise Vital Sign     Days of Exercise per Week: 0 days     Minutes of Exercise per  Session: 0 min   Housing Stability: Low Risk  (10/5/2024)    Housing Stability     Housing Instability: No        Medications (Active prior to today's visit):  Current Outpatient Medications   Medication Sig Dispense Refill    carvedilol 12.5 MG Oral Tab Take 1 tablet (12.5 mg total) by mouth 2 (two) times daily with meals. 60 tablet 11    hydrALAZINE 25 MG Oral Tab Take 1 tablet (25 mg total) by mouth in the morning and 1 tablet (25 mg total) before bedtime. 60 tablet 11    isosorbide mononitrate ER 30 MG Oral Tablet 24 Hr Take 1 tablet (30 mg total) by mouth daily. 30 tablet 11    torsemide 20 MG Oral Tab Take 1 tablet (20 mg total) by mouth daily. 30 tablet 11    aspirin 325 MG Oral Tab Take 1 tablet (325 mg total) by mouth nightly.      ezetimibe 10 MG Oral Tab Take 1 tablet (10 mg total) by mouth daily. (Patient taking differently: Take 1 tablet (10 mg total) by mouth nightly.) 30 tablet 0    clopidogrel 75 MG Oral Tab Take 1 tablet (75 mg total) by mouth daily. (Patient taking differently: Take 1 tablet (75 mg total) by mouth nightly.) 30 tablet 0    atorvastatin 40 MG Oral Tab Take 1 tablet (40 mg total) by mouth daily. (Patient taking differently: Take 1 tablet (40 mg total) by mouth nightly.) 30 tablet 0    Insulin Lispro, 1 Unit Dial, (HUMALOG KWIKPEN) 100 UNIT/ML Subcutaneous Solution Pen-injector 10- 15 units  in divided doses twice daily 15 mL 0    Continuous Blood Gluc Sensor (FREESTYLE CHARLEE 2 SENSOR) Does not apply Misc 1 each every 14 (fourteen) days. 2 each 11    Alcohol Swabs (BD SWAB SINGLE USE REGULAR) Does not apply Pads          Allergies:  Allergies   Allergen Reactions    Adenosine     Spironolactone        ROS:     Denies fever/chills  Denies wt loss/gain  Denies HA or visual changes  Denies CP or palpitations  Denies SOB/cough/hemoptysis  Denies abd or flank pain  Denies N/V/D  Denies change in urinary habits or gross hematuria  Denies LE edema  Denies skin  rashes/myalgias/arthralgias      PHYSICAL EXAM:   /64 (BP Location: Right arm, Patient Position: Sitting)   Wt 145 lb 4 oz (65.9 kg)   BMI 24.17 kg/m²   Wt Readings from Last 3 Encounters:   10/31/24 145 lb 4 oz (65.9 kg)   10/24/24 139 lb (63 kg)   10/05/24 160 lb (72.6 kg)     General: Alert and oriented in no apparent distress.  HEENT: No scleral icterus, MMM  Neck: Supple, no EBONY or thyromegaly  Cardiac: Regular rate and rhythm, S1, S2 normal, no murmur or rub  Lungs: Clear without wheezes, rales, rhonchi.    Abdomen: Soft, non-tender. + bowel sounds, no palpable organomegaly  Extremities: Without clubbing, cyanosis or edema.  Neurologic: Alert and oriented, normal affect, cranial nerves grossly intact, moving all extremities  Skin: Warm and dry, no rashes      Luiza Sarah MD  10/31/2024  1:36 PM

## 2024-11-19 NOTE — TELEPHONE ENCOUNTER
Requested Prescriptions     Pending Prescriptions Disp Refills    Insulin Lispro, 1 Unit Dial, 100 UNIT/ML Subcutaneous Solution Pen-injector [Pharmacy Med Name: Insulin Lispro (1 Unit Dial) 100 UNIT/ML Subcutaneous Solution Pen-injector] 15 mL 0     Sig: INJECT 10 TO 15 UNITS SUBCUTANEOUSLY IN DIVIDED DOSES TWICE DAILY     Future Appointments   Date Time Provider Department Center   3/4/2025 10:00 AM Luiza Sarah MD [facility]     Last A1c value was 7.4% done 10/5/2024.  Refill 1/17/2024 Dr Ravi PCP   LOV 7/31/23 Miguel     Certified letter for pt appt within 3 months sent out 12/26/2023     [facility] appt   11/14/2023 04/09/2024

## 2024-11-20 RX ORDER — INSULIN LISPRO 100 [IU]/ML
INJECTION, SOLUTION INTRAVENOUS; SUBCUTANEOUS
Qty: 3 ML | Refills: 0 | Status: SHIPPED | OUTPATIENT
Start: 2024-11-20

## 2024-11-20 NOTE — TELEPHONE ENCOUNTER
One month supply sent. He is overdue for follow-up. Has not been seen since his hospitalization. Has he switched his PCP? Please CALL him

## 2024-11-26 ENCOUNTER — TELEPHONE (OUTPATIENT)
Dept: INTERNAL MEDICINE CLINIC | Facility: CLINIC | Age: 64
End: 2024-11-26

## 2024-11-29 NOTE — TELEPHONE ENCOUNTER
Please review; protocol failed/No Protocol/passes    Patient is due for follow up appointment per refill request on 09/30/2024.-no appointment made.     Sent [employer] message to patient to schedule  Will route to front office to schedule.     Requested Prescriptions   Pending Prescriptions Disp Refills    ATORVASTATIN 40 MG Oral Tab [Pharmacy Med Name: Atorvastatin Calcium 40 MG Oral Tablet] 30 tablet 0     Sig: Take 1 tablet by mouth once daily       Cholesterol Medication Protocol Passed - 11/29/2024 12:49 PM        Passed - ALT < 80     Lab Results   Component Value Date    ALT 27 10/28/2024             Passed - ALT resulted within past year        Passed - Lipid panel within past 12 months     Lab Results   Component Value Date    CHOLEST 112 10/28/2024    TRIG 86 10/28/2024    HDL 42 10/28/2024    LDL 53 10/28/2024    VLDL 12 10/28/2024    TCHDLRATIO 3.1 02/13/2023    NONHDLC 70 10/28/2024             Passed - In person appointment or virtual visit in the past 12 mos or appointment in next 3 mos     Recent Outpatient Visits              4 weeks ago CKD (chronic kidney disease) stage 4, GFR 15-29 ml/min ([facility])    [facility] Nephrology Luiza Sarah MD    Office Visit    5 months ago Stage 3 chronic kidney disease, unspecified whether stage 3a or 3b CKD ([facility])    [facility] NephLuiza Corrigan MD    Office Visit    8 months ago Preop examination    [address] Say Nayak MD    Office Visit    10 months ago Type 2 diabetes mellitus with hyperglycemia, with long-term current use of insulin ([facility])    [address] Say Nayak MD    Office Visit    1 year ago Type 2 diabetes mellitus with hyperglycemia, with long-term current use of insulin ([facility])    [address] Libertad Guillen APRN    Office Visit          Future Appointments         Provider Department  Appt Notes    In 3 months Luiza Sarah MD [facility] Nephrology 4[facility] F/U                      EZETIMIBE 10 MG Oral Tab [Pharmacy Med Name: Ezetimibe 10 MG Oral Tablet] 30 tablet 0     Sig: Take 1 tablet by mouth once daily       Cholesterol Medication Protocol Passed - 11/29/2024 12:49 PM        Passed - ALT < 80     Lab Results   Component Value Date    ALT 27 10/28/2024             Passed - ALT resulted within past year        Passed - Lipid panel within past 12 months     Lab Results   Component Value Date    CHOLEST 112 10/28/2024    TRIG 86 10/28/2024    HDL 42 10/28/2024    LDL 53 10/28/2024    VLDL 12 10/28/2024    TCHDLRATIO 3.1 02/13/2023    NONHDLC 70 10/28/2024             Passed - In person appointment or virtual visit in the past 12 mos or appointment in next 3 mos     Recent Outpatient Visits              4 weeks ago CKD (chronic kidney disease) stage 4, GFR 15-29 ml/min ([facility])    [facility] Nephrology Luiza Sarah MD    Office Visit    5 months ago Stage 3 chronic kidney disease, unspecified whether stage 3a or 3b CKD ([facility])    [facility]phrology Luiza Sarah MD    Office Visit    8 months ago [address] examination    [address]    Office Visit    10 months ago Type 2 diabetes mellitus with hyperglycemia, with long-term current use of insulin ([facility])    [address] Say Nayak MD    Office Visit    1 year ago Type 2 diabetes mellitus with hyperglycemia, with long-term current use of insulin ([facility])    [address] APR[address]    Office Visit          Future Appointments         Provider Department Appt Notes    In 3 months Luiza Sarah MD [facility] Nephrology 4[facility] F/U                      CLOPIDOGREL 75 MG Oral Tab [Pharmacy Med Name: Clopidogrel Bisulfate 75 MG Oral Tablet] 30 tablet 0     Sig:  Take 1 tablet by mouth once daily       There is no refill protocol information for this order        Future Appointments         Provider Department Appt Notes    In 3 months Luiza Sarah MD [facility] Nephrology [facility] F/U          Recent Outpatient Visits              4 weeks ago CKD (chronic kidney disease) stage 4, GFR 15-29 ml/min ([facility])    [facility] Nephrology Luiza Sarah MD    Office Visit    5 months ago Stage 3 chronic kidney disease, unspecified whether stage 3a or 3b CKD ([facility])    [facility] Nephrology Luiza Sarah MD    Office Visit    8 months ago Preop examination    [facility], [address] [place], [place] [address]Alma Rosa Michael, MD    Office Visit    10 months ago Type 2 diabetes mellitus with hyperglycemia, with long-term current use of insulin ([facility])    [address] MD    Office Visit    1 year ago Type 2 diabetes mellitus with hyperglycemia, with long-term current use of insulin ([facility])    [address] Cheryl Ann, APRN    Office Visit

## 2024-11-30 RX ORDER — ATORVASTATIN CALCIUM 40 MG/1
40 TABLET, FILM COATED ORAL DAILY
Qty: 30 TABLET | Refills: 0 | Status: SHIPPED | OUTPATIENT
Start: 2024-11-30

## 2024-11-30 RX ORDER — EZETIMIBE 10 MG/1
10 TABLET ORAL DAILY
Qty: 30 TABLET | Refills: 0 | Status: SHIPPED | OUTPATIENT
Start: 2024-11-30

## 2024-11-30 RX ORDER — CLOPIDOGREL BISULFATE 75 MG/1
75 TABLET ORAL DAILY
Qty: 30 TABLET | Refills: 0 | Status: SHIPPED | OUTPATIENT
Start: 2024-11-30

## 2024-12-10 LAB
HDL CHOLESTEROL: 38 MG/DL (ref 60–60)
LDL CHOLESTEROL: 61 MG/DL (ref ?–130)
NON-HDL CHOLESTEROL: 73
TOTAL CHOLESTEROL: 111 MG/DL (ref ?–200)
TRIGLYCERIDES: 75 MG/DL

## 2024-12-26 NOTE — TELEPHONE ENCOUNTER
Spoke with patient he stated he has a new Primary Care Physician. He appreciated the call. He will call his Primary Care Physician for future refills and to be removed from the auto refills through his pharmacy.

## 2025-01-03 LAB
ANION GAP: 9
CALCIUM: 9.3
CARBON DIOXIDE: 24
CHLORIDE: 106
CREATININE: 3.6 MG/DL
EGFR: 18
GLUCOSE: 121
POTASSIUM: 4.7
SODIUM: 139

## 2025-01-03 RX ORDER — CLOPIDOGREL BISULFATE 75 MG/1
75 TABLET ORAL DAILY
Qty: 30 TABLET | Refills: 0 | Status: SHIPPED | OUTPATIENT
Start: 2025-01-03

## 2025-01-03 RX ORDER — ATORVASTATIN CALCIUM 40 MG/1
40 TABLET, FILM COATED ORAL DAILY
Qty: 30 TABLET | Refills: 0 | Status: SHIPPED | OUTPATIENT
Start: 2025-01-03

## 2025-01-03 RX ORDER — EZETIMIBE 10 MG/1
10 TABLET ORAL DAILY
Qty: 30 TABLET | Refills: 0 | Status: SHIPPED | OUTPATIENT
Start: 2025-01-03

## 2025-01-03 NOTE — TELEPHONE ENCOUNTER
Please review. Protocol Pass    Future Appointments   Date Time Provider Department Center   3/4/2025 10:00 AM Luiza Sarah MD [facility] MyChart message sent to patient to schedule an office visit with Provider and/or  Routed to Patient  for assistance with appointment.     Requested Prescriptions   Pending Prescriptions Disp Refills    EZETIMIBE 10 MG Oral Tab [Pharmacy Med Name: Ezetimibe 10 MG Oral Tablet] 30 tablet 0     Sig: Take 1 tablet by mouth once daily       Cholesterol Medication Protocol Passed - 1/3/2025 10:07 AM        Passed - ALT < 80     Lab Results   Component Value Date    ALT 27 10/28/2024             Passed - ALT resulted within past year        Passed - Lipid panel within past 12 months     Lab Results   Component Value Date    CHOLEST 112 10/28/2024    TRIG 86 10/28/2024    HDL 42 10/28/2024    LDL 53 10/28/2024    VLDL 12 10/28/2024    TCHDLRATIO 3.1 02/13/2023    NONHDLC 70 10/28/2024             Passed - In person appointment or virtual visit in the past 12 mos or appointment in next 3 mos     Recent Outpatient Visits              2 months ago CKD (chronic kidney disease) stage 4, GFR 15-29 ml/min ([facility])    [facility] Nephrology Luiza Sarah MD    Office Visit    6 months ago Stage 3 chronic kidney disease, unspecified whether stage 3a or 3b CKD ([facility])    [facility] Nephrology Luiza Sarah MD    Office Visit    9 months ago Preop examination    [address] Say Nayak MD    Office Visit    11 months ago Type 2 diabetes mellitus with hyperglycemia, with long-term current use of insulin ([facility])    [address] Say Nayak MD    Office Visit    1 year ago Type 2 diabetes mellitus with hyperglycemia, with long-term current use of insulin ([facility])    [address] Cher[address], APRN    Office  Visit          Future Appointments         Provider Department Appt Notes    In 2 months Luiza Sarah MD [facility] Nephrology [facility] F/U                      CLOPIDOGREL 75 MG Oral Tab [Pharmacy Med Name: Clopidogrel Bisulfate 75 MG Oral Tablet] 30 tablet 0     Sig: Take 1 tablet by mouth once daily       There is no refill protocol information for this order       ATORVASTATIN 40 MG Oral Tab [Pharmacy Med Name: Atorvastatin Calcium 40 MG Oral Tablet] 30 tablet 0     Sig: Take 1 tablet by mouth once daily       Cholesterol Medication Protocol Passed - 1/3/2025 10:07 AM        Passed - ALT < 80     Lab Results   Component Value Date    ALT 27 10/28/2024             Passed - ALT resulted within past year        Passed - Lipid panel within past 12 months     Lab Results   Component Value Date    CHOLEST 112 10/28/2024    TRIG 86 10/28/2024    HDL 42 10/28/2024    LDL 53 10/28/2024    VLDL 12 10/28/2024    TCHDLRATIO 3.1 02/13/2023    NONHDLC 70 10/28/2024             Passed - In person appointment or virtual visit in the past 12 mos or appointment in next 3 mos     Recent Outpatient Visits              2 months ago CKD (chronic kidney disease) stage 4, GFR 15-29 ml/min ([facility])    [facility] Nephrology Luiza Sarah MD    Office Visit    6 months ago Stage 3 chronic kidney disease, unspecified whether stage 3a or 3b CKD ([facility])    [facility] Nephrology Luiza Sarah MD    Office Visit    9 months ago Preop examination    [address] Say Nayak MD    Office Visit    11 months ago Type 2 diabetes mellitus with hyperglycemia, with long-term current use of insulin ([facility])    [address] Say Nayak MD    Office Visit    1 year ago Type 2 diabetes mellitus with hyperglycemia, with long-term current use of insulin ([facility])    [address] Cheryl Ann, RUSS     Office Visit          Future Appointments         Provider Department Appt Notes    In 2 months Luiza Sarah MD [facility] Nephrology 4[facility] F/U                           Future Appointments         Provider Department Appt Notes    In 2 months Luiza Sarah MD [facility] Nephrology 4[facility] F/U          Recent Outpatient Visits              2 months ago CKD (chronic kidney disease) stage 4, GFR 15-29 ml/min ([facility])    [facility] Nephrology Luiza Sarah MD    Office Visit    6 months ago Stage 3 chronic kidney disease, unspecified whether stage 3a or 3b CKD ([facility])    [facility] Nephrology Luiza Sarah MD    Office Visit    9 months ago Preop examination    [address] MD    Office Visit    11 months ago Type 2 diabetes mellitus with hyperglycemia, with long-term current use of insulin ([facility])    [address] Say Nayak MD    Office Visit    1 year ago Type 2 diabetes mellitus with hyperglycemia, with long-term current use of insulin ([facility])    [address] Cher[address] Ann, APRN    Office Visit

## 2025-01-06 ENCOUNTER — TELEPHONE (OUTPATIENT)
Dept: INTERNAL MEDICINE CLINIC | Facility: CLINIC | Age: 65
End: 2025-01-06

## 2025-03-11 LAB
ANION GAP: 11
BUN: 63
CALCIUM: 9
CARBON DIOXIDE: 23
CHLORIDE: 103
CREATININE: 4.1 MG/DL
EGFR: 16
GLUCOSE: 123
POTASSIUM: 4.7
SODIUM: 137

## 2025-03-19 ENCOUNTER — OFFICE VISIT (OUTPATIENT)
Dept: ENDOCRINOLOGY CLINIC | Facility: CLINIC | Age: 65
End: 2025-03-19
Payer: MEDICAID

## 2025-03-19 ENCOUNTER — TELEPHONE (OUTPATIENT)
Dept: ENDOCRINOLOGY CLINIC | Facility: CLINIC | Age: 65
End: 2025-03-19

## 2025-03-19 VITALS
OXYGEN SATURATION: 97 % | WEIGHT: 146.81 LBS | BODY MASS INDEX: 24 KG/M2 | SYSTOLIC BLOOD PRESSURE: 116 MMHG | DIASTOLIC BLOOD PRESSURE: 64 MMHG | RESPIRATION RATE: 16 BRPM | HEART RATE: 80 BPM

## 2025-03-19 DIAGNOSIS — Z79.4 TYPE 2 DIABETES MELLITUS WITH HYPERGLYCEMIA, WITH LONG-TERM CURRENT USE OF INSULIN (HCC): Primary | ICD-10-CM

## 2025-03-19 DIAGNOSIS — I10 PRIMARY HYPERTENSION: ICD-10-CM

## 2025-03-19 DIAGNOSIS — E78.5 HYPERLIPIDEMIA WITH TARGET LOW DENSITY LIPOPROTEIN (LDL) CHOLESTEROL LESS THAN 70 MG/DL: ICD-10-CM

## 2025-03-19 DIAGNOSIS — N18.4 CKD STAGE 4 DUE TO TYPE 2 DIABETES MELLITUS (HCC): ICD-10-CM

## 2025-03-19 DIAGNOSIS — E11.65 TYPE 2 DIABETES MELLITUS WITH HYPERGLYCEMIA, WITH LONG-TERM CURRENT USE OF INSULIN (HCC): Primary | ICD-10-CM

## 2025-03-19 DIAGNOSIS — I25.810 CORONARY ARTERY DISEASE INVOLVING AUTOLOGOUS VEIN CORONARY BYPASS GRAFT WITHOUT ANGINA PECTORIS: ICD-10-CM

## 2025-03-19 DIAGNOSIS — E11.22 CKD STAGE 4 DUE TO TYPE 2 DIABETES MELLITUS (HCC): ICD-10-CM

## 2025-03-19 LAB — HEMOGLOBIN A1C: 7.1 % (ref 4.3–5.6)

## 2025-03-19 PROCEDURE — 99215 OFFICE O/P EST HI 40 MIN: CPT | Performed by: NURSE PRACTITIONER

## 2025-03-19 PROCEDURE — 99417 PROLNG OP E/M EACH 15 MIN: CPT | Performed by: NURSE PRACTITIONER

## 2025-03-19 PROCEDURE — 95251 CONT GLUC MNTR ANALYSIS I&R: CPT | Performed by: NURSE PRACTITIONER

## 2025-03-19 PROCEDURE — 83036 HEMOGLOBIN GLYCOSYLATED A1C: CPT | Performed by: NURSE PRACTITIONER

## 2025-03-19 RX ORDER — DAPAGLIFLOZIN 10 MG/1
10 TABLET, FILM COATED ORAL DAILY
Qty: 30 TABLET | Refills: 0 | Status: SHIPPED | OUTPATIENT
Start: 2025-03-19 | End: 2025-04-18

## 2025-03-19 RX ORDER — PEN NEEDLE, DIABETIC 30 GX3/16"
1 NEEDLE, DISPOSABLE MISCELLANEOUS 3 TIMES DAILY
Qty: 300 EACH | Refills: 0 | Status: SHIPPED | OUTPATIENT
Start: 2025-03-19

## 2025-03-19 RX ORDER — GLUCAGON INJECTION, SOLUTION 1 MG/.2ML
INJECTION, SOLUTION SUBCUTANEOUS
Qty: 0.4 ML | Refills: 1 | Status: SHIPPED | OUTPATIENT
Start: 2025-03-19

## 2025-03-19 RX ORDER — HYDRALAZINE HYDROCHLORIDE 50 MG/1
50 TABLET, FILM COATED ORAL 3 TIMES DAILY
COMMUNITY
Start: 2025-02-18

## 2025-03-19 NOTE — PATIENT INSTRUCTIONS
A1C 7.1%       With your heart and kidneys, please restart the Farxiga 10mg once daily     This medication will remove extra sugar out of blood into your urine.   This medication will not only help improve your blood sugars but have also shown to help protect the kidneys and heart.       It can be dosed anytime of day but morning is probably best time to take it due to increase in urination effect.     Please drink at least 4 glasses of water daily to avoid dehydration       Please call me if you develop any symptoms of urinary tract infection (burning with urination) or yeast infection (new rash itching or burning in the genital area)      If you are ever sick and not keeping fluids down, please hold the  Farxiga until you are tolerating fluids again     Also when taking these medications avoid following a  very-low-carbohydrate or ketogenic diet  as this  could increase the production of ketones, particularly during times of illness, dehydration, and/or metabolic stress, leading to diabetes ketoacidosis       This medication may NOT help the diabetes but it is good for your heart   I will also let Dr. Sarah know we started this medication     Continue Dexcom   Humalog : 4- 6 units at meals  Best to dose right at the time you are eating the meal  Each humalog dose lasts up to 3 hours  Please do NOT dose again for 3 hours to avoid low blood sugars     If your Blood sugar is above 220 , more than 3 hours after meal dose of Humalog, ok to dose again BUT at lower doses     220-260, dose 2 units   261-300, dose 3 units   Over 301, dose 4 units     Use a new pen needle with each injection -         American Diabetes Association: blood sugar targets:     Fasting blood sugar (before breakfast) Target:      2 hours after eating less than 180 (ideally less than  150 )     Call for blood sugars less than  85 or on dexcom staying above 250 mg/dl after meals twice in a week       If you are wearing the sensor, please look  at your trends/averages    Recommendations:   GMI (estimated A1C ) target under  7%     Time in range (healthy blood sugar targets) : goal is over 70%   less than 70 : goal is less than 4%   Over 180 : goal is less than 20 %   Over 250: goal is  less than 5%     Watch for low blood sugars: (less than 70 )    Treatment of Low Blood Glucose Action Plan  1. Check blood glucose to be sure that it is low. You cannot  always go by symptoms or how you feel. If in doubt, treat your low blood glucose anyway.  Rule of 15 :     2. Take 15 grams of carbohydrate (carb). Here are some choices:    4 oz. regular fruit juice  3-4 glucose tablets  6 oz. regular soda   7-8 jelly beans    3. Recheck blood glucose after 10-15 minutes. If blood glucose is still low (less than 70 mg/dl) repeat the treatment (step 2).    4. If your next meal is more than one hour away, eat a small snack.    5. If you’re not sure what caused your low blood glucose, call your healthcare provider.    6. Always check your blood glucose before you drive       To treat a low, I recommend you carry with you easy, pre-portioned treatment for low blood sugars that are 15G of carbs:   - Children sized squeeze pouch applesauce (high fiber + carbs help prevent too high of a spike)  - Small children's sized juicebox- 15g carb --> 4oz juice box  - Glucose tablets from [employer], you can find them near diabetes supplies --> Note, you will need to eat 3-4 tablets to get to 15g of carbs  - Children sized fruit snack pack- look for one with 15 grams of total carbohydrate    AVOID complex carbs, or foods that contain fats along with carbs (like chocolate) can slow the absorption of glucose and should NOT be used to treat an emergency low      I have ordered you Gvoke. This is to be used in emergency situations only. Below are instructions from the [employer] website on how to use, when to use

## 2025-03-19 NOTE — PROGRESS NOTES
-----------------------------  Dexcom Clarity  -----------------------------  Roldan Mast    YOB: 1960    Generated at: Wed, Mar 19, 2025 1:58 PM CDT    Reporting period: Tue Feb 18, 2025 - Wed Mar 19, 2025  -----------------------------  Glucose Details    Average glucose: 186 mg/dL    GMI: 7.8%    Standard deviation: 71 mg/dL    Coefficient of Variation: 37.9%  -----------------------------  Time in Range    Very High: 19%    High: 25%    In Range: 56%    Low: 0%    Very Low: <1%    Target Range   mg/dL    -----------------------------  Sensor usage    Days with data: 30/30    Time active: 97%    Avg. calibrations per day: 0.0

## 2025-03-19 NOTE — TELEPHONE ENCOUNTER
BASIC METABOLIC PANEL  Order: [id number]  Component  Ref Range & Units 3/11/25  8:15 AM   SODIUM  136 - 144 mmol/L 137   POTASSIUM  3.3 - 5.1 mmol/L 4.7   CHLORIDE  98 - 108 mmol/L 103   CO2  20 - 32 mmol/L 23   ANION GAP  4 - 16 11   BUN  7 - 22 MG/DL 63 High    CREATININE  0.6 - 1.4 MG/DL 4.10 High    GLUCOSE  70 - 100 MG/ High    Comment: IMPAIRED FASTING RANGE: 101-125 MG/DL   CALCIUM  8.9 - 10.3 MG/DL 9.0   ESTIMATED GFR  >59 ML/MIN/1.73M2 16 Low

## 2025-03-19 NOTE — PROGRESS NOTES
Roldan Mast is a 64 year old presents today for Diabetes management.   Primary care physician: Maylin YBARRA   Last Diabetes appointment with me 7-2023   Lost to follow up- per patient former PCP said he was managing Diabetes .   Established w new PCP (independent ) Maylin YBARRA     Dexcom was started by PCP RUSS for past year   Health updates:   CKD progression; on [facility] transplant  -->  followed by Dr Sarah and Ana . CKD stage 4; last egfr 16 3-     R eye diabetic retinopathy : currently having avastin injections       Chronic combined systolic and diastolic congestive heart failure (Primary Dx);   Ischemic cardiomyopathy;     Today's A1C 7.1 % ( last A1C 7.4%)     In past cost issues w irx but now on [place]   Wearing Dexcom  reviewed reports today   Hypoglycemia after meals but patient admits will dose humalog 4- 6 units before meal and if Blood sugar over 250-300 mg/dl 1-2 hrs later, will dose 4- 6 units again       -----------------------------  Dexcom Clarity  -----------------------------  Roldan Mast    YOB: 1960    Generated at: Wed, Mar 19, 2025 3:09 PM CDT    Reporting period: Thu Mar 6, 2025 - Wed Mar 19, 2025  -----------------------------  Glucose Details    Average glucose: 167 mg/dL    GMI: 7.3%    Standard deviation: 63 mg/dL    Coefficient of Variation: 37.5%  -----------------------------  Time in Range    Very High: 12%    High: 22%    In Range: 66%    Low: 0%    Very Low: 0%    Target Range   mg/dL    -----------------------------  Sensor usage    Days with data: 14/14    Time active: 95%    Avg. calibrations per day: 0.0           Diabetes History:  DM ~ 1995   transitioned to insulin ~ 1997   Patient has not had hospitalizations for blood sugar issues  denies any history of pancreatitis      Previous DM therapies:  Lack of effect w oral agents   Levemir: formulary   Farxiga 10 mg : 2023 : cost   Basaglar: 2024 hypoglycemia        Current DM Regimen:  Basaglar 28 units twice daily (stopped )   Humalog U 100  kwik pen: 4-6 units at  meals  twice daily w B/Dmeal               HGBA1C:    Lab Results   Component Value Date    A1C 7.4 (H) 10/05/2024    A1C 8.1 (H) 12/19/2023    A1C 8.7 (A) 07/31/2023     (H) 10/05/2024       Lab Results   Component Value Date    CHOLEST 112 10/28/2024    CHOLEST 216 (H) 02/13/2023    TRIG 86 10/28/2024    TRIG 128 02/13/2023    HDL 42 10/28/2024    HDL 69 02/13/2023    LDL 53 10/28/2024     (H) 02/13/2023     Lab Results   Component Value Date    MICROALBCREA 1,529.9 (H) 10/28/2024    MICROALBCREA 1,002.7 (H) 08/31/2016      Lab Results   Component Value Date    CREATSERUM 3.75 (H) 10/28/2024    CREATSERUM 3.73 (H) 10/09/2024    EGFRCR 17 (L) 10/28/2024    EGFRCR 18 (L) 10/09/2024     Lab Results   Component Value Date    AST 20 10/28/2024    AST 14 10/08/2024    ALT 27 10/28/2024    ALT 19 10/08/2024       Lab Results   Component Value Date    TSH 2.228 10/28/2024    TSH 2.17 09/25/2021    T4F 1.0 09/25/2021                DM Complications:  Microvascular:   Neuropathy: yes  Retinopathy: yes  Nephropathy: yes    Macrovascular:  PVD: no  CAD: yes hx Stents x 2 2006,  CABG x 5 2010 , CHF   Stroke/CVA: no    Modifying factors:  Medication adherence: yes   Recent steroids, illness or infections ( past 3m): no     Allergies: Adenosine and Spironolactone    Past Medical History:    CAD (coronary artery disease)    Stents x 2 - once in 2006 after MI and prior to CABG and again in 2010.    CAD (coronary artery disease), autologous vein bypass graft    Diabetes (HCC)    DM type 2, uncontrolled, with renal complications    Essential hypertension    Hyperlipidemia    Hyperlipidemia LDL goal < 70    Hypertension    Hypertension, renal disease    Hypertensive heart/renal disease with failure    S/P CABG x 5    CABG x 5 in 2006    Systolic CHF (HCC)     Past Surgical History:   Procedure Laterality  Date    Angioplasty (coronary)  2006    stent x2    Angioplasty (coronary)  2010    stent x1    Cabg  2006    CABG x 5    Other surgical history  2007    Bilateral lens replacement    Other surgical history  2009    Lasik bilaterally    Tonsillectomy  At age 11     Social History     Socioeconomic History    Marital status:     Number of children: 4   Occupational History    Occupation: Auto repair   Tobacco Use    Smoking status: Never    Smokeless tobacco: Never   Vaping Use    Vaping status: Never Used   Substance and Sexual Activity    Alcohol use: Not Currently     Comment: occ    Drug use: No    Sexual activity: Not Currently   Other Topics Concern    Caffeine Concern Yes     Comment: 2-3 cups of coffee weekly    Exercise No    Seat Belt Yes     Social Drivers of Health     Food Insecurity: No Food Insecurity (2/2/2025)    Received from [facility]    Hunger Vital Sign     Worried About Running Out of Food in the Last Year: Never true     Ran Out of Food in the Last Year: Never true   Transportation Needs: No Transportation Needs (2/2/2025)    Received from [facility]    PRAPARE - Transportation     Lack of Transportation (Medical): No     Lack of Transportation (Non-Medical): No   Housing Stability: Unknown (2/2/2025)    Received from [facility]    Housing Stability Vital Sign     Unable to Pay for Housing in the Last Year: No     Family History   Problem Relation Age of Onset    Diabetes Mother         Type 2 DM on oral agents    Thyroid Disorder Mother         Thyroid history - not able to elaborate    Other (Other) Mother         See thyroid tab    Heart Disorder Maternal Grandmother         CAD    Diabetes Paternal Grandmother         Type 2 DM    Diabetes Brother         Type 2 DM on oral agents    Diabetes Daughter         Type 2 DM on oral agents    Diabetes Daughter         Type 2 DM on oral agents    Thyroid Disorder Daughter          Thyroid history - unable to elaborate    Other (Other) Daughter         See thyroid tab    Diabetes Brother         Type 2 DM on oral agents    Eye Problems Neg      Current Medication List:   Current Outpatient Medications   Medication Sig Dispense Refill    FARXIGA 10 MG Oral Tab Take 1 tablet (10 mg total) by mouth daily. 30 tablet 0    glucagon (GVOKE HYPOPEN 2-PACK) 1 MG/0.2ML Subcutaneous injection 1mg subcutaneous as needed for severe hypoglycemia 0.4 mL 1    Insulin Pen Needle (PEN NEEDLES) 32G X 4 MM Does not apply Misc 1 Device in the morning, at noon, and at bedtime. 300 each 0    ezetimibe 10 MG Oral Tab Take 1 tablet (10 mg total) by mouth daily. 30 tablet 0    clopidogrel 75 MG Oral Tab Take 1 tablet (75 mg total) by mouth daily. 30 tablet 0    atorvastatin 40 MG Oral Tab Take 1 tablet (40 mg total) by mouth daily. 30 tablet 0    Insulin Lispro, 1 Unit Dial, 100 UNIT/ML Subcutaneous Solution Pen-injector INJECT 10 TO 15 UNITS SUBCUTANEOUSLY IN DIVIDED DOSES TWICE DAILY 3 mL 0    carvedilol 12.5 MG Oral Tab Take 1 tablet (12.5 mg total) by mouth 2 (two) times daily with meals. 60 tablet 11    hydrALAZINE 25 MG Oral Tab Take 1 tablet (25 mg total) by mouth in the morning and 1 tablet (25 mg total) before bedtime. 60 tablet 11    isosorbide mononitrate ER 30 MG Oral Tablet 24 Hr Take 1 tablet (30 mg total) by mouth daily. 30 tablet 11    torsemide 20 MG Oral Tab Take 1 tablet (20 mg total) by mouth daily. 30 tablet 11    aspirin 325 MG Oral Tab Take 1 tablet (325 mg total) by mouth nightly.      Alcohol Swabs (BD SWAB SINGLE USE REGULAR) Does not apply Pads              DM associated review of  symptoms:   Endocrine: Polyuria, polyphagia, polydipsia: no  Neurological: Paresthesias: yes, LE   HEENT: Blurred vision: no  Skin: no rash or wounds  Hematological: Hypoglycemia: yes, - see dexcom        Review of Systems   + fatigue   LUNGS: denies shortness of breath   CARDIOVASCULAR: denies chest pain  GI:  denies abdominal pain, nausea or diarrhea   : denies dysuria          Physical exam:  /64   Pulse 80   Resp 16   Wt 146 lb 12.8 oz (66.6 kg)   SpO2 97%   BMI 24.43 kg/m²   Body mass index is 24.43 kg/m².    Physical Exam   Vitals reviewed.  Constitutional: Normal appearance   Cardiovascular: Normal rate , rhythm   Pulmonary/Chest: Effort normal, clear lung sounds   Neurological: Alert and oriented .   Psychiatric: Normal mood and affect.       Assessment/Plan:    Hypertension  Well controlled and  needs ongoing monitoring   CPM        Nephropathy /CKD stage 4   Reminded patient impact glycemic trends have on kidney health   Lab Results   Component Value Date    EGFRCR 17 (L) 10/28/2024    MICROALBCREA 1,529.9 (H) 10/28/2024      Needs ongoing monitoring --> maintain w Dr Sarah/Ana Transplant   Notify nephrology about Farxiga start for CHF/CAD.       CAD/CHF   Restart Farxiga 10mg once daily   Discussed the risk of and benefits of SGLT2 inhibitor class for treatment of ASCVD/CHF.     Denies  recent or recurrent UTI or yeast infections- agreeable to therapy        Discussed side effects including UTI and fungal infections. To call if any rash, itching in genital area or painful urination develops  Discussed the importance of hydration. Reviewed Sick day management and avoidance of keto diets     To notify cardiology of Farxiga start in case diurectics need to be adjusted.              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     Due to lower body weight, appetite: avoid GLP1 at this time   For CAD, CHF: restarting Farxiga 10mg once daily however due to CKD stage 4, will have minimal impact on Glycemic trends     Discussed Humalog U 100 and active insulin time: 3 hours   Patient to avoid dosing again after meals for at least 3 hours due to risk of hypoglycemia -   Patient provided correction scale between meals if needed when Blood sugar over 220 (detailed on AVS)     Continue Dexcom G7   Humalog U 100 kwik pen  : 4- 6 units at meals  After 3 hours of meal dose, If  Blood sugar is above 220 , ok to dose Humalog again BUT at lower doses   Correction dose:   If Blood sugar is:   220-260, dose 2 units   261-300, dose 3 units   Over 301, dose 4 units     Continue site rotation for subcutaneous injections     Reviewed clinical significance of A1c, adverse effects of suboptimal glucose control, and goals of therapy   Reviewed the A1C test, what the value reflects and the goal for the patient.   Reminded pt on A1C and blood sugar targets (Fasting < 130 and post prandial <180 ) and complications associated with hyperglycemia and uncontrolled DM (on AVS)   Recommended SMBG 3- x daily if not using CGM   Reviewed s/s and treatment of hypoglycemia (on AVS)   For hypoglycemia emergency, Glucagon rx: 3-2025       Reinforced timing and adherence with medication, self-monitoring of blood glucose and routine follow up    Dexcom check in 4 weeks   The patient is asked to return in -3 m w APN  but recommended to contact DM clinic sooner if questions or concerns.    The patient indicates understanding of these issues and agrees to the plan.      Orders Placed This Encounter    Microalb/Creat Ratio, Random Urine     Order Specific Question:   Release to patient     Answer:   Immediate    POC Hemoglobin A1C     Order Specific Question:   Release to patient     Answer:   Immediate    FARXIGA 10 MG Oral Tab     Sig: Take 1 tablet (10 mg total) by  mouth daily.     Dispense:  30 tablet     Refill:  0     BIN:[id number] PCN:CN GR: [id number] ID:[id number]    glucagon (GVOKE HYPOPEN 2-PACK) 1 MG/0.2ML Subcutaneous injection     Simg subcutaneous as needed for severe hypoglycemia     Dispense:  0.4 mL     Refill:  1    Insulin Pen Needle (PEN NEEDLES) 32G X 4 MM Does not apply Misc     Si Device in the morning, at noon, and at bedtime.     Dispense:  300 each     Refill:  0     Diabetes complications & risks surveillance:   A1C/Blood pressure: as reported    Last dilated eye exam: Last Dilated Eye Exam: 25   Exam shows retinopathy? Eye Exam shows Diabetic Retinopathy?: Yes  Last diabetic foot exam: No data recorded  Nephropathy screening:  Not on  ace /arb rx.  (Per nephrology)  Lab Results   Component Value Date    EGFRCR 17 (L) 10/28/2024    MICROALBCREA 1,529.9 (H) 10/28/2024     LIPID screening:    Lab Results   Component Value Date    CHOLEST 112 10/28/2024    LDL 53 10/28/2024    TRIG 86 10/28/2024    HDL 42 10/28/2024    FASTING Yes 10/28/2024    FASTING Yes 10/28/2024     Cholesterol Lowering Medications            ezetimibe 10 MG Oral Tab    atorvastatin 40 MG Oral Tab             Code selection for this visit was based on time spent (55 min ) on date of service in preparing to see the patient, obtaining and/or reviewing separately obtained history,reviewing outside health records (nephrology, opth and cardiology [facility] everywhere) evaluating patient, reviewing blood glucose trends/patterns, discussing treatment options performing a medically appropriate examination, counseling and educating the patient/family/caregiver, ordering medications or testing, referring and communicating with other healthcare providers, documenting clinical information in the EHR, independently interpreting results and communicating results to the patient/family/caregiver and care coordination with the patient's other providers.  Note: This time does NOT  include CGM interpretation time   The risks and benefits of my recommendations, as well as other treatment options were discussed with the patient today. questions were also answered to the best of my knowledge.

## 2025-03-19 NOTE — TELEPHONE ENCOUNTER
BASIC METABOLIC PANEL  Order: [id number]  Component  Ref Range & Units 1/3/25 10:28 AM   SODIUM  136 - 144 mmol/L 139   POTASSIUM  3.3 - 5.1 mmol/L 4.7   CHLORIDE  98 - 108 mmol/L 106   CO2  20 - 32 mmol/L 24   ANION GAP  4 - 16 9   BUN  7 - 22 MG/DL 61 High    CREATININE  0.6 - 1.4 MG/DL 3.60 High    GLUCOSE  70 - 100 MG/ High    Comment: IMPAIRED FASTING RANGE: 101-125 MG/DL   CALCIUM  8.9 - 10.3 MG/DL 9.3   ESTIMATED GFR  >59 ML/MIN/1.73M2 18 Low        LIPID PROFILE  Order: [id number]  Component  Ref Range & Units 12/10/24 12:50 PM   CHOLESTEROL  <200 MG/   Comment: BASED ON CURRENT GUIDELINES, THIS LEVEL IS DESIRABLE.   TRIGLYCERIDE  <150 MG/DL 75   Comment: TRIGLYCERIDE REFERENCE RANGE APPLIES TO FASTING SAMPLE         *  *  *  *  BASED ON CURRENT GUIDELINES, THIS LEVEL IS NORMAL.   HDL CHOLESTEROL  >39 MG/DL 38 Low    Comment: BASED ON CURRENT GUIDELINES, THIS LEVEL INDICATES HIGHER RISK.   LDL CHOLESTEROL  <100 MG/DL 61     NON-HDL CHOL (CALC)  <160 MG/DL 73

## 2025-04-23 DIAGNOSIS — Z79.4 TYPE 2 DIABETES MELLITUS WITH HYPERGLYCEMIA, WITH LONG-TERM CURRENT USE OF INSULIN (HCC): ICD-10-CM

## 2025-04-23 DIAGNOSIS — E11.65 TYPE 2 DIABETES MELLITUS WITH HYPERGLYCEMIA, WITH LONG-TERM CURRENT USE OF INSULIN (HCC): ICD-10-CM

## 2025-04-23 RX ORDER — DAPAGLIFLOZIN 10 MG/1
10 TABLET, FILM COATED ORAL DAILY
Qty: 30 TABLET | Refills: 0 | Status: SHIPPED | OUTPATIENT
Start: 2025-04-23

## 2025-04-23 NOTE — TELEPHONE ENCOUNTER
Requested Prescriptions     Pending Prescriptions Disp Refills    FARXIGA 10 MG Oral Tab [Pharmacy Med Name: Farxiga 10 MG Oral Tablet] 30 tablet 0     Sig: Take 1 tablet by mouth once daily     Future Appointments   Date Time Provider Department Center   6/25/2025  3:30 PM Libertad Alfonso APRN [facility] [place]     Your appointments       Date & Time Appointment Department (Center)    Jun 25, 2025 3:30 PM CDT Follow Up Visit with Libertad Alfonso APRN [facility] ([facility])    Contact your primary care provider if your insurance requires a referral.    Please arrive 15 minutes prior to your scheduled appointment. Be sure to bring your current Insurance card, Photo ID, and medication bottles or a list of your current medications.      A 24 hour notice is required to cancel any appointment or you may be charged a $40 No Show Fee.     Important: 24 hour notice is required to cancel any appointment or you may be charged a $40 No Show Fee. Please notify your physician office.               [facility]  [address]  [place] [id number] [phone]          Last A1c value was 7.1% done 3/19/2025.  Last OV:03/19/2025  Last refill:03/19/2025

## 2025-05-17 DIAGNOSIS — Z79.4 TYPE 2 DIABETES MELLITUS WITH HYPERGLYCEMIA, WITH LONG-TERM CURRENT USE OF INSULIN (HCC): ICD-10-CM

## 2025-05-17 DIAGNOSIS — E11.65 TYPE 2 DIABETES MELLITUS WITH HYPERGLYCEMIA, WITH LONG-TERM CURRENT USE OF INSULIN (HCC): ICD-10-CM

## 2025-05-19 RX ORDER — DAPAGLIFLOZIN 10 MG/1
10 TABLET, FILM COATED ORAL DAILY
Qty: 90 TABLET | Refills: 1 | Status: SHIPPED | OUTPATIENT
Start: 2025-05-19

## 2025-05-19 NOTE — TELEPHONE ENCOUNTER
Requested Prescriptions     Pending Prescriptions Disp Refills    FARXIGA 10 MG Oral Tab [Pharmacy Med Name: Farxiga 10 MG Oral Tablet] 30 tablet 0     Sig: Take 1 tablet by mouth once daily     Future Appointments   Date Time Provider Department Center   6/25/2025  3:30 PM Libertad Alfonso APRN [facility] [place]     Last A1c value was 7.1% done 3/19/2025.  Sspeaj66/23/25 Miguel   LOV 03/19/25 Miguel

## 2025-06-25 ENCOUNTER — OFFICE VISIT (OUTPATIENT)
Dept: ENDOCRINOLOGY CLINIC | Facility: CLINIC | Age: 65
End: 2025-06-25
Payer: MEDICAID

## 2025-06-25 VITALS
WEIGHT: 144.63 LBS | BODY MASS INDEX: 24 KG/M2 | SYSTOLIC BLOOD PRESSURE: 120 MMHG | HEART RATE: 71 BPM | DIASTOLIC BLOOD PRESSURE: 70 MMHG | RESPIRATION RATE: 16 BRPM | OXYGEN SATURATION: 98 %

## 2025-06-25 DIAGNOSIS — I15.2 HYPERTENSION ASSOCIATED WITH DIABETES (HCC): ICD-10-CM

## 2025-06-25 DIAGNOSIS — I50.20 HFREF (HEART FAILURE WITH REDUCED EJECTION FRACTION) (HCC): ICD-10-CM

## 2025-06-25 DIAGNOSIS — E11.65 TYPE 2 DIABETES MELLITUS WITH HYPERGLYCEMIA, WITH LONG-TERM CURRENT USE OF INSULIN (HCC): Primary | ICD-10-CM

## 2025-06-25 DIAGNOSIS — E11.69 DYSLIPIDEMIA ASSOCIATED WITH TYPE 2 DIABETES MELLITUS (HCC): ICD-10-CM

## 2025-06-25 DIAGNOSIS — E11.59 HYPERTENSION ASSOCIATED WITH DIABETES (HCC): ICD-10-CM

## 2025-06-25 DIAGNOSIS — N18.4 CKD (CHRONIC KIDNEY DISEASE) STAGE 4, GFR 15-29 ML/MIN (HCC): ICD-10-CM

## 2025-06-25 DIAGNOSIS — Z79.4 TYPE 2 DIABETES MELLITUS WITH HYPERGLYCEMIA, WITH LONG-TERM CURRENT USE OF INSULIN (HCC): Primary | ICD-10-CM

## 2025-06-25 DIAGNOSIS — E78.5 DYSLIPIDEMIA ASSOCIATED WITH TYPE 2 DIABETES MELLITUS (HCC): ICD-10-CM

## 2025-06-25 LAB — HEMOGLOBIN A1C: 7.2 % (ref 4.3–5.6)

## 2025-06-25 PROCEDURE — 99214 OFFICE O/P EST MOD 30 MIN: CPT | Performed by: NURSE PRACTITIONER

## 2025-06-25 PROCEDURE — 95251 CONT GLUC MNTR ANALYSIS I&R: CPT | Performed by: NURSE PRACTITIONER

## 2025-06-25 PROCEDURE — 83036 HEMOGLOBIN GLYCOSYLATED A1C: CPT | Performed by: NURSE PRACTITIONER

## 2025-06-25 RX ORDER — PEN NEEDLE, DIABETIC 30 GX3/16"
1 NEEDLE, DISPOSABLE MISCELLANEOUS 3 TIMES DAILY
Qty: 300 EACH | Refills: 0 | Status: SHIPPED | OUTPATIENT
Start: 2025-06-25

## 2025-06-25 RX ORDER — ACYCLOVIR 400 MG/1
1 TABLET ORAL
Qty: 3 EACH | Refills: 5 | Status: SHIPPED | OUTPATIENT
Start: 2025-06-25

## 2025-06-25 RX ORDER — INSULIN LISPRO 100 [IU]/ML
INJECTION, SOLUTION INTRAVENOUS; SUBCUTANEOUS
Qty: 15 ML | Refills: 1 | Status: SHIPPED | OUTPATIENT
Start: 2025-06-25

## 2025-06-25 NOTE — PROGRESS NOTES
Roldan Mast is a 65 year old presents today for Diabetes management.   Primary care physician: Maylin YBARRA   Last Diabetes appointment with me 3-2025; Started farxiga  (Chronic Kidney Disease, CHF)      Today's A1C 7.2% ( last A1C 7.1%)    Health updates:   CKD stage IV ;  on [facility]  -->  followed by Dr Sarah and [facility] .     R eye diabetic retinopathy : currently having avastin injections w [facility]   Chronic combined systolic and diastolic congestive heart failure (Primary Dx);   Ischemic cardiomyopathy; [facility]     Trying to eat healthy most days. Very low carb and most carbs are from fruit/veggies.       Reviewed CGM report/trends w patient today:    Dexcom (full download in media)   Sensor active time: 92 %    4 week (28-30 days)  GMI 92 %    Average glucose: 196 mg/dl     Hypoglycemia 0%    Time in Range  51 %  (ADA recommended goal > 50%)   Variability WNL ( < 33%)   Highest trends 5pm - 11 pm --> dosing humalog at 6-7 pm (not before dinner0     Due to medical frailty , aggressive control not warranted;  Time in Range target > 50% and hypoglycemia < 1 %          Diabetes History:  DM ~ 1995   transitioned to insulin ~ 1997   Patient has not had hospitalizations for blood sugar issues  denies any history of pancreatitis      Previous DM therapies:  Lack of effect w oral agents   Levemir: formulary   Farxiga 10 mg : 2023 : cost   Basaglar: 2024 hypoglycemia       Current DM Regimen:  Farxiga 10mg once daily   Humalog U 100  kwik pen: 4- 6 units at meals     HGBA1C:    Lab Results   Component Value Date    A1C 7.1 (A) 03/19/2025    A1C 7.4 (H) 10/05/2024    A1C 8.1 (H) 12/19/2023     (H) 10/05/2024       Lab Results   Component Value Date    CHOLEST 111 12/10/2024    CHOLEST 112 10/28/2024    TRIG 86 10/28/2024    TRIG 128 02/13/2023    HDL 38 12/10/2024    HDL 42 10/28/2024    LDL 61 12/10/2024    LDL 53 10/28/2024     Lab Results   Component Value Date    MICROALBCREA  1,529.9 (H) 10/28/2024    MICROALBCREA 1,002.7 (H) 08/31/2016      Lab Results   Component Value Date    CREATSERUM 3.75 (H) 10/28/2024    CREATSERUM 3.73 (H) 10/09/2024    EGFRCR 17 (L) 10/28/2024    EGFRCR 18 (L) 10/09/2024     Lab Results   Component Value Date    AST 20 10/28/2024    AST 14 10/08/2024    ALT 27 10/28/2024    ALT 19 10/08/2024       Lab Results   Component Value Date    TSH 2.228 10/28/2024    TSH 2.17 09/25/2021    T4F 1.0 09/25/2021           DM Complications:  Microvascular:   Neuropathy: yes  Retinopathy: yes  Nephropathy: yes    Macrovascular:  PVD: no  CAD: yes hx Stents x 2 2006,  CABG x 5 2010 , CHF   Stroke/CVA: no    Modifying factors:  Medication adherence: yes   Recent steroids, illness or infections ( past 3m): no     Allergies: Adenosine and Spironolactone    Past Medical History:    Atherosclerosis of coronary artery    CAD (coronary artery disease)    Stents x 2 - once in 2006 after MI and prior to CABG and again in 2010.    CAD (coronary artery disease), autologous vein bypass graft    CKD (chronic kidney disease)    Congestive heart disease (HCC)    Diabetes (HCC)    DM type 2, uncontrolled, with renal complications    Essential hypertension    Hyperlipidemia    Hyperlipidemia LDL goal < 70    Hypertension    Hypertension, renal disease    Hypertensive heart/renal disease with failure    Myocardial infarction (HCC)    S/P CABG x 5    CABG x 5 in 2006    Systolic CHF (HCC)     Past Surgical History:   Procedure Laterality Date    Angioplasty (coronary)  2006    stent x2    Angioplasty (coronary)  2010    stent x1    Cabg  2006    CABG x 5    Other surgical history  2007    Bilateral lens replacement    Other surgical history  2009    Lasik bilaterally    Tonsillectomy  At age 11     Social History     Socioeconomic History    Marital status:     Number of children: 4   Occupational History    Occupation: Auto repair   Tobacco Use    Smoking status: Never    Smokeless  tobacco: Never   Vaping Use    Vaping status: Never Used   Substance and Sexual Activity    Alcohol use: Not Currently     Comment: occ    Drug use: No    Sexual activity: Not Currently   Other Topics Concern    Caffeine Concern Yes     Comment: 2-3 cups of coffee weekly    Exercise No    Seat Belt Yes     Social Drivers of Health     Food Insecurity: Patient Declined (3/19/2025)    Received from [facility]    Hunger Vital Sign     Worried About Running Out of Food in the Last Year: Patient declined     Ran Out of Food in the Last Year: Patient declined   Transportation Needs: Patient Declined (3/19/2025)    Received from [facility]    PRAPARE - Transportation     Lack of Transportation (Medical): Patient declined     Lack of Transportation (Non-Medical): Patient declined   Housing Stability: Unknown (3/19/2025)    Received from [facility]    Housing Stability Vital Sign     Unable to Pay for Housing in the Last Year: Patient declined     Family History   Problem Relation Age of Onset    Diabetes Mother         Type 2 DM on oral agents    Thyroid Disorder Mother         Thyroid history - not able to elaborate    Other (Other) Mother         See thyroid tab    Heart Disorder Maternal Grandmother         CAD    Diabetes Paternal Grandmother         Type 2 DM    Diabetes Brother         Type 2 DM on oral agents    Diabetes Daughter         Type 2 DM on oral agents    Diabetes Daughter         Type 2 DM on oral agents    Thyroid Disorder Daughter         Thyroid history - unable to elaborate    Other (Other) Daughter         See thyroid tab    Diabetes Brother         Type 2 DM on oral agents    Eye Problems Neg      Current Medication List:   Current Outpatient Medications   Medication Sig Dispense Refill    FARXIGA 10 MG Oral Tab Take 1 tablet by mouth once daily 90 tablet 1    Insulin Lispro, 1 Unit Dial, 100 UNIT/ML Subcutaneous Solution Pen-injector  INJECT 10 TO 15 UNITS SUBCUTANEOUSLY IN DIVIDED DOSES TWICE DAILY 3 mL 0    glucagon (GVOKE HYPOPEN 2-PACK) 1 MG/0.2ML Subcutaneous injection 1mg subcutaneous as needed for severe hypoglycemia 0.4 mL 1    Insulin Pen Needle (PEN NEEDLES) 32G X 4 MM Does not apply Misc 1 Device in the morning, at noon, and at bedtime. 300 each 0    hydrALAZINE 50 MG Oral Tab Take 1 tablet (50 mg total) by mouth 3 (three) times daily.      ezetimibe 10 MG Oral Tab Take 1 tablet (10 mg total) by mouth daily. 30 tablet 0    clopidogrel 75 MG Oral Tab Take 1 tablet (75 mg total) by mouth daily. 30 tablet 0    atorvastatin 40 MG Oral Tab Take 1 tablet (40 mg total) by mouth daily. 30 tablet 0    carvedilol 12.5 MG Oral Tab Take 1 tablet (12.5 mg total) by mouth 2 (two) times daily with meals. 60 tablet 11    isosorbide mononitrate ER 30 MG Oral Tablet 24 Hr Take 1 tablet (30 mg total) by mouth daily. 30 tablet 11    torsemide 20 MG Oral Tab Take 1 tablet (20 mg total) by mouth daily. 30 tablet 11    aspirin 325 MG Oral Tab Take 1 tablet (325 mg total) by mouth nightly.      Alcohol Swabs (BD SWAB SINGLE USE REGULAR) Does not apply Pads              DM associated review of  symptoms:   Endocrine: Polyuria, polyphagia, polydipsia: no  Neurological: Paresthesias: yes, LE   HEENT: Blurred vision:  R eye vision decreased : + DR   Skin: no rash or wounds  Hematological: Hypoglycemia: yes,  see dexcom        Review of Systems   + fatigue , low endurance   LUNGS: denies shortness of breath   CARDIOVASCULAR: denies chest pain  GI: denies abdominal pain, nausea, constipation  or diarrhea   : denies dysuria or mycotic rash         Physical exam:  /70   Pulse 71   Resp 16   Wt 144 lb 9.6 oz (65.6 kg)   SpO2 98%   BMI 24.06 kg/m²   Body mass index is 24.06 kg/m².    Physical Exam   Vitals reviewed.  Constitutional: Normal appearance   Cardiovascular: Normal rate   Pulmonary/Chest: Effort normal   Neurological: Alert and oriented .    Psychiatric: Normal mood and affect.       Assessment/Plan:    Hypertension  Well controlled  needs ongoing monitoring   CPM        Nephropathy /CKD stage 4   Reminded patient impact glycemic trends have on kidney health   Lab Results   Component Value Date    EGFRCR 17 (L) 10/28/2024    MICROALBCREA 1,529.9 (H) 10/28/2024      Needs ongoing monitoring --> maintain w Dr Sarah[facility]       CAD/CHF   No active symptoms.   Followed by [facility] cardiology/HF team   Continue  Farxiga 10mg once daily   Reminded to be mindful of symptoms of UTI or yeast infections- see dm note          Dyslipidemia   Cholesterol: 111, done on 12/10/2024.  HDL Cholesterol: 38, done on 12/10/2024.  TriGlycerides 86, done on 10/28/2024.  LDL Cholesterol: 61, done on 12/10/2024.      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     Due to lower body weight, appetite: avoid GLP1 at this time   For CAD, CHF: continue Farxiga 10mg once daily however due to CKD stage 4 and egf < 25, most likely minimal impact on Glycemic trends       Continue Dexcom G7   Humalog U 100 kwik pen  : 5 units at meals  (B/D)   Discussed dosing at time of B/D meals     At dinner: After 3 hours of meal dose, If  Blood sugar is still above 220 , ok to dose Humalog again :     Humalog:   Correction dose:   If Blood sugar is:   220-260, dose 2 units   261-300, dose 3 units   Over 301, dose 4 units     Continue site rotation for subcutaneous injections     Reviewed clinical significance of A1c, adverse effects of suboptimal glucose control, and goals of therapy   Reviewed the A1C test, what the value reflects and the goal for the patient.    Reminded pt on A1C and blood sugar targets (Fasting < 130 and post prandial <180 ) and complications associated with hyperglycemia and uncontrolled DM (on AVS)   Recommended SMBG 3- x daily if not using CGM   Reviewed s/s and treatment of hypoglycemia (on AVS)   For hypoglycemia emergency, Glucagon rx: 3-2025       Reinforced timing and adherence with medication, self-monitoring of blood glucose and routine follow up    The patient is asked to return in  4- 5 m w APN  but recommended to contact DM clinic sooner if questions or concerns.    The patient indicates understanding of these issues and agrees to the plan.      No orders of the defined types were placed in this encounter.    Diabetes complications & risks surveillance:   A1C/Blood pressure: as reported    Last dilated eye exam: Last Dilated Eye Exam: 03/18/25   Exam shows retinopathy? Eye Exam shows Diabetic Retinopathy?: Yes  Last diabetic foot exam: No data recorded  Nephropathy screening:  Not on  ace /arb rx.  (Per nephrology)  Lab Results   Component Value Date    EGFRCR 17 (L) 10/28/2024    MICROALBCREA 1,529.9 (H) 10/28/2024     LIPID screening:    Lab Results   Component Value Date    CHOLEST 111 12/10/2024    LDL 61 12/10/2024    TRIG 86 10/28/2024    HDL 38 12/10/2024    FASTING Yes 10/28/2024    FASTING Yes 10/28/2024     Cholesterol Lowering Medications            ezetimibe 10 MG Oral Tab    atorvastatin 40 MG Oral Tab           The risks and benefits of my recommendations, as well as other treatment options were discussed with the patient today. questions were also answered to the best of my knowledge.

## 2025-06-25 NOTE — PATIENT INSTRUCTIONS
Your blood sugars are doing well       Continue Dexcom G7     Dose humalog at breakfast and dinner       Humalog U 100 kwik pen  :5  units at meals    Your highest trends are after dinner and before bedtime   If you blood sugars is still over 220 after dosing humalog at dinner, ok to dose again in 3 hours           After 3 hours of meal dose, If  Blood sugar is above 220 , ok to dose Humalog again BUT at lower doses   Correction dose:   If Blood sugar is:   220-260, dose 2 units   261-300, dose 3 units   Over 301, dose 4 units         American Diabetes Association: blood sugar targets:     Fasting blood sugar (before breakfast) Target:     2 hours after eating less than 180    Call for blood sugars less than  75 or greater than  200 more than 2 times in a week     If you are wearing the sensor, please look at your trends/averages    Recommendations:   GMI (estimated A1C ) target under  8%     Time in range (healthy blood sugar targets) : goal is over 50%   less than 70 : goal is less than 2%       Watch for low blood sugars: (less than 70 )    Treatment of Low Blood Glucose Action Plan  1. Check blood glucose to be sure that it is low. You cannot  always go by symptoms or how you feel. If in doubt, treat your low blood glucose anyway.  Rule of 15 :     2. Take 15 grams of carbohydrate (carb). Here are some choices:    4 oz. regular fruit juice  3-4 glucose tablets  6 oz. regular soda   7-8 jelly beans    3. Recheck blood glucose after 10-15 minutes. If blood glucose is still low (less than 70 mg/dl) repeat the treatment (step 2).    4. If your next meal is more than one hour away, eat a small snack.    5. If you’re not sure what caused your low blood glucose, call your healthcare provider.    6. Always check your blood glucose before you drive       To treat a low, I recommend you carry with you easy, pre-portioned treatment for low blood sugars that are 15G of carbs:   - Children sized squeeze pouch  applesauce (high fiber + carbs help prevent too high of a spike)  - Small children's sized juicebox- 15g carb --> 4oz juice box  - Glucose tablets from [employer], you can find them near diabetes supplies --> Note, you will need to eat 3-4 tablets to get to 15g of carbs  - Children sized fruit snack pack- look for one with 15 grams of total carbohydrate    AVOID complex carbs, or foods that contain fats along with carbs (like chocolate) can slow the absorption of glucose and should NOT be used to treat an emergency low

## (undated) DIAGNOSIS — E11.65 TYPE 2 DIABETES MELLITUS WITH HYPERGLYCEMIA, WITH LONG-TERM CURRENT USE OF INSULIN (HCC): ICD-10-CM

## (undated) DIAGNOSIS — Z79.4 TYPE 2 DIABETES MELLITUS WITH HYPERGLYCEMIA, WITH LONG-TERM CURRENT USE OF INSULIN (HCC): ICD-10-CM

## (undated) NOTE — LETTER
Date & Time: 11/14/2023, 9:42 AM  Patient: Nader Johnson  Encounter Provider(s):    RUSS Vergara       To Whom It May Concern:    Nader Johnson was seen and treated in our department on 11/14/2023. He should not return to work until November 17, 2023 .     If you have any questions or concerns, please do not hesitate to call.        _____________________________  Physician/APC Signature

## (undated) NOTE — LETTER
2/27/2024    Roldan Mast  [id number] [facility] [id number]    Dear Roldan,    We would like to inform you that your account has been charged $40 for not showing up to the office for your scheduled appointment on 02/27/24.    Our no-show policy is as follows: A 24-hour notice is required, or you may be charged a $40 No Show fee.      If you are unable to keep your scheduled appointment, please notify us at least 24 hours in advance so we can accommodate our other patients. You may also reschedule your appointment at that time.    On the third no-show, within a 12-month period, it will be the physician’s discretion as to whether a discharge letter will be sent out disengaging you from the practice and giving you 30 days to enroll with a new non [facility] physician.    If you need any assistance in attending your appointments, please call Patient Experience at [phone] so that we may help you identify possible solutions.    Sincerely,  [facility]